# Patient Record
Sex: FEMALE | Race: WHITE | NOT HISPANIC OR LATINO | Employment: FULL TIME | URBAN - METROPOLITAN AREA
[De-identification: names, ages, dates, MRNs, and addresses within clinical notes are randomized per-mention and may not be internally consistent; named-entity substitution may affect disease eponyms.]

---

## 2017-05-18 ENCOUNTER — OFFICE VISIT (OUTPATIENT)
Dept: OBSTETRICS AND GYNECOLOGY | Facility: CLINIC | Age: 32
End: 2017-05-18
Payer: COMMERCIAL

## 2017-05-18 VITALS
HEIGHT: 66 IN | DIASTOLIC BLOOD PRESSURE: 64 MMHG | SYSTOLIC BLOOD PRESSURE: 116 MMHG | BODY MASS INDEX: 21.25 KG/M2 | WEIGHT: 132.25 LBS

## 2017-05-18 DIAGNOSIS — Z01.419 ENCOUNTER FOR GYNECOLOGICAL EXAMINATION WITHOUT ABNORMAL FINDING: Primary | ICD-10-CM

## 2017-05-18 DIAGNOSIS — Z97.5 IUD (INTRAUTERINE DEVICE) IN PLACE: ICD-10-CM

## 2017-05-18 PROCEDURE — 87624 HPV HI-RISK TYP POOLED RSLT: CPT

## 2017-05-18 PROCEDURE — 88175 CYTOPATH C/V AUTO FLUID REDO: CPT

## 2017-05-18 PROCEDURE — 99395 PREV VISIT EST AGE 18-39: CPT | Mod: S$GLB,,, | Performed by: ADVANCED PRACTICE MIDWIFE

## 2017-05-18 PROCEDURE — 99999 PR PBB SHADOW E&M-EST. PATIENT-LVL II: CPT | Mod: PBBFAC,,, | Performed by: ADVANCED PRACTICE MIDWIFE

## 2017-05-18 NOTE — MR AVS SNAPSHOT
"    [facility] [address]  [address]  [address]  [facility] [address]  Phone: [phone]  Fax: [phone]                  Ray Ugarte   2017 3:00 PM   Office Visit    Description:  Female : 1985   Provider:  Lisa Pittman CNM   Department:  [facility] [address]           Reason for Visit     Well Woman                To Do List           Goals (5 Years of Data)     None      [facility]     [facility] On Call Nurse Care Line -  Assistance  Unless otherwise directed by your provider, please contact [facility], our nurse care line that is available for  assistance.     Registered nurses in the [facility] provide: appointment scheduling, clinical advisement, health education, and other advisory services.  Call: [phone] (toll free)               Medications           Message regarding Medications     Verify the changes and/or additions to your medication regime listed below are the same as discussed with your clinician today.  If any of these changes or additions are incorrect, please notify your healthcare provider.        STOP taking these medications     amoxicillin-clavulanate 875-125mg (AUGMENTIN) 875-125 mg per tablet Take 1 tablet by mouth every 12 (twelve) hours.    guaifenesin-codeine 100-10 mg/5 ml (TUSSI-ORGANIDIN NR)  mg/5 mL syrup Take 5-10 mLs by mouth every evening.    naproxen (NAPROSYN) 500 MG tablet Take 1 tablet (500 mg total) by mouth 2 (two) times daily.           Verify that the below list of medications is an accurate representation of the medications you are currently taking.  If none reported, the list may be blank. If incorrect, please contact your healthcare provider. Carry this list with you in case of emergency.           Current Medications            Clinical Reference Information           Your Vitals Were     BP Height Weight BMI       116/64 5' 6" (1.676 m) 60 kg (132 lb 4.4 oz) 21.35 kg/m2     "   Blood Pressure          Most Recent Value    BP  116/64      Allergies as of 5/18/2017     Shellfish Containing Products      Immunizations Administered on Date of Encounter - 5/18/2017     None      Language Assistance Services     ATTENTION: Language assistance services are available, free of charge. Please call [phone].      ATENCIÓN: Si habla rob, tiene a fuentes disposición servicios gratuitos de asistencia lingüística. Llame al [phone].     CHÚ Ý: N?u b?n nói Ti?ng Vi?t, có các d?ch v? h? tr? ngôn ng? mi?n phí dành cho b?n. G?i s? [phone].         [facility] [address] complies with applicable Federal civil rights laws and does not discriminate on the basis of race, color, national origin, age, disability, or sex.

## 2017-05-18 NOTE — PROGRESS NOTES
"CC: Well woman exam    Ray Ugarte is a 31 y.o. female  presents for well woman exam.  No LMP recorded. Patient has had an implant.  No issues, problems, or complaints.      Pt's IUD threads not visualized 2015 - pelvic ultrasound here confirmed IUD in correct location.  She has not had any other problems with it since that time and happy with her contraceptive method.    Last pap: Unsure? - Normal    Past Medical History:   Diagnosis Date    Abnormal Pap smear of cervix ?    No procedures necessary. Repeat pap normal     Past Surgical History:   Procedure Laterality Date     SECTION  2015    LASIK       Social History     Social History    Marital status:      Spouse name: N/A    Number of children: N/A    Years of education: N/A     Occupational History    Not on file.     Social History Main Topics    Smoking status: Never Smoker    Smokeless tobacco: Never Used    Alcohol use No    Drug use: No    Sexual activity: Yes     Partners: Male     Birth control/ protection: IUD     Other Topics Concern    Not on file     Social History Narrative    Works as     Lives with  and daughter     Family History   Problem Relation Age of Onset    Breast cancer Neg Hx     Colon cancer Neg Hx     Ovarian cancer Neg Hx      OB History      Para Term  AB TAB SAB Ectopic Multiple Living    1 1 1      0 1          /64  Ht 5' 6" (1.676 m)  Wt 60 kg (132 lb 4.4 oz)  BMI 21.35 kg/m2      ROS:  GENERAL: Denies weight gain or weight loss. Feeling well overall.   SKIN: Denies rash or lesions.   HEAD: Denies head injury or headache.   NODES: Denies enlarged lymph nodes.   CHEST: Denies chest pain or shortness of breath.   CARDIOVASCULAR: Denies palpitations or left sided chest pain.   ABDOMEN: No abdominal pain, constipation, diarrhea, nausea, vomiting or rectal bleeding.   URINARY: No frequency, dysuria, hematuria, or burning on " urination.  REPRODUCTIVE: See HPI.   BREASTS: The patient performs breast self-examination and denies pain, lumps, or nipple discharge.   HEMATOLOGIC: No easy bruisability or excessive bleeding.   MUSCULOSKELETAL: Denies joint pain or swelling.   NEUROLOGIC: Denies syncope or weakness.   PSYCHIATRIC: Denies depression, anxiety or mood swings.    PHYSICAL EXAM:  APPEARANCE: Well nourished, well developed, in no acute distress.  AFFECT: Alert and oriented x 3  SKIN: Warm, dry, & intact. No acne or hirsutism.  NECK: Neck symmetric with normal range of motion  NODES: No cervical, axillary, epitrochlear, inguinal, or femoral lymph node enlargement  CHEST: Good respiratory effect.  ABDOMEN: Soft.  No tenderness or masses.  No hepatosplenomegaly.  No hernias.  BREASTS: Symmetrical, no skin changes or visible lesions.  No palpable masses, nipple discharge bilaterally.  PELVIC: Normal external genitalia without lesions.  Normal hair distribution.  Adequate perineal body, normal urethral meatus.  Vagina moist and well rugated without lesions or discharge.  Cervix pink, without lesions, discharge or tenderness - IUD threads not visible.  No significant cystocele or rectocele.  Bimanual exam shows uterus to be normal size, regular, mobile and nontender.  Adnexa without masses or tenderness.    EXTREMITIES: No edema.    ASSESSMENT/PLAN:  Encounter for gynecological examination without abnormal finding  -     Liquid-based pap smear, screening  -     HPV High Risk Genotypes, PCR    Mirena IUD in place (since 6/2015)    Patient was counseled today on A.C.S. Pap guidelines and recommendations for yearly pelvic exams, mammograms and monthly self breast exams; to see her PCP for other health maintenance.     Return in about 1 year (around 5/18/2018).

## 2017-05-24 LAB
HPV16 DNA SPEC QL NAA+PROBE: NEGATIVE
HPV16+18+H RISK 12 DNA CVX-IMP: NEGATIVE
HPV18 DNA SPEC QL NAA+PROBE: NEGATIVE

## 2017-06-26 ENCOUNTER — OFFICE VISIT (OUTPATIENT)
Dept: INTERNAL MEDICINE | Facility: CLINIC | Age: 32
End: 2017-06-26
Payer: COMMERCIAL

## 2017-06-26 VITALS
BODY MASS INDEX: 22.22 KG/M2 | HEIGHT: 66 IN | SYSTOLIC BLOOD PRESSURE: 122 MMHG | DIASTOLIC BLOOD PRESSURE: 74 MMHG | OXYGEN SATURATION: 99 % | HEART RATE: 95 BPM | WEIGHT: 138.25 LBS | TEMPERATURE: 99 F

## 2017-06-26 DIAGNOSIS — J32.9 SINUSITIS, UNSPECIFIED CHRONICITY, UNSPECIFIED LOCATION: Primary | ICD-10-CM

## 2017-06-26 PROCEDURE — 99213 OFFICE O/P EST LOW 20 MIN: CPT | Mod: S$GLB,,, | Performed by: INTERNAL MEDICINE

## 2017-06-26 PROCEDURE — 99999 PR PBB SHADOW E&M-EST. PATIENT-LVL III: CPT | Mod: PBBFAC,,, | Performed by: INTERNAL MEDICINE

## 2017-06-26 RX ORDER — AMOXICILLIN 875 MG/1
875 TABLET, FILM COATED ORAL EVERY 12 HOURS
Qty: 20 TABLET | Refills: 0 | Status: SHIPPED | OUTPATIENT
Start: 2017-06-26 | End: 2017-11-21

## 2017-06-26 NOTE — PROGRESS NOTES
Subjective:       Patient ID: Ray Ugarte is a 31 y.o. female.    Chief Complaint: URI (productive cough with yellow phlegm, nasal and chest congestion, sore throat x 3 wks )    URI    This is a new problem. The current episode started 1 to 4 weeks ago. The problem has been gradually worsening. There has been no fever. Associated symptoms include congestion, coughing, ear pain, headaches, rhinorrhea, sinus pain and a sore throat. Pertinent negatives include no abdominal pain, chest pain, diarrhea, dysuria, nausea, rash, vomiting or wheezing. She has tried antihistamine, increased fluids and NSAIDs for the symptoms. The treatment provided no relief.     Review of Systems   Constitutional: Negative for activity change, chills, fatigue and fever.   HENT: Positive for congestion, ear pain, rhinorrhea and sore throat. Negative for nosebleeds, postnasal drip and sinus pressure.    Eyes: Negative.  Negative for visual disturbance.   Respiratory: Positive for cough. Negative for chest tightness, shortness of breath and wheezing.    Cardiovascular: Negative for chest pain.   Gastrointestinal: Negative for abdominal pain, diarrhea, nausea and vomiting.   Genitourinary: Negative for difficulty urinating, dysuria, frequency and urgency.   Musculoskeletal: Negative for arthralgias and neck stiffness.   Skin: Negative for rash.   Neurological: Positive for headaches. Negative for dizziness and weakness.   Psychiatric/Behavioral: Negative for sleep disturbance. The patient is not nervous/anxious.        Objective:      Physical Exam   Constitutional: She is oriented to person, place, and time. She appears well-developed and well-nourished.  Non-toxic appearance. No distress.   HENT:   Head: Normocephalic and atraumatic.   Right Ear: Tympanic membrane, external ear and ear canal normal.   Left Ear: Tympanic membrane, external ear and ear canal normal.   Nose: Right sinus exhibits maxillary sinus tenderness and frontal sinus  tenderness. Left sinus exhibits maxillary sinus tenderness and frontal sinus tenderness.   Mouth/Throat:       Eyes: EOM are normal. Pupils are equal, round, and reactive to light. No scleral icterus.   Neck: Normal range of motion. Neck supple. No thyromegaly present.   Cardiovascular: Normal rate, regular rhythm and normal heart sounds.    Pulmonary/Chest: Effort normal and breath sounds normal.   Abdominal: Soft. Bowel sounds are normal. She exhibits no mass. There is no tenderness. There is no rebound.   Musculoskeletal: Normal range of motion.   Lymphadenopathy:     She has no cervical adenopathy.   Neurological: She is alert and oriented to person, place, and time. She has normal reflexes. She displays normal reflexes. No cranial nerve deficit. She exhibits normal muscle tone. Coordination normal.   Skin: Skin is warm and dry.   Psychiatric: She has a normal mood and affect. Her behavior is normal.       Assessment:       1. Sinusitis, unspecified chronicity, unspecified location        Plan:   Ray was seen today for uri.    Diagnoses and all orders for this visit:    Sinusitis, unspecified chronicity, unspecified location    Other orders  -     amoxicillin (AMOXIL) 875 MG tablet; Take 1 tablet (875 mg total) by mouth every 12 (twelve) hours.

## 2017-07-11 ENCOUNTER — OFFICE VISIT (OUTPATIENT)
Dept: INTERNAL MEDICINE | Facility: CLINIC | Age: 32
End: 2017-07-11
Payer: COMMERCIAL

## 2017-07-11 VITALS
WEIGHT: 138 LBS | BODY MASS INDEX: 22.18 KG/M2 | HEART RATE: 75 BPM | OXYGEN SATURATION: 98 % | DIASTOLIC BLOOD PRESSURE: 70 MMHG | HEIGHT: 66 IN | SYSTOLIC BLOOD PRESSURE: 104 MMHG

## 2017-07-11 DIAGNOSIS — J32.9 VIRAL SINUSITIS: Primary | ICD-10-CM

## 2017-07-11 DIAGNOSIS — H57.89 IRRITATION OF LEFT EYE: ICD-10-CM

## 2017-07-11 DIAGNOSIS — B97.89 VIRAL SINUSITIS: Primary | ICD-10-CM

## 2017-07-11 PROCEDURE — 99999 PR PBB SHADOW E&M-EST. PATIENT-LVL III: CPT | Mod: PBBFAC,,, | Performed by: INTERNAL MEDICINE

## 2017-07-11 PROCEDURE — 99213 OFFICE O/P EST LOW 20 MIN: CPT | Mod: 25,S$GLB,, | Performed by: INTERNAL MEDICINE

## 2017-07-11 PROCEDURE — 96372 THER/PROPH/DIAG INJ SC/IM: CPT | Mod: S$GLB,,, | Performed by: INTERNAL MEDICINE

## 2017-07-11 RX ORDER — TRIAMCINOLONE ACETONIDE 40 MG/ML
40 INJECTION, SUSPENSION INTRA-ARTICULAR; INTRAMUSCULAR
Status: COMPLETED | OUTPATIENT
Start: 2017-07-11 | End: 2017-07-11

## 2017-07-11 RX ADMIN — TRIAMCINOLONE ACETONIDE 40 MG: 40 INJECTION, SUSPENSION INTRA-ARTICULAR; INTRAMUSCULAR at 11:07

## 2017-07-11 NOTE — PROGRESS NOTES
Subjective:       Patient ID: Ray Ugarte is a 31 y.o. female.    Chief Complaint: Sore Throat (dry cough and sore throat X2days, eye infection)    HPI     Patient is a 31 year old female here today for sore throat and eye irritation. Sx began 3 days ago. No fever/chills. No ear pain. Has mild congestion. Cough which is productive green  Sputum is intermittent. PND. No upset stomach or diarrhea. She was treated about 2 weeks ago for sinus infection with amoxil, says sx resolved mostly except the cough but yesterday she felt worse. Today she says she overall does feel better.    Review of Systems   HENT: Positive for congestion, postnasal drip and sore throat. Negative for ear pain and rhinorrhea.    Eyes: Positive for discharge and visual disturbance.   Respiratory: Positive for cough. Negative for shortness of breath and wheezing.    Gastrointestinal: Negative for abdominal pain and diarrhea.       Objective:      Physical Exam   Constitutional: She is oriented to person, place, and time. She appears well-developed and well-nourished. No distress.   HENT:   Head: Normocephalic and atraumatic.   Right Ear: External ear normal.   Left Ear: External ear normal.   No effusion posterior to BL TM  Posterior oropharyngeal erythema + cobblestoning  No tonsillar exudate   Eyes: Conjunctivae and EOM are normal. Pupils are equal, round, and reactive to light.   Neck: Normal range of motion. Neck supple. No thyromegaly present.   Cardiovascular: Normal rate, regular rhythm, normal heart sounds and intact distal pulses.    No murmur heard.  Pulmonary/Chest: Effort normal and breath sounds normal. No respiratory distress. She has no wheezes. She has no rales.   Musculoskeletal: She exhibits no edema.   Lymphadenopathy:     She has no cervical adenopathy.   Neurological: She is alert and oriented to person, place, and time.   Skin: Skin is warm and dry. She is not diaphoretic.   Nursing note and vitals reviewed.       Assessment:       1. Viral sinusitis    2. Irritation of left eye        Plan:       Suspect a viral sinusitis  Sx < 7 days  Recommend symptom control with OTC antihistamine daily, OTC flonase daily for atleast the next 1-2 weeks, ibuprofen 400 mg every 8 hours prn sore throat  Kenalog 40 mg IM x 1 for pharyngeal inflammation  Optometry referral for L eye irritation   If sx last > 1 week, please let your PCP know

## 2017-07-11 NOTE — PROGRESS NOTES
2 patient identifiers used (name and ). Allergies reviewed.  Procedure tolerated well.  Instructed to remain for 15-20 minutes and report any adverse reaction

## 2017-07-17 ENCOUNTER — INITIAL CONSULT (OUTPATIENT)
Dept: OPTOMETRY | Facility: CLINIC | Age: 32
End: 2017-07-17
Payer: COMMERCIAL

## 2017-07-17 DIAGNOSIS — H52.13 BILATERAL MYOPIA: ICD-10-CM

## 2017-07-17 DIAGNOSIS — H10.13 ALLERGIC CONJUNCTIVITIS, BILATERAL: Primary | ICD-10-CM

## 2017-07-17 DIAGNOSIS — H04.123 BILATERAL DRY EYES: ICD-10-CM

## 2017-07-17 PROCEDURE — 99999 PR PBB SHADOW E&M-EST. PATIENT-LVL II: CPT | Mod: PBBFAC,,, | Performed by: OPTOMETRIST

## 2017-07-17 PROCEDURE — 92004 COMPRE OPH EXAM NEW PT 1/>: CPT | Mod: S$GLB,,, | Performed by: OPTOMETRIST

## 2017-07-17 PROCEDURE — 92015 DETERMINE REFRACTIVE STATE: CPT | Mod: S$GLB,,, | Performed by: OPTOMETRIST

## 2017-07-17 NOTE — PROGRESS NOTES
HPI     Eye Problem    Additional comments: Possible eye infection OS           Comments   Ms. Ray Ugarte was referred by Jarad Kaur MD for eye   irritation (possible eye infection OS).  Pt also would like refraction today, but declines DFE.     Patient complains of itchy, red, painful eye OS. Eye pain last occurred 1   week ago (eye infection led to sinus infection), but pain has resolved.   She reports watery discharge during the day and crusting upon waking.   Pt says she had an eye infection OS 12-13 years ago, and since then she   has had chronic problems OS. OD asymptomatic. Multiple eye infections in   the past few months so she was referred to Optometry. Pt was successfully   treated with Tobradex drops in the past (last prescribed 07/16/16).    (+)drops: Refresh prn  (-)flashes  (-)floaters  (+)diplopia  (-)eye pain currently, but sometimes feels pain in the past.  (+)discharge: watery during the day, crusting upon waking.   (-)light sensitivity.     Diabetic no  No results found for: HGBA1C    OCULAR HISTORY  Last Eye Exam 2 years  LASIK OU at 17 years old  (-)diagnosed or treated for any eye conditions or diseases     FAMILY HISTORY  (-)Glaucoma            Last edited by Colleen Avila, OD on 7/17/2017  5:04 PM. (History)            Assessment /Plan     For exam results, see Encounter Report.    Allergic conjunctivitis, bilateral  Bilateral dry eyes   Cause if itchiness and redness. Pt reassured, no signs of infection today OU.    Recommended OTC Zaditor/Alaway BID OU and cool compresses PRN. Avoid rubbing eyes. Continue Refresh BID-TID OU.    RTC if symptoms worsen (may need to consider topical steroids).     Bilateral myopia   Small increase in refractive error OU. New glasses prescription released, adaptation expected.    Eyeglass Final Rx     Eyeglass Final Rx       Sphere Cylinder Dist [facility]    Right -1.00 Sphere 20/20    Left -1.00 Sphere 20/20    Expiration Date:  7/18/2018                  Patient declines dilation today despite explanation of risks and benefits, including the potential of undetected ocular pathology. Offered return appointment at a later date to complete the dilated portion of the exam, but pt declined.  Reviewed signs and symptoms of a retinal detachment, pt understands to return to clinic immediately with any new floaters, flashes of light, or a veil over vision.        RTC 1 year for comprehensive eye exam with DFE, or sooner prn

## 2017-07-17 NOTE — LETTER
July 17, 2017      Jarad Kaur MD  [address]  [place] [address]           [facility]  [address]  [facility] [address]  Phone: [phone]          Patient: Ray Ugarte   MR Number: [id number]   YOB: 1985   Date of Visit: 7/17/2017       Dear Dr. Jarad Kaur:    Thank you for referring Ray Ugarte to me for evaluation. Attached you will find relevant portions of my assessment and plan of care.    If you have questions, please do not hesitate to call me. I look forward to following Ray Ugarte along with you.    Sincerely,    Colleen Avila, OD    Enclosure  CC:  No Recipients    If you would like to receive this communication electronically, please contact [email or web address] or [phone] to request more information on [employer] access.    For providers and/or their staff who would like to refer a patient to [facility], please contact us through our one-stop-shop provider referral line, [facility], at [phone].    If you feel you have received this communication in error or would no longer like to receive these types of communications, please e-mail [email or web address]

## 2017-07-17 NOTE — PATIENT INSTRUCTIONS
"You have ocular allergies that are causing your eyes to feel itchy. To help with your symptoms please use over-the-counter Zaditor or Alaway; 1 drop in each eye 2 times per day. You can also use cool compresses as needed.     ==============================================    DRY EYES     Dry eyes is a common condition. It can be caused by an insufficient volume of tears, or by tears that do not spread evenly over the cornea. An uneven tear film can also cause vision to blur intermittently.   Dry eyes are often associated with burning, stinging, and/or red eyes.As we age, the eyes usually produce fewer tears and result in decreased normal eye lubrication. Paradoxically, dry eye can make eyes water. When they water, that watery production actually makes the dry eye situation worse because the watery tears do not lubricate the eye well at all. Watery tears really serve to flush foreign material out of the eye, not to lubricate. Unfortunately, when the eyes get really dry they become irritated and stimulate the formation of watery tears.   Lubricants, in the form of drops or ointments, are the principal treatment for dry eyes. The following are recommendations for managing your dry eye condition:    1) Use over-the-counter artificial tears or lubricants (such as Systane Balance, Soothe XP, Refresh Optive, Blink, or Genteal), 1 drop in each eye 3 to 4 times a day. Please avoid drops that advertise redness relief since these can be unhealthy for your eyes and can cause permanent redness if used long-term.     It is best to take artificial tears on a schedule, like you would for a medication. Taking them routinely at breakfast, lunch, and dinner for example will provide better relief and better use of the tear drop than taking them whenever your eyes "feel" dry.    2) Optional use of over-the-counter lubricating gels (such as Genteal Gel, Systane Gel, or Refresh PM) applied to the inside of the lower lid of both eyes at " bedtime. This gel is very thick and may cause blurred vision, so we recommend you use it before bedtime. In the morning you can gently wipe your eyes with a damp washcloth to remove any residual gel.    3) Warm compresses applied to the closed eyelids twice per day, followed with lid massage.    4) Always drink an adequate amount of water daily (4-6 cups a day).    5) 1000 mg of good quality fish oil (labeled DHA and/or EPA). Flaxseed oil can also be beneficial. Do not take fish oil if you are currently taking a medication called warfarin or coumadin.    6) Use a humidifier in your bedroom.    7) If the dryness continues there may be additional options of punctal plugs, or prescription dry eye drops such as Restasis or Xiidra. Punctal plugs are medical devices inserted into the puncta or the drain of the eye to block some of your natural tears from draining off the eye. Restasis and Xiidra are prescription eye drops that, over time, may help your body make more tears naturally.    It is important to maintain this treatment plan until your symptoms have improved. Once improved, you can reduce the frequency of lubricants and warm compresses. If the symptoms recur, then apply as needed for comfort.    ==============================================    FLASHES AND FLOATERS    You've noticed something new in your field of vision, possibly one or more movable spots or squiggles that you can't remember seeing before. Here are some points to follow and some information about this condition:    Located in the hollow interior of the eye is a transparent gel called the VITREOUS. It is composed of microscopic fibers, large molecules, and fluid-like water.  Most people also have small pieces of material suspended within the vitreous, which under ideal lighting conditions will cast a shadow on the retina and become visible as moving spots, which are called vitreous floaters. Floaters become more common with age.    During life the  vitreous fibers condense together and the gel becomes more fluid. Fibrous clumps can form which may become large enough to be seen as floaters. As the fibers condense the liquid portion escapes, allowing the vitreous to shrink, separate from the retina, and collapse forward in the eye. While shrinking, the vitreous gel can pull against the retina causing episodes of light flashes or arcs of light.  The retina does not have pain sensors, and its only response is flashes of light. People often describe these flashes as lightning streaks or fireworks.     A concern is that traction on the retina from the shrinking vitreous may pull a tear in the retina, which could evolve into a retinal detachment.  Therefore persistent flashes do require urgent evaluation. Other symptoms that require urgent evaluation are a large shadow, curtain, or blank spot in your vision, or a sudden shower of dozens or hundreds of tiny floaters resembling pepper or soot.    To summarize, nearly everyone has floaters, but a sudden shower of dots, persistent light flashes in one eye, or a curtain or shadow in your vision require urgent consultation.  If these occur, please let us know immediately.       Colleen Avila, OD

## 2017-07-24 ENCOUNTER — TELEPHONE (OUTPATIENT)
Dept: OBSTETRICS AND GYNECOLOGY | Facility: CLINIC | Age: 32
End: 2017-07-24

## 2017-07-24 NOTE — TELEPHONE ENCOUNTER
----- Message from Cesar Singh sent at 7/24/2017  4:07 PM CDT -----  Contact: Pt  X_ 1st Request  _ 2nd Request  _ 3rd Request    Who: KATE VALLADARES [[id number]]    Why: Patient would like to speak with staff in regards to having annual blood work orders put in    What Number to Call Back: [phone]     When to Expect a call back: (Before the end of the day)  -- if call after 3:00 call back will be tomorrow.

## 2017-07-24 NOTE — TELEPHONE ENCOUNTER
Contacted pt and informed her that Dr. Khan is out of the office until tomorrow and that she has to place the orders. Informed pt that Dr. Khan was sent a message to place the orders. Pt questioned whether the alert will come to her [facility] account. Informed the pt that Dr. Khan will send a message to her staff to call and schedule the labs. Pt verbalized understanding.

## 2017-07-24 NOTE — TELEPHONE ENCOUNTER
Attempted to contact patient to confirm her want for thyroid and lipid testing. No answer. Left message for pt to call clinic back.

## 2017-07-24 NOTE — TELEPHONE ENCOUNTER
----- Message from Harley Mireles sent at 7/24/2017 10:18 AM CDT -----  X_  1st Request  _  2nd Request  _  3rd Request        Who: KATE VALLADARES [[id number]]    Why: Pt stating she needs orders for thyroid and lipid tests. Please call to schedule.    What Number to Call Back:[phone]    When to Expect a call back: (With in 24 hours)

## 2017-10-05 ENCOUNTER — OFFICE VISIT (OUTPATIENT)
Dept: URGENT CARE | Facility: CLINIC | Age: 32
End: 2017-10-05
Payer: COMMERCIAL

## 2017-10-05 VITALS
SYSTOLIC BLOOD PRESSURE: 107 MMHG | HEART RATE: 79 BPM | WEIGHT: 136 LBS | OXYGEN SATURATION: 99 % | TEMPERATURE: 98 F | HEIGHT: 66 IN | RESPIRATION RATE: 12 BRPM | BODY MASS INDEX: 21.86 KG/M2 | DIASTOLIC BLOOD PRESSURE: 73 MMHG

## 2017-10-05 DIAGNOSIS — J06.9 UPPER RESPIRATORY TRACT INFECTION, UNSPECIFIED TYPE: ICD-10-CM

## 2017-10-05 DIAGNOSIS — M26.69 TMJ INFLAMMATION: Primary | ICD-10-CM

## 2017-10-05 PROCEDURE — 99213 OFFICE O/P EST LOW 20 MIN: CPT | Mod: S$GLB,,, | Performed by: NURSE PRACTITIONER

## 2017-10-05 RX ORDER — METHYLPREDNISOLONE 4 MG/1
TABLET ORAL
Qty: 21 TABLET | Refills: 0 | Status: SHIPPED | OUTPATIENT
Start: 2017-10-05 | End: 2017-11-30

## 2017-10-05 NOTE — PROGRESS NOTES
"Subjective:       Patient ID: Ray Ugarte is a 32 y.o. female.    Vitals:  height is 5' 6" (1.676 m) and weight is 61.7 kg (136 lb). Her temperature is 97.8 °F (36.6 °C). Her blood pressure is 107/73 and her pulse is 79. Her respiration is 12 and oxygen saturation is 99%.     Chief Complaint: Sinus Problem    Pt has been experiencing some sinus problems but now she is more concerned with the Sore Throat and Jaw pain.       Sinus Problem   This is a new problem. The current episode started in the past 7 days. The problem has been gradually improving since onset. There has been no fever. Her pain is at a severity of 5/10 (Throat and Jaw pain). The pain is moderate. Associated symptoms include congestion, coughing, ear pain and a sore throat. Pertinent negatives include no chills, headaches, hoarse voice or shortness of breath. (Jaw Pain) Past treatments include oral decongestants. The treatment provided no relief.     Review of Systems   Constitution: Positive for malaise/fatigue. Negative for chills and fever.   HENT: Positive for congestion, ear pain and sore throat. Negative for hoarse voice.    Eyes: Negative for discharge and redness.   Cardiovascular: Negative for chest pain, dyspnea on exertion and leg swelling.   Respiratory: Positive for cough. Negative for shortness of breath, sputum production and wheezing.    Musculoskeletal: Negative for myalgias.   Gastrointestinal: Negative for abdominal pain and nausea.   Neurological: Negative for headaches.   All other systems reviewed and are negative.      Objective:      Physical Exam   Constitutional: She is oriented to person, place, and time. She appears well-developed and well-nourished. She is cooperative.  Non-toxic appearance. She does not appear ill. No distress.   HENT:   Head: Normocephalic and atraumatic.   Right Ear: Hearing, tympanic membrane, external ear and ear canal normal.   Left Ear: Hearing, tympanic membrane, external ear and ear canal " normal.   Nose: Nose normal. No mucosal edema, rhinorrhea or nasal deformity. No epistaxis. Right sinus exhibits no maxillary sinus tenderness and no frontal sinus tenderness. Left sinus exhibits no maxillary sinus tenderness and no frontal sinus tenderness.   Mouth/Throat: Uvula is midline, oropharynx is clear and moist and mucous membranes are normal. No trismus in the jaw. Normal dentition. No uvula swelling. No posterior oropharyngeal erythema (COBBLESTONING).   Eyes: Conjunctivae and lids are normal. No scleral icterus.   Sclera clear bilat   Neck: Trachea normal, full passive range of motion without pain and phonation normal. Neck supple.   Cardiovascular: Normal rate, regular rhythm, normal heart sounds, intact distal pulses and normal pulses.    Pulmonary/Chest: Effort normal and breath sounds normal. No respiratory distress.   Abdominal: Soft. Normal appearance and bowel sounds are normal. She exhibits no distension. There is no tenderness.   Musculoskeletal: Normal range of motion. She exhibits tenderness (C/O PAIN AND TTP ABOUT TMJ. PAIN WHEN OPENING MOUTH.  PATIENT STATES THAT SHE DOES GRIND HER TEETH). She exhibits no edema or deformity.   Lymphadenopathy:        Head (right side): Tonsillar adenopathy present.        Head (left side): Tonsillar adenopathy present.   Neurological: She is alert and oriented to person, place, and time. She exhibits normal muscle tone. Coordination normal.   Skin: Skin is warm, dry and intact. She is not diaphoretic. No pallor.   Psychiatric: She has a normal mood and affect. Her speech is normal and behavior is normal. Judgment and thought content normal. Cognition and memory are normal.   Nursing note and vitals reviewed.      Assessment:       1. TMJ inflammation    2. Upper respiratory tract infection, unspecified type        Plan:         TMJ inflammation  -     methylPREDNISolone (MEDROL DOSEPACK) 4 mg tablet; TAKE AS DIRECTED ON PACKAGE  Dispense: 21 tablet; Refill:  0    Upper respiratory tract infection, unspecified type    Please drink plenty of fluids.  Please get plenty of rest.  Please return here or go to the Emergency Department for any concerns or worsening of condition.  If you were prescribed antibiotics, please take them to completion.  If you were given wait & see antibiotics, please wait 5-7 days before taking them, and only take them if your symptoms have worsened or not improved.  If you do begin taking the antibiotics, please take them to completion.  If you were prescribed a narcotic medication, do not drive or operate heavy equipment or machinery while taking these medications.  If you were given a steroid shot in the clinic and have also been given a prescription for a steroid such as Prednisone or a Medrol Dose Pack, please begin taking them tomorrow.  If you do not have Hypertension or any history of palpitations, it is ok to take over the counter Sudafed or Mucinex D or Allegra-D or Claritin-D or Zyrtec-D.  If you do take one of the above, it is ok to combine that with plain over the counter Mucinex or Allegra or Claritin or Zyrtec.  If for example you are taking Zyrtec -D, you can combine that with Mucinex, but not Mucinex-D.  If you are taking Mucinex-D, you can combine that with plain Allegra or Claritin or Zyrtec.   If you do have Hypertension or palpitations, it is safe to take Coricidin HBP for relief of sinus symptoms.  If not allergic, please take over the counter Tylenol (Acetaminophen) and/or Motrin (Ibuprofen) as directed for control of pain and/or fever.  Please follow up with your primary care doctor or specialist as needed.    If you  smoke, please stop smoking.

## 2017-10-05 NOTE — PATIENT INSTRUCTIONS
Please drink plenty of fluids.  Please get plenty of rest.  Please return here or go to the Emergency Department for any concerns or worsening of condition.  If you were prescribed antibiotics, please take them to completion.  If you were given wait & see antibiotics, please wait 5-7 days before taking them, and only take them if your symptoms have worsened or not improved.  If you do begin taking the antibiotics, please take them to completion.  If you were prescribed a narcotic medication, do not drive or operate heavy equipment or machinery while taking these medications.  If you were given a steroid shot in the clinic and have also been given a prescription for a steroid such as Prednisone or a Medrol Dose Pack, please begin taking them tomorrow.  If you do not have Hypertension or any history of palpitations, it is ok to take over the counter Sudafed or Mucinex D or Allegra-D or Claritin-D or Zyrtec-D.  If you do take one of the above, it is ok to combine that with plain over the counter Mucinex or Allegra or Claritin or Zyrtec.  If for example you are taking Zyrtec -D, you can combine that with Mucinex, but not Mucinex-D.  If you are taking Mucinex-D, you can combine that with plain Allegra or Claritin or Zyrtec.   If you do have Hypertension or palpitations, it is safe to take Coricidin HBP for relief of sinus symptoms.  If not allergic, please take over the counter Tylenol (Acetaminophen) and/or Motrin (Ibuprofen) as directed for control of pain and/or fever.  Please follow up with your primary care doctor or specialist as needed.    If you  smoke, please stop smoking.  TMJ Syndrome  The temporomandibular joint (TMJ) is the joint that connects your lower jaw to your head. You can feel it in front of your ears when you open and close your mouth. TMJ disorders involve chronic or recurrent pain in the joint. When treated, symptoms of TMJ disorders usually go away within a few months.  Causes  There is no widely  agreed-on cause of TMJ disorders. They have been linked to injury, arthritis, chronic fatigue syndrome, and fibromyalgia. A definite connection has not been shown, though.  Symptoms  · Pain in the face, jaw, or neck  · Pain with jaw movement or chewing  · Locking or catching sensation of the jaw  · Clicking, popping, or grinding sounds with movement of the TMJ  · Headache  · Ear pain  Home care  Modest, nonsurgical treatments are a good first step toward relieving symptoms. Try the approaches described below.  · Rest the jaw by avoiding crunchy or hard-to-chew foods. Do not eat hard or sticky candies. Soft foods and liquids are easier on the jaw.  · Protect your jaw while yawning. If you need to yawn, put your fist under your chin to prevent your mouth from opening up too wide.  · To help relieve pain, try applying hot or cold packs to the painful area. Try both hot and cold to find out which works best for you. If you use hot packs (small towels soaked in hot water), be careful not to burn yourself.  · You may take acetaminophen or ibuprofen for pain, unless you were given a different pain medicine. (Note: If you have chronic liver or kidney disease or have ever had a stomach ulcer or gastrointestinal bleeding, talk with your healthcare provider before using these medicines. Also talk to your provider if you are taking medicine to prevent blood clots.) Aspirin should never be given to anyone younger than 18 years of age who is ill with a viral infection or fever. It may cause severe liver or brain damage.  Reducing stress  If stress seems to be contributing to your symptoms, try to identify the sources of stress in your life. These arent always obvious. Common stressors include:  · Everyday hassles (which can add up), such as traffic jams, missed appointments, or car trouble  · Major life changes, both good (such as a new baby or job promotion) and bad (loss of job or loss of a loved one)  · Overload: The feeling  that you have too many responsibilities and can't take care of everything at once  · Helplessness: Feeling like your problems are more than you can solve  When possible, do something about your sources of stress. See if you can avoid hassles, limit the amount of change in your life at one time, and take breaks when you feel overloaded.  Unfortunately, many stressful situations cannot be avoided. So learning how to manage stress better is very important. Getting regular exercise, eating nutritious, balanced meals, and getting adequate rest all help to make everyday stress more manageable. Certain techniques are also helpful: relaxation and breathing exercises, visualization, biofeedback, meditation, or simply taking some time out to clear your mind. For more information, talk with your healthcare provider.  Follow-up care  Follow up with your healthcare provider, or as advised. Further testing and additional treatment may be required. If changes to your lifestyle do not improve your symptoms, talk with your healthcare provider about other available therapies. These include bite guards for help with teeth grinding, stress management techniques, and more. If stress is an important factor and does not respond to the above simple measures, talk to your doctor about a referral for stress management.  · If X-rays were done, they will be reviewed by a specialist. You will be notified of the results, especially if they affect treatment.  Call 911  Call emergency services right away if any of these occur:  · Trouble breathing or swallowing, wheezing  · Confusion  · Extreme drowsiness or trouble awakening  · Fainting or loss of consciousness  · Rapid heart rate  When to seek medical advice  Call your healthcare provider right away if any of these occur:  · Your face becomes swollen or red.  · Your pain worsens.  · You have increasing neck, mouth, tooth, or throat pain.  · You develop a fever of 100.4F (38°C) or higher  Date  Last Reviewed: 7/30/2015  © 6397-2426 [employer]. [address] [place] [address], [place] [address]. All rights reserved. This information is not intended as a substitute for professional medical care. Always follow your healthcare professional's instructions.

## 2017-11-21 ENCOUNTER — HOSPITAL ENCOUNTER (OUTPATIENT)
Dept: RADIOLOGY | Facility: HOSPITAL | Age: 32
Discharge: HOME | End: 2017-11-21
Attending: INTERNAL MEDICINE
Payer: COMMERCIAL

## 2017-11-21 ENCOUNTER — OFFICE VISIT (OUTPATIENT)
Dept: INTERNAL MEDICINE | Facility: CLINIC | Age: 32
End: 2017-11-21
Payer: COMMERCIAL

## 2017-11-21 VITALS
WEIGHT: 141.13 LBS | BODY MASS INDEX: 22.68 KG/M2 | HEART RATE: 64 BPM | HEIGHT: 66 IN | SYSTOLIC BLOOD PRESSURE: 126 MMHG | DIASTOLIC BLOOD PRESSURE: 82 MMHG

## 2017-11-21 DIAGNOSIS — M25.551 HIP PAIN, BILATERAL: ICD-10-CM

## 2017-11-21 DIAGNOSIS — M25.552 HIP PAIN, BILATERAL: ICD-10-CM

## 2017-11-21 DIAGNOSIS — R21 SKIN RASH: ICD-10-CM

## 2017-11-21 DIAGNOSIS — Z00.00 ANNUAL PHYSICAL EXAM: Primary | ICD-10-CM

## 2017-11-21 PROCEDURE — 73521 X-RAY EXAM HIPS BI 2 VIEWS: CPT | Mod: TC

## 2017-11-21 PROCEDURE — 99395 PREV VISIT EST AGE 18-39: CPT | Mod: S$GLB,,, | Performed by: INTERNAL MEDICINE

## 2017-11-21 PROCEDURE — 99999 PR PBB SHADOW E&M-EST. PATIENT-LVL III: CPT | Mod: PBBFAC,,, | Performed by: INTERNAL MEDICINE

## 2017-11-21 PROCEDURE — 73521 X-RAY EXAM HIPS BI 2 VIEWS: CPT | Mod: 26,,, | Performed by: RADIOLOGY

## 2017-11-21 NOTE — PROGRESS NOTES
"Subjective:       Patient ID: Ray Ugarte is a 32 y.o. female.    Chief Complaint: Establish Care    HPI    First visit with me, seen in last year by Internal Medicine, Optometry, Gynecology. Upcoming appointment with Gynecology.    Noted some hip pain, practicing for dancing. Hip hurting since Sunday. Hip pain since baby 2.5 yrs ago. Mother had THR bilaterally in last few years. During pregnancy was first time of any hip issues. Pain is "in the joints". During pregnancy had some lateral hip pain and tenderness. Pain will come up in both sides. Ibuprofen works when flares up. Lasts longer if doing more physical activity. Hips "pop" when moving around or getting up.     Itchy bumps under armpits, going on for a few weeks. No specific triggers. Chronic dry skin patches on back of hands, since having baby and hand washing. Uses lotions with some mild help. No new exposures.     Reviewed PMH, PSH, SH, FH, allergies, and medications.     Review of Systems   Constitutional: Negative for activity change and unexpected weight change.   HENT: Negative for hearing loss, rhinorrhea and trouble swallowing.    Eyes: Positive for discharge. Negative for visual disturbance.   Respiratory: Negative for chest tightness and wheezing.    Cardiovascular: Negative for chest pain and palpitations.   Gastrointestinal: Negative for blood in stool, constipation, diarrhea and vomiting.   Endocrine: Negative for polydipsia and polyuria.   Genitourinary: Positive for menstrual problem. Negative for difficulty urinating, dysuria and hematuria.   Musculoskeletal: Positive for arthralgias. Negative for joint swelling and neck pain.   Neurological: Negative for weakness and headaches.   Psychiatric/Behavioral: Negative for confusion and dysphoric mood.       Objective:      Physical Exam   Constitutional: She is oriented to person, place, and time. No distress.    woman whose Body mass index is 22.77 kg/m².    HENT:   Head: " "Atraumatic.   Right Ear: Tympanic membrane normal.   Left Ear: Tympanic membrane normal.   Mouth/Throat: Oropharynx is clear and moist. No oropharyngeal exudate.   Eyes: Pupils are equal, round, and reactive to light. Right eye exhibits no discharge. Left eye exhibits no discharge.   Neck: Normal range of motion. No thyromegaly present.   Cardiovascular: Normal rate, regular rhythm and normal heart sounds.    Pulmonary/Chest: Effort normal and breath sounds normal. No stridor. She has no wheezes. She has no rales.   Abdominal: Soft. She exhibits no distension and no mass. There is no tenderness. There is no guarding.   Musculoskeletal: She exhibits no edema or tenderness.   BREANNA negative bilaterally. Straight leg raise causes pain in hamstrings bilaterally but no sciatic symptoms.   Lymphadenopathy:     She has no cervical adenopathy.   Neurological: She is alert and oriented to person, place, and time.   Skin: Skin is warm and dry. Rash (2-3 small red macules <1 mm in R & L axilla w/o tenderness or excoriation) noted.   Psychiatric: She has a normal mood and affect. Her behavior is normal.   Nursing note and vitals reviewed.      Vitals:    11/21/17 0844   BP: 126/82   BP Location: Right arm   Patient Position: Sitting   BP Method: Small (Manual)   Pulse: 64   Weight: 64 kg (141 lb 1.5 oz)   Height: 5' 6" (1.676 m)     Body mass index is 22.77 kg/m².    Assessment:       1. Annual physical exam    2. Hip pain, bilateral    3. Skin rash        Plan:   Ray was seen today for establish care.    Diagnoses and all orders for this visit:    Annual physical exam:  Age-appropriate health screening reviewed, indicated tests ordered.  -     Lipid panel; Future  -     Comprehensive metabolic panel; Future  -     CBC Without Differential; Future  -     TSH; Future    Hip pain, bilateral:  rule out osteoarthritis given FH of THR in mother; if xray normal likely bursitis/tendinitis, refer to PM&R. If abnormal refer to " Orthopedics.  -     X-Ray Hips Bilateral 2 View Incl AP Pelvis; Future    Skin rash:  possibly folliculitis, no systemic symptoms or spread, symptoms are resolving, continue to monitor.    Return in about 1 year (around 11/21/2018) for EPP annual exam. Labs today.  Balaji Naylor MD  Internal Medicine    Portions of this note were completed using [employer] dictation software. Please excuse typographical or syntax errors.

## 2017-11-24 DIAGNOSIS — E87.5 HYPERKALEMIA: Primary | ICD-10-CM

## 2017-11-30 ENCOUNTER — OFFICE VISIT (OUTPATIENT)
Dept: OBSTETRICS AND GYNECOLOGY | Facility: CLINIC | Age: 32
End: 2017-11-30
Payer: COMMERCIAL

## 2017-11-30 VITALS
BODY MASS INDEX: 22.6 KG/M2 | SYSTOLIC BLOOD PRESSURE: 102 MMHG | HEIGHT: 66 IN | WEIGHT: 140.63 LBS | DIASTOLIC BLOOD PRESSURE: 70 MMHG

## 2017-11-30 DIAGNOSIS — T83.32XD INTRAUTERINE CONTRACEPTIVE DEVICE THREADS LOST, SUBSEQUENT ENCOUNTER: Primary | ICD-10-CM

## 2017-11-30 PROCEDURE — 99499 UNLISTED E&M SERVICE: CPT | Mod: S$GLB,,, | Performed by: OBSTETRICS & GYNECOLOGY

## 2017-11-30 PROCEDURE — 58301 REMOVE INTRAUTERINE DEVICE: CPT | Mod: S$GLB,,, | Performed by: OBSTETRICS & GYNECOLOGY

## 2017-11-30 PROCEDURE — 99999 PR PBB SHADOW E&M-EST. PATIENT-LVL IV: CPT | Mod: PBBFAC,,, | Performed by: OBSTETRICS & GYNECOLOGY

## 2017-12-07 ENCOUNTER — PATIENT MESSAGE (OUTPATIENT)
Dept: OBSTETRICS AND GYNECOLOGY | Facility: CLINIC | Age: 32
End: 2017-12-07

## 2017-12-08 ENCOUNTER — TELEPHONE (OUTPATIENT)
Dept: OBSTETRICS AND GYNECOLOGY | Facility: CLINIC | Age: 32
End: 2017-12-08

## 2017-12-08 ENCOUNTER — HOSPITAL ENCOUNTER (OUTPATIENT)
Dept: PREADMISSION TESTING | Facility: OTHER | Age: 32
Discharge: HOME | End: 2017-12-08
Attending: OBSTETRICS & GYNECOLOGY
Payer: COMMERCIAL

## 2017-12-08 VITALS
DIASTOLIC BLOOD PRESSURE: 72 MMHG | WEIGHT: 140 LBS | SYSTOLIC BLOOD PRESSURE: 124 MMHG | HEART RATE: 74 BPM | OXYGEN SATURATION: 100 % | BODY MASS INDEX: 22.5 KG/M2 | TEMPERATURE: 98 F | HEIGHT: 66 IN

## 2017-12-08 RX ORDER — FAMOTIDINE 20 MG/1
20 TABLET, FILM COATED ORAL
Status: CANCELLED | OUTPATIENT
Start: 2017-12-08 | End: 2017-12-08

## 2017-12-08 RX ORDER — SODIUM CHLORIDE, SODIUM LACTATE, POTASSIUM CHLORIDE, CALCIUM CHLORIDE 600; 310; 30; 20 MG/100ML; MG/100ML; MG/100ML; MG/100ML
INJECTION, SOLUTION INTRAVENOUS CONTINUOUS
Status: CANCELLED | OUTPATIENT
Start: 2017-12-08

## 2017-12-08 RX ORDER — MIDAZOLAM HYDROCHLORIDE 5 MG/ML
5 INJECTION INTRAMUSCULAR; INTRAVENOUS
Status: ACTIVE | OUTPATIENT
Start: 2017-12-08 | End: 2017-12-08

## 2017-12-08 NOTE — TELEPHONE ENCOUNTER
----- Message from Alanna Tolliver sent at 12/8/2017 12:03 PM CST -----  Contact: self  _x  1st Request  _  2nd Request  _  3rd Request    Who:pt    Why: pt returning call.. Please advise    What Number to Call Back: [phone]    When to Expect a call back: (Before the end of the day)   -- if call after 3:00 call back will be tomorrow.

## 2017-12-08 NOTE — TELEPHONE ENCOUNTER
Called to informed pt that she can come in on Monday at 1pm she is to have nothing by mouth 8 hours prior she may drink clear liquids. She is to take 800mg of ibuprofen 30 minutes prior to coming in. Pt stated that she understood.

## 2017-12-08 NOTE — TELEPHONE ENCOUNTER
----- Message from Mae Roman sent at 12/8/2017  9:10 AM CST -----  Contact: pt  x_  1st Request  _  2nd Request  _  3rd Request      Who:pt    Why: pt calling in regards to details of her procedure on Monday pt also states that she has a  questions regarding a ultrasound    What Number to Call Back: [phone]    When to Expect a call back: (Before the end of the day)   -- if call after 3:00 call back will be tomorrow.

## 2017-12-08 NOTE — TELEPHONE ENCOUNTER
Left VM for pt to find out can she come in on Tuesday  To attempt her IUD removal in office w/ ultrasound guidance per   And if she is unsuccessful then she will reschedule for the OR.

## 2017-12-08 NOTE — DISCHARGE INSTRUCTIONS
PRE-ADMIT TESTING -  [phone]    [address]  [facility]          Your surgery has been scheduled at [facility]. We are pleased to have the opportunity to serve you. For Further Information please call [phone].    On the day of surgery please report to the Information Desk on the 1st floor.    · CONTACT YOUR PHYSICIAN'S OFFICE THE DAY PRIOR TO YOUR SURGERY TO OBTAIN YOUR ARRIVAL TIME.     · The evening before surgery do not eat anything after 9 p.m. ( this includes hard candy, chewing gum and mints).  You may only have GATORADE, POWERADE AND WATER  from 9 p.m. until you leave your home.   DO NOT DRINK ANY LIQUIDS ON THE WAY TO THE HOSPITAL.      SPECIAL MEDICATION INSTRUCTIONS: TAKE medications checked off by the Anesthesiologist on your Medication List.    Angiogram Patients: Take medications as instructed by your physician, including aspirin.     Surgery Patients:    If you take ASPIRIN - Your PHYSICIAN/SURGEON will need to inform you IF/OR when you need to stop taking aspirin prior to your surgery.     Do Not take any medications containing IBUPROFEN.  Do Not Wear any make-up or dark nail polish   (especially eye make-up) to surgery. If you come to surgery with makeup on you will be required to remove the makeup or nail polish.    Do not shave your surgical area at least 5 days prior to your surgery. The surgical prep will be performed at the hospital according to Infection Control regulations.    Leave all valuables at home.   Do Not wear any jewelry or watches, including any metal in body piercings.  Contact Lens must be removed before surgery. Either do not wear the contact lens or bring a case and solution for storage.  Please bring a container for eyeglasses or dentures as required.  Bring any paperwork your physician has provided, such as consent forms,  history and physicals, doctor's orders, etc.   Bring comfortable clothes that are loose fitting to wear upon  discharge. Take into consideration the type of surgery being performed.  Maintain your diet as advised per your physician the day prior to surgery.      Adequate rest the night before surgery is advised.   Park in the Parking lot behind the hospital or in the [facility] across the street from the parking lot. Parking is complimentary.  If you will be discharged the same day as your procedure, please arrange for a responsible adult to drive you home or to accompany you if traveling by taxi.   YOU WILL NOT BE PERMITTED TO DRIVE OR TO LEAVE THE HOSPITAL ALONE AFTER SURGERY.   It is strongly recommended that you arrange for someone to remain with you for the first 24 hrs following your surgery.       Thank you for your cooperation.  The Staff of [facility].        Bathing Instructions                                                                 Please shower the evening before and morning of your procedure with    ANTIBACTERIAL SOAP. ( DIAL, etc )  Concentrate on the surgical area   for at least 3 minutes and rinse completely. Dry off as usual.   Do not use any deodorant, powder, body lotions, perfume, after shave or    cologne.

## 2017-12-11 ENCOUNTER — PROCEDURE VISIT (OUTPATIENT)
Dept: OBSTETRICS AND GYNECOLOGY | Facility: CLINIC | Age: 32
End: 2017-12-11
Attending: OBSTETRICS & GYNECOLOGY
Payer: COMMERCIAL

## 2017-12-11 VITALS
DIASTOLIC BLOOD PRESSURE: 80 MMHG | WEIGHT: 144.63 LBS | SYSTOLIC BLOOD PRESSURE: 120 MMHG | BODY MASS INDEX: 23.24 KG/M2 | HEIGHT: 66 IN

## 2017-12-11 DIAGNOSIS — T83.32XD INTRAUTERINE CONTRACEPTIVE DEVICE THREADS LOST, SUBSEQUENT ENCOUNTER: Primary | ICD-10-CM

## 2017-12-11 PROCEDURE — 76998 US GUIDE INTRAOP: CPT | Mod: S$GLB,,, | Performed by: OBSTETRICS & GYNECOLOGY

## 2017-12-11 PROCEDURE — 58301 REMOVE INTRAUTERINE DEVICE: CPT | Mod: S$GLB,,, | Performed by: OBSTETRICS & GYNECOLOGY

## 2017-12-11 NOTE — PROCEDURES
Procedures   DATE: 12/11/2017    TIME: 1:53 PM    PROCEDURE:  Mirena removal, previously attempted removal in the office, returns for attempted removal under U/S guidance.    INDICATION: Ray Ugarte is a 32 y.o. female who presents for IUD removal secondary to lost IUD strings.      PRE-IUD REMOVAL COUNSELING:  The patient was advised of minimal risks of bleeding and pain and she agrees to proceed.    PROCEDURE:  TIME OUT PERFORMED.  IUD strings were not  visualized at the os. A cervical block of 1% lidocaine without epinephrine was performed by injecting the face of the cervix at 2, 4, 8, and 10 o'clock then anteriorly.   The ultrasound was used to visualize the IUD in the uterus. A single tooth tenaculum was placed on the anterior lip of the cervix.   An attempt was made to remove the IUD with a ellie clamp, then polyp forceps, ultimately, an IUD hook was used to remove with gentle traction.  The patient tolerated the procedure well.  monsel's solution was used to make the tenaculum sites hemostatic.    COMPLICATIONS: None    PATIENT DISPOSITION: The patient tolerated the procedure well.    ASSESSMENT:  Contraceptive Management / Removal IUD. V25.0.    POST IUD REMOVAL COUNSELING:  Expect period-like flow to occur after Mirena IUD removal and periods to return to pre-IUD pattern.  Manage post IUD removal cramping with NSAIDs, Tylenol or Rx per [employer].    POST IUD REMOVAL CONTRACEPTION:  If planning pregnancy, patient instructed to begin prenatal vitamins.     Counseling lasted approximately 15 minutes and all her questions were answered.    FOLLOW-UP: With me for annual gyn exam or prn.    Josie Khan DO 12/11/2017 1:53 PM

## 2018-02-26 NOTE — PROGRESS NOTES
"CC:IUD strings lost    HPI:patient presents for removal of IUD due to retained strings, will perform under ultrasound guidance    ROS:  GENERAL: Feeling well overall. Denies fever or chills.   SKIN: Denies rash or lesions.   HEAD: Denies head injury or headache.   NODES: Denies enlarged lymph nodes.   CHEST: Denies chest pain or shortness of breath.   CARDIOVASCULAR: Denies palpitations or left sided chest pain.   ABDOMEN: No abdominal pain, constipation, diarrhea, nausea, vomiting or rectal bleeding.   URINARY: No dysuria, hematuria, or burning on urination.  REPRODUCTIVE: See HPI.   BREASTS: Denies pain, lumps, or nipple discharge.   HEMATOLOGIC: No easy bruisability or excessive bleeding.   MUSCULOSKELETAL: Denies joint pain or swelling.   NEUROLOGIC: Denies syncope or weakness.   PSYCHIATRIC: Denies depression, anxiety or mood swings.    PE:  /70   Ht 5' 6" (1.676 m)   Wt 63.8 kg (140 lb 10.5 oz)   LMP  (LMP Unknown)   BMI 22.70 kg/m²      APPEARANCE: Well nourished, well developed, White female in no acute distress.  DATE: 12/11/17    TIME: 4:56 PM    PROCEDURE:  Mirena removal    INDICATION: Ray Ugarte is a 32 y.o. female who presents for IUD removal secondary to desire for pregnancy, previously unable to remove, presents to remove under ultrasound guidance.      PRE-IUD REMOVAL COUNSELING:  The patient was advised of minimal risks of bleeding and pain and she agrees to proceed.    PROCEDURE:  TIME OUT PERFORMED.  IUD strings were not  visualized at the os. IUD removed with gentle traction with IUD hook under ultrasound guidance after unable to remove with ellie clamp or polyp forceps.  The patient tolerated the procedure well.    COMPLICATIONS: None    PATIENT DISPOSITION: The patient tolerated the procedure well.    ASSESSMENT:  Contraceptive Management / Removal IUD. V25.0.    POST IUD REMOVAL COUNSELING:  Expect period-like flow to occur after Mirena IUD removal and periods to return to " pre-IUD pattern.  Manage post IUD removal cramping with NSAIDs, Tylenol or Rx per [facility].    POST IUD REMOVAL CONTRACEPTION:  If planning pregnancy, patient instructed to begin prenatal vitamins.     Counseling lasted approximately 15 minutes and all her questions were answered.    FOLLOW-UP: With me for annual gyn exam or prn.    Josie Khan DO 02/26/2018 4:56 PM            Diagnosis:  1. Intrauterine contraceptive device threads lost, subsequent encounter        Plan:     Orders Placed This Encounter    Case Request Operating Room: HYSTEROSCOPY-OPERATIVE, removal IUD- cancelled     Josie Khan DO

## 2018-03-12 ENCOUNTER — TELEPHONE (OUTPATIENT)
Dept: OBSTETRICS AND GYNECOLOGY | Facility: CLINIC | Age: 33
End: 2018-03-12

## 2018-03-12 DIAGNOSIS — N91.2 AMENORRHEA: Primary | ICD-10-CM

## 2018-03-12 NOTE — TELEPHONE ENCOUNTER
----- Message from Vanessa Medina MA sent at 3/9/2018  4:44 PM CST -----  Contact: KATE VALLADARES [[id number]]      ----- Message -----  From: Keara Cornejo  Sent: 3/9/2018   2:14 PM  To: Erin Galindo Staff    x_  1st Request  _  2nd Request  _  3rd Request        Who: KATE VALLADARES [[id number]]    Why: Requesting a call back in regards to getting an appointment to confirm pregnancy.    Please return the call at earliest convenience. Thanks!    What Number to Call Back: [phone]      When to Expect a call back: (Within 24 hours)

## 2018-03-13 ENCOUNTER — TELEPHONE (OUTPATIENT)
Dept: OBSTETRICS AND GYNECOLOGY | Facility: CLINIC | Age: 33
End: 2018-03-13

## 2018-03-13 NOTE — TELEPHONE ENCOUNTER
----- Message from Gaby Dunn sent at 3/13/2018 10:45 AM CDT -----  Contact: pt  x_ 1st Request  _ 2nd Request  _ 3rd Request    Who: pt    Why: is needing to schedule a initial ob appointment. Pt  LMP was 01/31/18    What Number to Call Back: [phone]    When to Expect a call back: (Before the end of the day)  -- if call after 3:00 call back will be tomorrow.

## 2018-03-15 ENCOUNTER — TELEPHONE (OUTPATIENT)
Dept: OBSTETRICS AND GYNECOLOGY | Facility: CLINIC | Age: 33
End: 2018-03-15

## 2018-03-15 ENCOUNTER — HOSPITAL ENCOUNTER (EMERGENCY)
Facility: OTHER | Age: 33
Discharge: HOME | End: 2018-03-15
Attending: EMERGENCY MEDICINE
Payer: COMMERCIAL

## 2018-03-15 VITALS
DIASTOLIC BLOOD PRESSURE: 66 MMHG | OXYGEN SATURATION: 100 % | WEIGHT: 140 LBS | SYSTOLIC BLOOD PRESSURE: 120 MMHG | RESPIRATION RATE: 16 BRPM | HEART RATE: 76 BPM | BODY MASS INDEX: 22.5 KG/M2 | HEIGHT: 66 IN | TEMPERATURE: 98 F

## 2018-03-15 DIAGNOSIS — O03.9 MISCARRIAGE: Primary | ICD-10-CM

## 2018-03-15 LAB
ABO GROUP BLD: NORMAL
ALBUMIN SERPL BCP-MCNC: 4 G/DL
ALP SERPL-CCNC: 44 U/L
ALT SERPL W/O P-5'-P-CCNC: 10 U/L
ANION GAP SERPL CALC-SCNC: 8 MMOL/L
AST SERPL-CCNC: 13 U/L
B-HCG UR QL: POSITIVE
BASOPHILS # BLD AUTO: 0.02 K/UL
BASOPHILS NFR BLD: 0.3 %
BILIRUB SERPL-MCNC: 0.3 MG/DL
BUN SERPL-MCNC: 6 MG/DL
CALCIUM SERPL-MCNC: 9.5 MG/DL
CHLORIDE SERPL-SCNC: 107 MMOL/L
CO2 SERPL-SCNC: 24 MMOL/L
CREAT SERPL-MCNC: 0.8 MG/DL
CTP QC/QA: YES
DIFFERENTIAL METHOD: NORMAL
EOSINOPHIL # BLD AUTO: 0.1 K/UL
EOSINOPHIL NFR BLD: 1.9 %
ERYTHROCYTE [DISTWIDTH] IN BLOOD BY AUTOMATED COUNT: 12.4 %
EST. GFR  (AFRICAN AMERICAN): >60 ML/MIN/1.73 M^2
EST. GFR  (NON AFRICAN AMERICAN): >60 ML/MIN/1.73 M^2
GLUCOSE SERPL-MCNC: 94 MG/DL
HCG INTACT+B SERPL-ACNC: 11 MIU/ML
HCT VFR BLD AUTO: 38.2 %
HGB BLD-MCNC: 12.9 G/DL
LYMPHOCYTES # BLD AUTO: 2.1 K/UL
LYMPHOCYTES NFR BLD: 37.2 %
MCH RBC QN AUTO: 29.9 PG
MCHC RBC AUTO-ENTMCNC: 33.8 G/DL
MCV RBC AUTO: 89 FL
MONOCYTES # BLD AUTO: 0.3 K/UL
MONOCYTES NFR BLD: 5 %
NEUTROPHILS # BLD AUTO: 3.2 K/UL
NEUTROPHILS NFR BLD: 55.3 %
PLATELET # BLD AUTO: 221 K/UL
PMV BLD AUTO: 9.9 FL
POTASSIUM SERPL-SCNC: 4.2 MMOL/L
PROT SERPL-MCNC: 7.6 G/DL
RBC # BLD AUTO: 4.31 M/UL
RH BLD: NORMAL
SODIUM SERPL-SCNC: 139 MMOL/L
WBC # BLD AUTO: 5.75 K/UL

## 2018-03-15 PROCEDURE — 80053 COMPREHEN METABOLIC PANEL: CPT

## 2018-03-15 PROCEDURE — 85025 COMPLETE CBC W/AUTO DIFF WBC: CPT

## 2018-03-15 PROCEDURE — 81025 URINE PREGNANCY TEST: CPT | Performed by: EMERGENCY MEDICINE

## 2018-03-15 PROCEDURE — 86901 BLOOD TYPING SEROLOGIC RH(D): CPT

## 2018-03-15 PROCEDURE — 99284 EMERGENCY DEPT VISIT MOD MDM: CPT | Mod: 25

## 2018-03-15 PROCEDURE — 84702 CHORIONIC GONADOTROPIN TEST: CPT

## 2018-03-15 NOTE — ED PROVIDER NOTES
Encounter Date: 3/15/2018       History     Chief Complaint   Patient presents with    Vaginal Bleeding     pt states she was 6 weeks pregnant started having vaginal bleeding this monrning     Patient is a 32-year-old female  who presents to the emergency department with vaginal bleeding in early pregnancy.  Patient states she is approximately 6 weeks pregnant.  She states she had her first positive pregnancy test last week.  She states she has not seen her OB/GYN yet for this pregnancy.  She states over the last couple of days she has been having vaginal spotting.  She states today she began to have heavy bleeding.  She states she is passing clots.  She reports cramping with pain in the pelvic region and her back.  She denies nausea or vomiting.  She denies fevers or chills.      The history is provided by the patient.     Review of patient's allergies indicates:   Allergen Reactions    Shellfish containing products Nausea And Vomiting      Only mussels     Past Medical History:   Diagnosis Date    Abnormal Pap smear of cervix ?    No procedures necessary. Repeat pap normal     Past Surgical History:   Procedure Laterality Date     SECTION  04/24/2015    x1    LASIK       Family History   Problem Relation Age of Onset    No Known Problems Mother     Cancer Father 75     Pancreatic    Breast cancer Neg Hx     Colon cancer Neg Hx     Ovarian cancer Neg Hx      Social History   Substance Use Topics    Smoking status: Never Smoker    Smokeless tobacco: Never Used    Alcohol use Yes      Comment: socially     Review of Systems   Constitutional: Negative for activity change, appetite change, chills, fatigue and fever.   HENT: Negative for congestion, ear discharge, ear pain, postnasal drip, rhinorrhea, sore throat and trouble swallowing.    Respiratory: Negative for cough and shortness of breath.    Cardiovascular: Negative for chest pain.   Gastrointestinal: Negative for abdominal pain,  blood in stool, constipation, diarrhea, nausea and vomiting.   Genitourinary: Positive for pelvic pain and vaginal bleeding. Negative for dysuria, flank pain and menstrual problem.   Musculoskeletal: Positive for back pain. Negative for neck pain and neck stiffness.   Skin: Negative for rash and wound.   Neurological: Negative for dizziness, speech difficulty, weakness, light-headedness, numbness and headaches.       Physical Exam     Initial Vitals [03/15/18 0855]   BP Pulse Resp Temp SpO2   121/74 89 14 97.9 °F (36.6 °C) 100 %      MAP       89.67         Physical Exam    Nursing note and vitals reviewed.  Constitutional: She appears well-developed and well-nourished. She is not diaphoretic.  Non-toxic appearance. No distress.   HENT:   Head: Normocephalic.   Right Ear: Hearing and external ear normal.   Left Ear: Hearing and external ear normal.   Nose: Nose normal.   Mouth/Throat: Uvula is midline, oropharynx is clear and moist and mucous membranes are normal. Mucous membranes are not pale. No trismus in the jaw. No uvula swelling. No oropharyngeal exudate.   Eyes: Conjunctivae are normal. Pupils are equal, round, and reactive to light.   Neck: Normal range of motion.   Cardiovascular: Normal rate and regular rhythm.   Pulmonary/Chest: Breath sounds normal. No respiratory distress. She has no wheezes. She has no rhonchi. She has no rales.   Abdominal: Soft. Bowel sounds are normal. She exhibits no distension and no mass. There is no tenderness. There is no rebound and no guarding.   Genitourinary:   Genitourinary Comments: Large blood clot in vaginal vault.  Cervix is closed.  No adnexal tenderness.  No cervical motion tenderness.   Lymphadenopathy:     She has no cervical adenopathy.   Neurological: She is alert and oriented to person, place, and time.   Skin: Skin is warm and dry. Capillary refill takes less than 2 seconds.   Psychiatric: She has a normal mood and affect.         ED Course   Procedures  Labs  Reviewed   COMPREHENSIVE METABOLIC PANEL - Abnormal; Notable for the following:        Result Value    Alkaline Phosphatase 44 (*)     All other components within normal limits   POCT URINE PREGNANCY - Abnormal; Notable for the following:     POC Preg Test, Ur Positive (*)     All other components within normal limits   CBC W/ AUTO DIFFERENTIAL   HCG, QUANTITATIVE, PREGNANCY   GROUP & RH         Imaging Results          US OB Less Than 14 Wks with Transvag(xpd (Final result)  Result time 03/15/18 11:11:23    Final result by Gadiel Rainey MD (03/15/18 11:11:23)                 Impression:        No intrauterine pregnancy, endometrial thickening, or adnexal mass identified. Recommend followup beta-hCG and/or pelvic ultrasound, if indicated.      Electronically signed by: Gadiel Rainey MD  Date:     03/15/18  Time:    11:11              Narrative:    Comparison: None.    Results: Trans-abdominal and transvaginal pelvic ultrasound performed. The uterus measures 8.5 cm in length and 4.4 x 4.2 cm in transverse dimensions. Normal endometrial thickness measuring 0.6 cm. No intrauterine pregnancy identified. The right ovary measures 3.0 x 2.6 x 3.2 cm. Left ovary measures 3.4 x 1.9 x 3.8 cm..  No significant pelvic free fluid noted.                                   Medical Decision Making:   Initial Assessment:   Urgent evaluation of 32-year-old female  who is approximately 6 weeks pregnant who presents to the emergency department with vaginal bleeding in early pregnancy.  Patient is afebrile, nontoxic appearing, and hemodynamically stable.  Benign abdominal exam.  On pelvic exam, there is a large blood clot in vaginal vault.  After blood clot is cleared from vaginal vault.  There is no excessive bleeding.  The cervix is closed.  No products of conception.  No adnexal tenderness or cervical motion tenderness.  Labs are obtained showing stable H&H.  Patient is A+, so RhoGAM is not warranted.  Beta hCG is 11.  Ultrasound  reveals no IUP.  I suspect patient is miscarrying and has passed all products.  Case is discussed with OB/GYN Dr. Cortez.  She advised close follow-up in Dr. Khan's office.  Patient is given bleeding precautions.  Patient is advised to return to the emergency department with any worsening symptoms or concerns.  Other:   I have discussed this case with another health care provider.       <> Summary of the Discussion: Dr. Maloney                      Clinical Impression:   The encounter diagnosis was Miscarriage.                           Magui French PA-C  03/15/18 1144

## 2018-03-15 NOTE — ED NOTES
Pt presents to the ED with c/o vaginal bleeding and lower abdominal cramping that started this morning. Pt states it is not enough to saturate a pad. Pt reports she is apx 6 weeks pregnant and had +UPT at home but reports this morning it said -. Pt appears non toxic and in no signs of acute distress.

## 2018-03-15 NOTE — TELEPHONE ENCOUNTER
Pt called in regards of a possible miscarriage. Pt stated that she woke up this morning with bright red blood with stomach and back pain. Pt was informed to head to the ED to be evaluated. Pt verbalized understanding.

## 2018-03-16 ENCOUNTER — TELEPHONE (OUTPATIENT)
Dept: OBSTETRICS AND GYNECOLOGY | Facility: CLINIC | Age: 33
End: 2018-03-16

## 2018-03-16 ENCOUNTER — PES CALL (OUTPATIENT)
Dept: ADMINISTRATIVE | Facility: CLINIC | Age: 33
End: 2018-03-16

## 2018-03-16 NOTE — TELEPHONE ENCOUNTER
----- Message from Cathy Gaston sent at 3/16/2018  9:14 AM CDT -----  Contact: KATE VALLADARES [[id number]]  _x  1st Request  _  2nd Request  _  3rd Request        Who: KATE VALLADARES [[id number]]    Why: patient states she would like to schedule a follow up as soon as possible after having a miscarriage. Next available appt was 4/24/18.     What Number to Call Back: [phone]    When to Expect a call back: (Before the end of the day)   -- if call after 3:00 call back will be tomorrow.

## 2018-03-16 NOTE — TELEPHONE ENCOUNTER
----- Message from Alanna Tolliver sent at 3/16/2018  3:17 PM CDT -----  Contact: self  _x  1st Request  _  2nd Request  _  3rd Request    Who: pt    Why: pt needs to speak with a nurse in regards to having a miscarriage.. Please advise    What Number to Call Back: [phone]    When to Expect a call back: (Before the end of the day)   -- if call after 3:00 call back will be tomorrow.

## 2018-03-22 ENCOUNTER — LAB VISIT (OUTPATIENT)
Dept: LAB | Facility: OTHER | Age: 33
End: 2018-03-22
Attending: OBSTETRICS & GYNECOLOGY
Payer: COMMERCIAL

## 2018-03-22 ENCOUNTER — OFFICE VISIT (OUTPATIENT)
Dept: OBSTETRICS AND GYNECOLOGY | Facility: CLINIC | Age: 33
End: 2018-03-22
Payer: COMMERCIAL

## 2018-03-22 VITALS
HEIGHT: 66 IN | SYSTOLIC BLOOD PRESSURE: 100 MMHG | BODY MASS INDEX: 23.74 KG/M2 | WEIGHT: 147.69 LBS | DIASTOLIC BLOOD PRESSURE: 70 MMHG

## 2018-03-22 DIAGNOSIS — O03.9 SAB (SPONTANEOUS ABORTION): Primary | ICD-10-CM

## 2018-03-22 DIAGNOSIS — O03.9 SAB (SPONTANEOUS ABORTION): ICD-10-CM

## 2018-03-22 PROBLEM — Z97.5 IUD (INTRAUTERINE DEVICE) IN PLACE: Status: RESOLVED | Noted: 2017-05-18 | Resolved: 2018-03-22

## 2018-03-22 LAB — HCG INTACT+B SERPL-ACNC: 1.4 MIU/ML

## 2018-03-22 PROCEDURE — 99213 OFFICE O/P EST LOW 20 MIN: CPT | Mod: S$GLB,,, | Performed by: OBSTETRICS & GYNECOLOGY

## 2018-03-22 PROCEDURE — 36415 COLL VENOUS BLD VENIPUNCTURE: CPT

## 2018-03-22 PROCEDURE — 84702 CHORIONIC GONADOTROPIN TEST: CPT

## 2018-03-22 NOTE — PROGRESS NOTES
Gynecology    SUBJECTIVE:     Chief Complaint: f/u SAB       History of Present Illness:  32 year old who presents for follow up of SAB.  She was approximately 6 weeks pregnant when she presented to the ED with heavy bleeding.  U/S showed nothing in the uterus and an hCG of 11.  Patient is still having some light cramping, but no more bleeding.     Review of Systems:  Review of Systems   Constitutional: Negative for fever.   Gastrointestinal: Negative for nausea and vomiting.   Genitourinary: Negative for menorrhagia, menstrual problem, pelvic pain, vaginal bleeding, vaginal discharge and vaginal pain.   Musculoskeletal: Positive for back pain.        OBJECTIVE:     Physical Exam:  Physical Exam   Constitutional: She is oriented to person, place, and time. She appears well-developed and well-nourished.   Pulmonary/Chest: Effort normal.   Abdominal: Soft.   Genitourinary: Vagina normal and uterus normal. No labial fusion. There is no rash, tenderness, lesion or injury on the right labia. There is no rash, tenderness, lesion or injury on the left labia. Uterus is not deviated, not enlarged, not fixed and not tender. Cervix exhibits no motion tenderness, no discharge and no friability. Right adnexum displays no mass, no tenderness and no fullness. Left adnexum displays no mass, no tenderness and no fullness. No erythema, tenderness or bleeding in the vagina. No foreign body in the vagina. No signs of injury around the vagina. No vaginal discharge found.   Neurological: She is alert and oriented to person, place, and time.   Psychiatric: She has a normal mood and affect. Her behavior is normal. Judgment and thought content normal.   Nursing note and vitals reviewed.      Chaperoned by: Elizabeth    ASSESSMENT:       ICD-10-CM ICD-9-CM    1. SAB (spontaneous ) O03.9 634.90 hCG, quantitative          Plan:      Ray was seen today for f/u sab.    Diagnoses and all orders for this visit:    SAB (spontaneous  )  -     hCG, quantitative; Future    - reassured as to the causes of t1 miscarriages; advised that 15% of first trimseter pregnancies result in miscarriages; advised to repeat beta hCG today; consider waiting one menstrual cycle before attempting pregnancy again;     Orders Placed This Encounter   Procedures    hCG, quantitative       Follow-up for annual or prn.    Dayna Fischer

## 2018-03-23 ENCOUNTER — TELEPHONE (OUTPATIENT)
Dept: OBSTETRICS AND GYNECOLOGY | Facility: CLINIC | Age: 33
End: 2018-03-23

## 2018-03-23 NOTE — TELEPHONE ENCOUNTER
----- Message from Magui French PA-C sent at 3/15/2018 11:46 AM CDT -----  Hi Dr. Khan.  Just wanted to touch base with you about your patient Ms. Ugarte.  On exam, she had large blood clot in vaginal vault.  No products of conception.  Cervix was closed.  H & H was stable and pt was RH positive.  U/S revealed no IUP and beta was only 11.  I suspect patient has miscarried.  Case is discussed with Dr. Cortez who advised her to follow up with you in the next couple of days.  Thanks.

## 2018-06-05 ENCOUNTER — OFFICE VISIT (OUTPATIENT)
Dept: INTERNAL MEDICINE | Facility: CLINIC | Age: 33
End: 2018-06-05
Payer: COMMERCIAL

## 2018-06-05 VITALS
OXYGEN SATURATION: 100 % | WEIGHT: 148.69 LBS | DIASTOLIC BLOOD PRESSURE: 64 MMHG | HEIGHT: 66 IN | BODY MASS INDEX: 23.89 KG/M2 | HEART RATE: 88 BPM | SYSTOLIC BLOOD PRESSURE: 114 MMHG

## 2018-06-05 DIAGNOSIS — N94.89 UTERINE CRAMPING: ICD-10-CM

## 2018-06-05 DIAGNOSIS — S29.011A MUSCLE STRAIN OF CHEST WALL, INITIAL ENCOUNTER: Primary | ICD-10-CM

## 2018-06-05 DIAGNOSIS — S39.012A STRAIN OF MUSCLE, FASCIA AND TENDON OF LOWER BACK, INITIAL ENCOUNTER: ICD-10-CM

## 2018-06-05 DIAGNOSIS — Z32.00 POSSIBLE PREGNANCY, NOT CONFIRMED: ICD-10-CM

## 2018-06-05 LAB
B-HCG UR QL: NEGATIVE
CTP QC/QA: YES

## 2018-06-05 PROCEDURE — 3008F BODY MASS INDEX DOCD: CPT | Mod: CPTII,S$GLB,, | Performed by: FAMILY MEDICINE

## 2018-06-05 PROCEDURE — 99999 PR PBB SHADOW E&M-EST. PATIENT-LVL III: CPT | Mod: PBBFAC,,, | Performed by: FAMILY MEDICINE

## 2018-06-05 PROCEDURE — 99214 OFFICE O/P EST MOD 30 MIN: CPT | Mod: 25,S$GLB,, | Performed by: FAMILY MEDICINE

## 2018-06-05 PROCEDURE — 81025 URINE PREGNANCY TEST: CPT | Mod: S$GLB,,, | Performed by: FAMILY MEDICINE

## 2018-06-05 RX ORDER — MELOXICAM 7.5 MG/1
7.5 TABLET ORAL DAILY
Qty: 30 TABLET | Refills: 0 | Status: SHIPPED | OUTPATIENT
Start: 2018-06-05 | End: 2018-08-07

## 2018-06-05 RX ORDER — TIZANIDINE 4 MG/1
4 TABLET ORAL 2 TIMES DAILY PRN
Qty: 30 TABLET | Refills: 0 | Status: SHIPPED | OUTPATIENT
Start: 2018-06-05 | End: 2018-06-15

## 2018-06-05 NOTE — PROGRESS NOTES
"Subjective:       Patient ID: Ray Ugarte is a 32 y.o. female.    Chief Complaint: Abdominal Pain    HPI 33 y/o female here with pain over R flank, axilla, lower back, vaginal.  Started two nights ago, she has recently done trapeze work this past Thursday while [place] and Friday her soreness started, pain has gotten worse, pain is R side of arm, axilla and chest and lower back, the pain is achy, was sharp and crampy around her uterus and into her vagina, the pain comes and goes, she tried 400mg of ibuprofen which helped some.  She is fairly active in general.  Denies f/n/v/d/constipation/cp/sob/urinary sx.   Gyn: cycles have been regular, she had miscarriage 2 months ago, followed by Dr. Khan    Review of Systems   Constitutional: Negative for activity change, appetite change, fatigue and fever.   Respiratory: Negative for cough and shortness of breath.    Cardiovascular: Negative for chest pain, palpitations and leg swelling.   Gastrointestinal: Negative for constipation, diarrhea, nausea and vomiting.   Genitourinary: Negative for difficulty urinating and dysuria.   Musculoskeletal: Positive for arthralgias, back pain and myalgias.   Skin: Negative for rash.   Neurological: Negative for dizziness and light-headedness.   Psychiatric/Behavioral: Negative for sleep disturbance.       Objective:      /64   Pulse 88   Ht 5' 6" (1.676 m)   Wt 67.4 kg (148 lb 11.2 oz)   LMP 05/18/2018 (Exact Date)   SpO2 100%   BMI 24.00 kg/m²   Physical Exam   Constitutional: She appears well-developed and well-nourished.   HENT:   Head: Normocephalic and atraumatic.   Mouth/Throat: No oropharyngeal exudate.   Neck: Normal range of motion. Neck supple. No thyromegaly present.   Cardiovascular: Normal rate, regular rhythm and normal heart sounds.    Pulmonary/Chest: Effort normal and breath sounds normal. No respiratory distress.   Abdominal: Soft. Bowel sounds are normal. She exhibits no distension. There is no " tenderness.   Musculoskeletal: She exhibits tenderness. She exhibits no edema.   Limited rom R arm  Tender over R axilla, R flank, R lumbar paraspinals   Lymphadenopathy:     She has no cervical adenopathy.   Neurological: She is alert.   Skin: Skin is warm and dry.   Psychiatric: She has a normal mood and affect.   Nursing note and vitals reviewed.      Assessment:       1. Muscle strain of chest wall, initial encounter    2. Possible pregnancy, not confirmed    3. Strain of muscle, fascia and tendon of lower back, initial encounter    4. Uterine cramping        Plan:   Ray was seen today for abdominal pain.    Diagnoses and all orders for this visit:    Muscle strain of chest wall, initial encounter  -     meloxicam (MOBIC) 7.5 MG tablet; Take 1 tablet (7.5 mg total) by mouth once daily.  -     tiZANidine (ZANAFLEX) 4 MG tablet; Take 1 tablet (4 mg total) by mouth 2 (two) times daily as needed.    Possible pregnancy, not confirmed  -     POCT Urine Pregnancy    Strain of muscle, fascia and tendon of lower back, initial encounter  -     meloxicam (MOBIC) 7.5 MG tablet; Take 1 tablet (7.5 mg total) by mouth once daily.  -     tiZANidine (ZANAFLEX) 4 MG tablet; Take 1 tablet (4 mg total) by mouth 2 (two) times daily as needed.    Uterine cramping     I discussed with the patient that the possible cramping and sharp pains vaginally could still be part of the muscle strain from the trapeze experience.  She will give the meloxicam and muscle relaxer a try for a few days and let me know on Thursday if she is not doing better.  At that point we can order a transvaginal ultrasound to further evaluate given her recent miscarriage.

## 2018-06-12 ENCOUNTER — OFFICE VISIT (OUTPATIENT)
Dept: INTERNAL MEDICINE | Facility: CLINIC | Age: 33
End: 2018-06-12
Payer: COMMERCIAL

## 2018-06-12 VITALS
TEMPERATURE: 98 F | DIASTOLIC BLOOD PRESSURE: 72 MMHG | BODY MASS INDEX: 23.77 KG/M2 | HEIGHT: 66 IN | SYSTOLIC BLOOD PRESSURE: 108 MMHG | HEART RATE: 71 BPM | OXYGEN SATURATION: 97 % | WEIGHT: 147.94 LBS

## 2018-06-12 DIAGNOSIS — R31.9 URINARY TRACT INFECTION WITH HEMATURIA, SITE UNSPECIFIED: Primary | ICD-10-CM

## 2018-06-12 DIAGNOSIS — N39.0 URINARY TRACT INFECTION WITH HEMATURIA, SITE UNSPECIFIED: Primary | ICD-10-CM

## 2018-06-12 DIAGNOSIS — R21 RASH: ICD-10-CM

## 2018-06-12 LAB
BACTERIA #/AREA URNS AUTO: ABNORMAL /HPF
BILIRUB SERPL-MCNC: ABNORMAL MG/DL
BILIRUB UR QL STRIP: NEGATIVE
BLOOD URINE, POC: ABNORMAL
CLARITY UR REFRACT.AUTO: ABNORMAL
COLOR UR AUTO: YELLOW
COLOR, POC UA: ABNORMAL
GLUCOSE UR QL STRIP: NEGATIVE
GLUCOSE UR QL STRIP: NORMAL
HGB UR QL STRIP: ABNORMAL
KETONES UR QL STRIP: ABNORMAL
KETONES UR QL STRIP: NEGATIVE
LEUKOCYTE ESTERASE UR QL STRIP: ABNORMAL
LEUKOCYTE ESTERASE URINE, POC: ABNORMAL
MICROSCOPIC COMMENT: ABNORMAL
NITRITE UR QL STRIP: NEGATIVE
NITRITE, POC UA: ABNORMAL
NON-SQ EPI CELLS #/AREA URNS AUTO: 2 /HPF
PH UR STRIP: 6 [PH] (ref 5–8)
PH, POC UA: 6
PROT UR QL STRIP: NEGATIVE
PROTEIN, POC: ABNORMAL
RBC #/AREA URNS AUTO: 6 /HPF (ref 0–4)
SP GR UR STRIP: 1 (ref 1–1.03)
SPECIFIC GRAVITY, POC UA: 1.01
SQUAMOUS #/AREA URNS AUTO: 4 /HPF
URN SPEC COLLECT METH UR: ABNORMAL
UROBILINOGEN UR STRIP-ACNC: NEGATIVE EU/DL
UROBILINOGEN, POC UA: NORMAL
WBC #/AREA URNS AUTO: >100 /HPF (ref 0–5)
WBC CLUMPS UR QL AUTO: ABNORMAL

## 2018-06-12 PROCEDURE — 99213 OFFICE O/P EST LOW 20 MIN: CPT | Mod: 25,S$GLB,, | Performed by: NURSE PRACTITIONER

## 2018-06-12 PROCEDURE — 81002 URINALYSIS NONAUTO W/O SCOPE: CPT | Mod: S$GLB,,, | Performed by: NURSE PRACTITIONER

## 2018-06-12 PROCEDURE — 87088 URINE BACTERIA CULTURE: CPT

## 2018-06-12 PROCEDURE — 3008F BODY MASS INDEX DOCD: CPT | Mod: CPTII,S$GLB,, | Performed by: NURSE PRACTITIONER

## 2018-06-12 PROCEDURE — 99999 PR PBB SHADOW E&M-EST. PATIENT-LVL IV: CPT | Mod: PBBFAC,,, | Performed by: NURSE PRACTITIONER

## 2018-06-12 PROCEDURE — 81001 URINALYSIS AUTO W/SCOPE: CPT

## 2018-06-12 PROCEDURE — 87186 SC STD MICRODIL/AGAR DIL: CPT

## 2018-06-12 PROCEDURE — 87077 CULTURE AEROBIC IDENTIFY: CPT

## 2018-06-12 PROCEDURE — 87086 URINE CULTURE/COLONY COUNT: CPT

## 2018-06-12 RX ORDER — CLOTRIMAZOLE AND BETAMETHASONE DIPROPIONATE 10; .64 MG/G; MG/G
CREAM TOPICAL 2 TIMES DAILY
Qty: 45 G | Refills: 0 | Status: SHIPPED | OUTPATIENT
Start: 2018-06-12 | End: 2018-06-22

## 2018-06-12 RX ORDER — SULFAMETHOXAZOLE AND TRIMETHOPRIM 800; 160 MG/1; MG/1
1 TABLET ORAL 2 TIMES DAILY
Qty: 14 TABLET | Refills: 0 | Status: SHIPPED | OUTPATIENT
Start: 2018-06-12 | End: 2018-06-19

## 2018-06-12 NOTE — PROGRESS NOTES
Subjective:       Patient ID: Ray Ugarte is a 32 y.o. female.    Chief Complaint: No chief complaint on file.    HPI  Review of Systems   Constitutional: Negative for fever.   Gastrointestinal: Positive for abdominal pain and nausea. Negative for constipation, diarrhea and vomiting.   Genitourinary: Negative for dysuria, frequency and hematuria.   Musculoskeletal: Positive for arthralgias and myalgias.   Neurological: Negative for headaches.       Objective:      Physical Exam    Assessment:       No diagnosis found.    Plan:       ***

## 2018-06-13 NOTE — PROGRESS NOTES
"Subjective:       Patient ID: Ray Ugarte is a 32 y.o. female.    Chief Complaint: Abdominal Pain    HPI:  33 yo female that presents to clinic today with complaint of abdominal pain/back pain and rash.    States that the abdominal pain and back pain started about a week ago.  Saw her PCP back on 06/5/18 who treated her for muscle strain with meloxicam and tizanidine.  Patient states that she is feeling a little better today but is still having left lower back and left lower abdominal pain.    Denies any fever, dysuria, gross hematuria, urgency or frequency.  States that her bowel movements are normal.    Patient also states that she has a rash to her right hand that has been there for a few days.  States that it is red and "itchy."  Denies being bit or coming into contact with anything she is allergic to.       Review of Systems   Constitutional: Negative for activity change, appetite change and fever.   Respiratory: Negative for apnea, cough, shortness of breath and wheezing.    Cardiovascular: Negative for chest pain, palpitations and leg swelling.   Gastrointestinal: Positive for abdominal pain and nausea. Negative for constipation, diarrhea and vomiting.   Genitourinary: Positive for flank pain. Negative for difficulty urinating, dysuria, frequency, hematuria, pelvic pain, urgency and vaginal discharge.   Musculoskeletal: Positive for arthralgias and myalgias.   Skin: Positive for color change and rash.   Neurological: Negative for dizziness, light-headedness, numbness and headaches.   Psychiatric/Behavioral: Negative for behavioral problems.       Objective:      Physical Exam   Constitutional: She is oriented to person, place, and time. She appears well-developed and well-nourished. No distress.   Cardiovascular: Normal rate, regular rhythm, normal heart sounds and intact distal pulses.    No murmur heard.  Pulmonary/Chest: Effort normal and breath sounds normal. No respiratory distress. She has no " wheezes. She has no rales.   Abdominal: Normal appearance and bowel sounds are normal. There is tenderness in the left lower quadrant. There is no rigidity, no rebound, no guarding, no CVA tenderness, no tenderness at McBurney's point and negative Kee's sign.       Musculoskeletal:        Lumbar back: She exhibits tenderness. She exhibits normal range of motion, no swelling, no deformity, no pain and no spasm.        Back:    Neurological: She is alert and oriented to person, place, and time. No cranial nerve deficit or sensory deficit.   Skin: Rash noted. Rash is maculopapular.        Pruritic maculopapular rash to right hand.  There is some crusting and scaling noted.   Psychiatric: Her behavior is normal.       Assessment:       1. Urinary tract infection with hematuria, site unspecified    2. Rash        Plan:     1.  UTI  -UA dipstick suggestive of UTI.  2+ WBC, positive nitrites and significant blood.  -Will send off for urinalysis and culture.  -Will start on course of bactrim po bid x 7 days.  -Encouraged to increase water intake.    2.  Rash  -Appears fungal.  -Will prescribe Lotrisone cream bid x 10 days for treatment.

## 2018-06-15 LAB — BACTERIA UR CULT: NORMAL

## 2018-08-01 ENCOUNTER — TELEPHONE (OUTPATIENT)
Dept: OBSTETRICS AND GYNECOLOGY | Facility: CLINIC | Age: 33
End: 2018-08-01

## 2018-08-01 NOTE — TELEPHONE ENCOUNTER
----- Message from Marcelle Romano sent at 8/1/2018  9:46 AM CDT -----  Contact: KATE VALLADARES [[id number]]            Name of Who is Calling: KATE VALLADARES [[id number]]    What is the request in detail: Pt is calling to get scheduled for her initial ob.  Pt last menstrual  June 20 and at home confirm pregnancy July 29.  Please contact pt to further discuss and advise.    Can the clinic reply by [facility]: No      What [facility] Back [facility]: [phone]          Left message for patient

## 2018-08-05 ENCOUNTER — NURSE TRIAGE (OUTPATIENT)
Dept: ADMINISTRATIVE | Facility: CLINIC | Age: 33
End: 2018-08-05

## 2018-08-05 NOTE — TELEPHONE ENCOUNTER
Patient called to report the following:     -LMP June 20th  -denies passing clots   -denies fever abd pain   -mild back cramping and vaginal bleeding     Education completed per [facility] On Call Care Advice including follow up with provider within 3 days and when to call back.Patient verbalized understating.              Reason for Disposition   MILD vaginal bleeding (i.e., clots or similar to menstrual period; not just spotting)    Protocols used: ST PREGNANCY - VAGINAL BLEEDING LESS THAN 20 WEEKS EGA-A-AH

## 2018-08-06 ENCOUNTER — TELEPHONE (OUTPATIENT)
Dept: OBSTETRICS AND GYNECOLOGY | Facility: CLINIC | Age: 33
End: 2018-08-06

## 2018-08-06 NOTE — TELEPHONE ENCOUNTER
----- Message from Gaby Dunn sent at 8/6/2018  8:02 AM CDT -----  Contact: pt            Name of Who is Calling: pt      What is the request in detail: pt is experiencing vaginal spotting while pregnant and was told by the nurse to call in to office       Can the clinic reply by [facility]: no      What Number to Call Back if not in [facility]: [phone]          Attempted to contact patient no answer.

## 2018-08-06 NOTE — TELEPHONE ENCOUNTER
----- Message from Grisel Negron sent at 8/6/2018 10:34 AM CDT -----  Contact: self  Pt is calling to discuss some light spotting and some pain she is experiencing she is currently 6 weeks pregnant. The pt can be reached at [phone].

## 2018-08-07 ENCOUNTER — LAB VISIT (OUTPATIENT)
Dept: LAB | Facility: OTHER | Age: 33
End: 2018-08-07
Attending: OBSTETRICS & GYNECOLOGY
Payer: COMMERCIAL

## 2018-08-07 ENCOUNTER — PROCEDURE VISIT (OUTPATIENT)
Dept: OBSTETRICS AND GYNECOLOGY | Facility: CLINIC | Age: 33
End: 2018-08-07
Attending: OBSTETRICS & GYNECOLOGY
Payer: COMMERCIAL

## 2018-08-07 DIAGNOSIS — Z34.90 PREGNANCY WITH FETUS OF UNKNOWN GESTATIONAL AGE: ICD-10-CM

## 2018-08-07 DIAGNOSIS — O36.80X0 PREGNANCY OF UNKNOWN ANATOMIC LOCATION: ICD-10-CM

## 2018-08-07 DIAGNOSIS — O36.80X0 ENCOUNTER TO DETERMINE FETAL VIABILITY OF PREGNANCY, SINGLE OR UNSPECIFIED FETUS: ICD-10-CM

## 2018-08-07 DIAGNOSIS — N91.2 AMENORRHEA: ICD-10-CM

## 2018-08-07 DIAGNOSIS — O20.9 VAGINAL BLEEDING IN PREGNANCY, FIRST TRIMESTER: ICD-10-CM

## 2018-08-07 LAB — HCG INTACT+B SERPL-ACNC: 220 MIU/ML

## 2018-08-07 PROCEDURE — 84702 CHORIONIC GONADOTROPIN TEST: CPT

## 2018-08-07 PROCEDURE — 76817 TRANSVAGINAL US OBSTETRIC: CPT | Mod: S$GLB,,, | Performed by: OBSTETRICS & GYNECOLOGY

## 2018-08-07 PROCEDURE — 36415 COLL VENOUS BLD VENIPUNCTURE: CPT

## 2018-08-07 NOTE — PROCEDURES
Procedures   Obstetrical ultrasound completed today.  See report in imaging section of [facility].

## 2018-08-09 ENCOUNTER — TELEPHONE (OUTPATIENT)
Dept: OBSTETRICS AND GYNECOLOGY | Facility: HOSPITAL | Age: 33
End: 2018-08-09

## 2018-08-09 ENCOUNTER — TELEPHONE (OUTPATIENT)
Dept: OBSTETRICS AND GYNECOLOGY | Facility: CLINIC | Age: 33
End: 2018-08-09

## 2018-08-09 ENCOUNTER — NURSE TRIAGE (OUTPATIENT)
Dept: ADMINISTRATIVE | Facility: CLINIC | Age: 33
End: 2018-08-09

## 2018-08-09 ENCOUNTER — LAB VISIT (OUTPATIENT)
Dept: LAB | Facility: OTHER | Age: 33
End: 2018-08-09
Attending: OBSTETRICS & GYNECOLOGY
Payer: COMMERCIAL

## 2018-08-09 DIAGNOSIS — Z34.90 PREGNANCY WITH FETUS OF UNKNOWN GESTATIONAL AGE: ICD-10-CM

## 2018-08-09 LAB — HCG INTACT+B SERPL-ACNC: 155 MIU/ML

## 2018-08-09 PROCEDURE — 36415 COLL VENOUS BLD VENIPUNCTURE: CPT

## 2018-08-09 PROCEDURE — 84702 CHORIONIC GONADOTROPIN TEST: CPT

## 2018-08-09 RX ORDER — ACETAMINOPHEN AND CODEINE PHOSPHATE 300; 30 MG/1; MG/1
1 TABLET ORAL EVERY 4 HOURS PRN
Qty: 20 TABLET | Refills: 0 | Status: SHIPPED | OUTPATIENT
Start: 2018-08-09 | End: 2018-08-19

## 2018-08-09 RX ORDER — MISOPROSTOL 200 UG/1
800 TABLET ORAL ONCE
Qty: 4 TABLET | Refills: 1 | Status: SHIPPED | OUTPATIENT
Start: 2018-08-09 | End: 2018-08-09

## 2018-08-09 NOTE — TELEPHONE ENCOUNTER
Called patient to discuss results of her Beta- Hcg. Patient states that she is having severe cramping and some vaginal bleeding.   Discussed options for management and would like to try cytotec and pain medications.  Precautions given, all medications sent to pharmacy

## 2018-08-09 NOTE — TELEPHONE ENCOUNTER
Patient called this am to discuss significant abdominal pain she is experiencing. Patient is roughly 4-5 weeks pregnant and was told at her last ultrasound that there was no fetal pole and she may have a blighted ovum. Patient opted for conservative management and is scheduled for [facility] this am. Patient called due to significant right sided abdominal pain. Patient denies significant vaginal bleeding stating she is only noticing dark brown spotting. Denies fever, chills, nausea, vomitng. Advised patient to come to ED for evaluation. Patient declined due to cost of last ED visit. Further advised patient if she does not wish to come to ED she should be seen in clinic today for evaluation.     Patient expressed understanding and stated she would be seen in clinic today.     Verenice Esquivel MD  OBGYN, PGY-1

## 2018-08-09 NOTE — TELEPHONE ENCOUNTER
Reason for Disposition   Constant abdominal pain lasting > 2 hours    Protocols used: ST ABDOMINAL PAIN - FEMALE-A-OH

## 2018-08-09 NOTE — TELEPHONE ENCOUNTER
----- Message from Verenice Esquivel MD sent at 8/9/2018  6:41 AM CDT -----  Regarding: needs appointment dulce Dey,     I spoke with this patient this morning around 6:30 am. She is in early pregnancy having significant abdominal pain. Her ultrasound 8/7/18 indicated no intrauterine pregnancy. She was due for repeat beta this am. Patient declined to come to ED due to cost. Can you assist with getting this patient an appointment today?    Thank you,     Verenice Esquivel         
Adequate: hears normal conversation without difficulty

## 2018-08-14 ENCOUNTER — TELEPHONE (OUTPATIENT)
Dept: OBSTETRICS AND GYNECOLOGY | Facility: CLINIC | Age: 33
End: 2018-08-14

## 2018-08-14 NOTE — TELEPHONE ENCOUNTER
Spoke with patient patient states she would like the lab order in her and Dr Khan spoke about so she can get it done tomorrow. Advised patient I will let Dr Khan know. Patient verbalized and understand

## 2018-08-14 NOTE — TELEPHONE ENCOUNTER
----- Message from Marcelle Romano sent at 8/14/2018 10:47 AM CDT -----  Contact: KATE VALLADARES [[id number]]            Name of Who is Calling: KATE VALLADARES [[id number]]      What is the request in detail: Pt is calling to request her hcg levels to be check along with a blood test to help determine the cause of her miscarriage.  Please contact pt to further discuss and advise.    Can the clinic reply by [facility]: No      What Number to Call Back if not in [facility]: [phone]      Left message for patient

## 2018-08-15 ENCOUNTER — LAB VISIT (OUTPATIENT)
Dept: LAB | Facility: OTHER | Age: 33
End: 2018-08-15
Attending: OBSTETRICS & GYNECOLOGY
Payer: COMMERCIAL

## 2018-08-15 DIAGNOSIS — Z34.90 PREGNANCY WITH FETUS OF UNKNOWN GESTATIONAL AGE: ICD-10-CM

## 2018-08-15 DIAGNOSIS — N96 HISTORY OF RECURRENT MISCARRIAGES: ICD-10-CM

## 2018-08-15 DIAGNOSIS — N96 HISTORY OF RECURRENT MISCARRIAGES: Primary | ICD-10-CM

## 2018-08-15 LAB — HCG INTACT+B SERPL-ACNC: 138 MIU/ML

## 2018-08-15 PROCEDURE — 36415 COLL VENOUS BLD VENIPUNCTURE: CPT

## 2018-08-15 PROCEDURE — 84702 CHORIONIC GONADOTROPIN TEST: CPT

## 2018-08-17 ENCOUNTER — TELEPHONE (OUTPATIENT)
Dept: OBSTETRICS AND GYNECOLOGY | Facility: CLINIC | Age: 33
End: 2018-08-17

## 2018-08-17 DIAGNOSIS — N96 HISTORY OF RECURRENT MISCARRIAGES: Primary | ICD-10-CM

## 2018-08-17 NOTE — TELEPHONE ENCOUNTER
----- Message from berkley MALDONADO Shaun sent at 8/17/2018  8:30 AM CDT -----  Contact: pt            Name of Who is Calling: [t      What is the request in detail: is returning a call      Can the clinic reply by [facility]: no      What [facility] [phone]          Spoke with patient regarding lab orders. Patient states she wants the lab work done today. Advised patient per Dr Khan patient need to get the lab work done next week. Patient had lab work done on Tuesday already. Patient states if she can't get any lab work today its pointless to keep her appointment on Tuesday. Advised patient to still keep her appointment on Tuesday so that way she can discuss her concerns with Dr Khan. Patient also states she not happy that I told her not to get the lab work done today and if she decide to get it done she will go to the lab. I'm not sure if patient going to go to the lab today or wait until Tuesday as advised. Patient also wanted the names of the labs she was getting done. I gave patient the names of the labs she should be getting done.

## 2018-08-20 ENCOUNTER — LAB VISIT (OUTPATIENT)
Dept: LAB | Facility: OTHER | Age: 33
End: 2018-08-20
Attending: OBSTETRICS & GYNECOLOGY
Payer: COMMERCIAL

## 2018-08-20 ENCOUNTER — TELEPHONE (OUTPATIENT)
Dept: OBSTETRICS AND GYNECOLOGY | Facility: CLINIC | Age: 33
End: 2018-08-20

## 2018-08-20 DIAGNOSIS — N96 HISTORY OF RECURRENT MISCARRIAGES: ICD-10-CM

## 2018-08-20 PROCEDURE — 86146 BETA-2 GLYCOPROTEIN ANTIBODY: CPT

## 2018-08-20 PROCEDURE — 86147 CARDIOLIPIN ANTIBODY EA IG: CPT | Mod: 59

## 2018-08-20 PROCEDURE — 85613 RUSSELL VIPER VENOM DILUTED: CPT

## 2018-08-20 NOTE — TELEPHONE ENCOUNTER
----- Message from Consuelo Pisano sent at 8/20/2018 12:35 PM CDT -----  Contact: pt   Name of Who is Calling: KATE VALLADARES [[id number]]    What is the request in detail:patient is requesting to have a second opinion in regards to having miscarriages.....Please contact to further discuss and advise      Can the clinic reply by [facility]: Yes    What Number to Call Back [facility]: [phone]

## 2018-08-21 ENCOUNTER — TELEPHONE (OUTPATIENT)
Dept: OBSTETRICS AND GYNECOLOGY | Facility: CLINIC | Age: 33
End: 2018-08-21

## 2018-08-21 LAB
CARDIOLIPIN IGG SER IA-ACNC: <9.4 GPL
CARDIOLIPIN IGM SER IA-ACNC: <9.4 MPL

## 2018-08-21 NOTE — TELEPHONE ENCOUNTER
----- Message from Marcelle Romano sent at 8/21/2018  8:58 AM CDT -----  Contact: KATE VALLADARES [[id number]]            Name of Who is Calling: KATE VALLADARES [[id number]]    What is the request in detail: Pt is returning the missed call from clinical staff.  There was no note or message to relate to pt.  Pt says that she want to know if she need to come in today or not prior to arriving for her scheduled visit today.  Please contact pt to further discuss and advise.      Can the clinic reply by [facility]: No      What Number to Call Back [facility] not [facility]: [phone]        Left message for patient. Patient do not need to come to into clinic today. Per Dr Khan patient do not need to come in today results are in as of now. Will notify patient once the results are back for patient to schedule to come into discuss.

## 2018-08-23 ENCOUNTER — OFFICE VISIT (OUTPATIENT)
Dept: OBSTETRICS AND GYNECOLOGY | Facility: CLINIC | Age: 33
End: 2018-08-23
Attending: OBSTETRICS & GYNECOLOGY
Payer: COMMERCIAL

## 2018-08-23 ENCOUNTER — TELEPHONE (OUTPATIENT)
Dept: OBSTETRICS AND GYNECOLOGY | Facility: CLINIC | Age: 33
End: 2018-08-23

## 2018-08-23 VITALS
SYSTOLIC BLOOD PRESSURE: 110 MMHG | DIASTOLIC BLOOD PRESSURE: 70 MMHG | WEIGHT: 150.81 LBS | HEIGHT: 66 IN | BODY MASS INDEX: 24.24 KG/M2

## 2018-08-23 DIAGNOSIS — O03.4 INCOMPLETE ABORTION: Primary | ICD-10-CM

## 2018-08-23 LAB
B2 GLYCOPROT1 IGA SER QL: <9 SAU
B2 GLYCOPROT1 IGG SER QL: <9 SGU
B2 GLYCOPROT1 IGM SER QL: <9 SMU

## 2018-08-23 PROCEDURE — 99999 PR PBB SHADOW E&M-EST. PATIENT-LVL III: CPT | Mod: PBBFAC,,, | Performed by: OBSTETRICS & GYNECOLOGY

## 2018-08-23 PROCEDURE — 99213 OFFICE O/P EST LOW 20 MIN: CPT | Mod: S$GLB,,, | Performed by: OBSTETRICS & GYNECOLOGY

## 2018-08-23 PROCEDURE — 3008F BODY MASS INDEX DOCD: CPT | Mod: CPTII,S$GLB,, | Performed by: OBSTETRICS & GYNECOLOGY

## 2018-08-23 RX ORDER — OXYCODONE AND ACETAMINOPHEN 5; 325 MG/1; MG/1
1 TABLET ORAL EVERY 4 HOURS PRN
Qty: 10 TABLET | Refills: 0 | Status: SHIPPED | OUTPATIENT
Start: 2018-08-23 | End: 2019-07-19

## 2018-08-23 RX ORDER — MISOPROSTOL 200 UG/1
TABLET ORAL
Refills: 1 | COMMUNITY
Start: 2018-08-09 | End: 2019-07-19

## 2018-08-23 NOTE — TELEPHONE ENCOUNTER
Called patient.  She had left a message on my cell phone, reports that she has been having increased bleeding and pain, did not take misoprostol as previously prescribed.  Passing some clots. Declines going to ED for eval as she dies not want to pay the $300 copay.  Will be seen in office today at 4pm, did inform patient that there is a chance that she will still need to be seen in the ED for this if problems arise that are not able to be managed in the office.  Voiced understanding.  Added to schedule.

## 2018-08-24 LAB — LA PPP-IMP: NEGATIVE

## 2018-08-28 ENCOUNTER — LAB VISIT (OUTPATIENT)
Dept: LAB | Facility: OTHER | Age: 33
End: 2018-08-28
Attending: OBSTETRICS & GYNECOLOGY
Payer: COMMERCIAL

## 2018-08-28 DIAGNOSIS — O03.4 INCOMPLETE ABORTION: ICD-10-CM

## 2018-08-28 LAB — HCG INTACT+B SERPL-ACNC: 93 MIU/ML

## 2018-08-28 PROCEDURE — 36415 COLL VENOUS BLD VENIPUNCTURE: CPT

## 2018-08-28 PROCEDURE — 84702 CHORIONIC GONADOTROPIN TEST: CPT

## 2018-08-30 ENCOUNTER — TELEPHONE (OUTPATIENT)
Dept: OBSTETRICS AND GYNECOLOGY | Facility: CLINIC | Age: 33
End: 2018-08-30

## 2018-08-30 ENCOUNTER — PATIENT MESSAGE (OUTPATIENT)
Dept: OBSTETRICS AND GYNECOLOGY | Facility: CLINIC | Age: 33
End: 2018-08-30

## 2018-08-30 DIAGNOSIS — O03.9 SAB (SPONTANEOUS ABORTION): Primary | ICD-10-CM

## 2018-08-30 NOTE — TELEPHONE ENCOUNTER
Patient reports that she took cytotec Thursday, reports that she is continuing to have vaginal bleeding through one pad per day, reports passing some clots, reports that they are small. Would like to proceed with ultrasound to assess if all POC have passed.  Will arrange this first thing in the AM.

## 2018-08-31 ENCOUNTER — TELEPHONE (OUTPATIENT)
Dept: OBSTETRICS AND GYNECOLOGY | Facility: CLINIC | Age: 33
End: 2018-08-31

## 2018-08-31 DIAGNOSIS — O03.9 SAB (SPONTANEOUS ABORTION): Primary | ICD-10-CM

## 2018-08-31 NOTE — TELEPHONE ENCOUNTER
----- Message from Cesar Singh sent at 8/31/2018 10:57 AM CDT -----  Contact: KATE VALLADARES [[id number]]            Name of Who is Calling: KATE VALLADARES [[id number]]      What is the request in detail: Needs to speak with staff about her upcoming ultrasound. States the insurance need it to be an inpatient office xray 11. Please advise    Can the clinic reply by [facility]: yes      What Number to [facility] Back [facility] not [facility]: [phone]             Spoke with patient regarding pelvic ultrasound. Patient states she would like the code to change. Advised patient we couldn't change the code. Advised patient I will have Dr Khan to call her. Patient verbalized and understand

## 2018-08-31 NOTE — TELEPHONE ENCOUNTER
Called patient to discuss upcoming ultrasound.  States that her insurance is stating that they will charge her $187 for ultrasound.  Discussed that this cannot be coded as a dating ultrasound because she is known to have an SAB.  Patient aagreed that she will go forward with ultrasound.  Will follow up with results.

## 2018-09-01 ENCOUNTER — HOSPITAL ENCOUNTER (OUTPATIENT)
Dept: RADIOLOGY | Facility: OTHER | Age: 33
Discharge: HOME | End: 2018-09-01
Attending: OBSTETRICS & GYNECOLOGY
Payer: COMMERCIAL

## 2018-09-01 DIAGNOSIS — O03.9 SAB (SPONTANEOUS ABORTION): ICD-10-CM

## 2018-09-01 PROCEDURE — 76856 US EXAM PELVIC COMPLETE: CPT | Mod: 26,,, | Performed by: RADIOLOGY

## 2018-09-01 PROCEDURE — 76830 TRANSVAGINAL US NON-OB: CPT | Mod: 26,,, | Performed by: RADIOLOGY

## 2018-09-01 PROCEDURE — 76830 TRANSVAGINAL US NON-OB: CPT | Mod: TC

## 2018-09-05 ENCOUNTER — TELEPHONE (OUTPATIENT)
Dept: OBSTETRICS AND GYNECOLOGY | Facility: HOSPITAL | Age: 33
End: 2018-09-05

## 2018-09-05 ENCOUNTER — TELEPHONE (OUTPATIENT)
Dept: OBSTETRICS AND GYNECOLOGY | Facility: CLINIC | Age: 33
End: 2018-09-05

## 2018-09-05 NOTE — TELEPHONE ENCOUNTER
----- Message from Daisy Madera sent at 9/5/2018  3:12 PM CDT -----  Contact: Ray  Name of Who is Calling: Ray       What is the request in detail: Patient was returning a missed call back from the staff she states she can be reached on the portal as well      Can the clinic reply by [facility]: yes      What Number to Call Back if not in [facility]:[phone]

## 2019-02-07 ENCOUNTER — OFFICE VISIT (OUTPATIENT)
Dept: INTERNAL MEDICINE | Facility: CLINIC | Age: 34
End: 2019-02-07
Payer: COMMERCIAL

## 2019-02-07 VITALS
HEIGHT: 66 IN | WEIGHT: 147 LBS | OXYGEN SATURATION: 99 % | HEART RATE: 79 BPM | BODY MASS INDEX: 23.63 KG/M2 | SYSTOLIC BLOOD PRESSURE: 128 MMHG | DIASTOLIC BLOOD PRESSURE: 62 MMHG

## 2019-02-07 DIAGNOSIS — Z31.69 INFERTILITY COUNSELING: ICD-10-CM

## 2019-02-07 DIAGNOSIS — R21 SKIN RASH: ICD-10-CM

## 2019-02-07 DIAGNOSIS — Z00.00 HEALTH CARE MAINTENANCE: Primary | ICD-10-CM

## 2019-02-07 PROCEDURE — 99999 PR PBB SHADOW E&M-EST. PATIENT-LVL IV: ICD-10-PCS | Mod: PBBFAC,,, | Performed by: INTERNAL MEDICINE

## 2019-02-07 PROCEDURE — 99395 PREV VISIT EST AGE 18-39: CPT | Mod: S$GLB,,, | Performed by: INTERNAL MEDICINE

## 2019-02-07 PROCEDURE — 99395 PR PREVENTIVE VISIT,EST,18-39: ICD-10-PCS | Mod: S$GLB,,, | Performed by: INTERNAL MEDICINE

## 2019-02-07 PROCEDURE — 99999 PR PBB SHADOW E&M-EST. PATIENT-LVL IV: CPT | Mod: PBBFAC,,, | Performed by: INTERNAL MEDICINE

## 2019-02-07 RX ORDER — TRIAMCINOLONE ACETONIDE 1 MG/G
CREAM TOPICAL 2 TIMES DAILY
Qty: 30 G | Refills: 1 | Status: SHIPPED | OUTPATIENT
Start: 2019-02-07 | End: 2019-07-19

## 2019-02-07 NOTE — PATIENT INSTRUCTIONS
Apply triamcinolone cream to affected areas of hands two times a day for at least one week. If symptoms get worse or do not start improving, please notify the office.    Please call medical records at [facility] to have them make an imaging disc of pelvic ultrasound from September 2018 for review by Dr Cedeño.

## 2019-02-07 NOTE — PROGRESS NOTES
Subjective:       Patient ID: Ray Ugarte is a 33 y.o. female.    Chief Complaint: Annual Exam; Rash (Right hand); and Other (Fertility Concerns)    Last visit with me 11/2017.  Over the last year seen by Gynecology, Internal Medicine.    Patient reports noting a rash on the hands that has not gone away.  Affecting interdigitally spaces and aspect of the palm of the right hand.  Also some irritation between fingers and on lateral aspects of the hands.  Notes papules but no vesicles.  No one at home has had similar symptoms.  Has tried over-the-counter topical medications and was recently prescribed clotrimazole; clotrimazole potentially helping some however has not resolved the issue.  She has not been using clotrimazole regularly as prescribed due to burning symptoms.  She does report the skin feels itchy.  She denies any sick contacts and denies having seasonal allergies, no prior diagnosis of eczema.  She thinks this started many years ago with a small blister on the right hand and has progressed sense and severity.  No problems with the soles of the feet.    Patient reports recent miscarriages.  She was successfully able to conceive in the past.  Has been seen by an outside fertility clinic, their recommendation was for in-vitro fertilization.  Patient is interested in having a 2nd opinion.  Basal body temperature does not show spikes consistent with progesterone surge.  Home ovulation kit testing does suggest successful ovulation.    Rash   This is a chronic problem. The current episode started more than 1 year ago. The problem has been waxing and waning since onset. The affected locations include theright fingers. The rash is characterized by blistering, redness, draining and itchiness. She was exposed to nothing. Pertinent negatives include no anorexia, congestion, cough, diarrhea, eye pain, facial edema, fatigue, fever, joint pain, nail changes, rhinorrhea, shortness of breath, sore throat or  "vomiting. Past treatments include anti-itch cream and moisturizer. The treatment provided mild relief. Her past medical history is significant for allergies and varicella. There is no history of asthma or eczema.     Review of Systems   Constitutional: Negative for fatigue and fever.   HENT: Negative for congestion, rhinorrhea and sore throat.    Eyes: Negative for pain.   Respiratory: Negative for cough and shortness of breath.    Gastrointestinal: Negative for anorexia, diarrhea and vomiting.   Musculoskeletal: Negative for joint pain.   Skin: Positive for rash. Negative for nail changes.       Objective:      Physical Exam   Constitutional: She is oriented to person, place, and time. No distress.    woman whose Body mass index is 23.73 kg/m².    HENT:   Head: Normocephalic and atraumatic.   Right Ear: Tympanic membrane normal. No middle ear effusion.   Left Ear: Tympanic membrane normal.  No middle ear effusion.   Mouth/Throat: Oropharynx is clear and moist. No oropharyngeal exudate.   Eyes: Conjunctivae are normal. Right eye exhibits no discharge. Left eye exhibits no discharge.   Cardiovascular: Normal rate, regular rhythm and normal heart sounds.   Pulmonary/Chest: Effort normal and breath sounds normal. No stridor. She has no wheezes. She has no rales.   Neurological: She is alert and oriented to person, place, and time.   Skin: Skin is warm and dry. Rash (erythema w/ small blisters on R palm, also in interdigitary spaces in L&R hands) noted.   Psychiatric: She has a normal mood and affect. Her behavior is normal.   Nursing note and vitals reviewed.      Vitals:    02/07/19 1607   BP: 128/62   BP Location: Right arm   Patient Position: Sitting   BP Method: Large (Manual)   Pulse: 79   SpO2: 99%   Weight: 66.7 kg (147 lb)   Height: 5' 6" (1.676 m)     Body mass index is 23.73 kg/m².    RESULTS: Reviewed labs from last 24 months    Assessment:       1. Health care maintenance    2. Infertility " counseling    3. Skin rash        Plan:   Ray was seen today for annual exam, rash and other.    Diagnoses and all orders for this visit:    Health care maintenance:  Age-appropriate health screening reviewed, indicated tests ordered.   -     TSH; Future  -     Lipid panel; Future  -     Comprehensive metabolic panel; Future  -     CBC Without Differential; Future    Infertility counseling:  Refer to outside fertility specialist.  -     Ambulatory Referral to Endocrine Surgery  -     TSH; Future  -     Lipid panel; Future  -     Comprehensive metabolic panel; Future  -     CBC Without Differential; Future    Skin rash:  Prior dx, persistent problem, not resolved with clotrimazole. Low likelihood scabies or other infectious cause, likely eczema, try Kenalog. If not better 1 wk please notify the office.  -     triamcinolone acetonide 0.1% (KENALOG) 0.1 % cream; Apply topically 2 (two) times daily.    Follow-up in about 1 year (around 2/7/2020) for EPP annual exam. fasting labs next few days.  Balaji Naylor MD  Internal Medicine    Portions of this note were completed using medical dictation software. Please excuse typographical or syntax errors that were missed on review.

## 2019-02-08 ENCOUNTER — TELEPHONE (OUTPATIENT)
Dept: INTERNAL MEDICINE | Facility: CLINIC | Age: 34
End: 2019-02-08

## 2019-02-08 DIAGNOSIS — R74.8 ABNORMAL ALKALINE PHOSPHATASE TEST: Primary | ICD-10-CM

## 2019-02-08 NOTE — TELEPHONE ENCOUNTER
----- Message from Balaji Naylor MD sent at 2/7/2019  8:56 PM CST -----  Regarding: Please call [facility]  I was told that there were Gynecology providers in the [facility] who are familiar with Natural Family Planning and using basal body temperature to monitor fertility cycles. I haven't seen this in writing though. Please call their clinic at [address] [place] [address] (phone [phone]) to see if this is the case and which providers provide this service.

## 2019-02-09 ENCOUNTER — LAB VISIT (OUTPATIENT)
Dept: LAB | Facility: HOSPITAL | Age: 34
End: 2019-02-09
Attending: INTERNAL MEDICINE
Payer: COMMERCIAL

## 2019-02-09 ENCOUNTER — PATIENT MESSAGE (OUTPATIENT)
Dept: INTERNAL MEDICINE | Facility: CLINIC | Age: 34
End: 2019-02-09

## 2019-02-09 DIAGNOSIS — Z31.69 INFERTILITY COUNSELING: ICD-10-CM

## 2019-02-09 DIAGNOSIS — Z00.00 HEALTH CARE MAINTENANCE: ICD-10-CM

## 2019-02-09 LAB
ALBUMIN SERPL BCP-MCNC: 4.1 G/DL
ALP SERPL-CCNC: 36 U/L
ALT SERPL W/O P-5'-P-CCNC: 9 U/L
ANION GAP SERPL CALC-SCNC: 5 MMOL/L
AST SERPL-CCNC: 14 U/L
BILIRUB SERPL-MCNC: 0.4 MG/DL
BUN SERPL-MCNC: 7 MG/DL
CALCIUM SERPL-MCNC: 9.7 MG/DL
CHLORIDE SERPL-SCNC: 106 MMOL/L
CHOLEST SERPL-MCNC: 183 MG/DL
CHOLEST/HDLC SERPL: 2.6 {RATIO}
CO2 SERPL-SCNC: 27 MMOL/L
CREAT SERPL-MCNC: 0.8 MG/DL
ERYTHROCYTE [DISTWIDTH] IN BLOOD BY AUTOMATED COUNT: 12.7 %
EST. GFR  (AFRICAN AMERICAN): >60 ML/MIN/1.73 M^2
EST. GFR  (NON AFRICAN AMERICAN): >60 ML/MIN/1.73 M^2
GLUCOSE SERPL-MCNC: 91 MG/DL
HCT VFR BLD AUTO: 37.6 %
HDLC SERPL-MCNC: 70 MG/DL
HDLC SERPL: 38.3 %
HGB BLD-MCNC: 12.2 G/DL
LDLC SERPL CALC-MCNC: 104.4 MG/DL
MCH RBC QN AUTO: 29.5 PG
MCHC RBC AUTO-ENTMCNC: 32.4 G/DL
MCV RBC AUTO: 91 FL
NONHDLC SERPL-MCNC: 113 MG/DL
PLATELET # BLD AUTO: 262 K/UL
PMV BLD AUTO: 9.8 FL
POTASSIUM SERPL-SCNC: 4.5 MMOL/L
PROT SERPL-MCNC: 7.2 G/DL
RBC # BLD AUTO: 4.14 M/UL
SODIUM SERPL-SCNC: 138 MMOL/L
TRIGL SERPL-MCNC: 43 MG/DL
TSH SERPL DL<=0.005 MIU/L-ACNC: 1.92 UIU/ML
WBC # BLD AUTO: 6.09 K/UL

## 2019-02-09 PROCEDURE — 85027 COMPLETE CBC AUTOMATED: CPT

## 2019-02-09 PROCEDURE — 80061 LIPID PANEL: CPT

## 2019-02-09 PROCEDURE — 36415 COLL VENOUS BLD VENIPUNCTURE: CPT

## 2019-02-09 PROCEDURE — 80053 COMPREHEN METABOLIC PANEL: CPT

## 2019-02-09 PROCEDURE — 84443 ASSAY THYROID STIM HORMONE: CPT

## 2019-02-10 NOTE — TELEPHONE ENCOUNTER
Please call the patient to schedule nonfasting blood work sometime in the next few days.  I have sent the patient a message.

## 2019-02-21 ENCOUNTER — TELEPHONE (OUTPATIENT)
Dept: INTERNAL MEDICINE | Facility: CLINIC | Age: 34
End: 2019-02-21

## 2019-02-21 NOTE — TELEPHONE ENCOUNTER
recommend we repeat the test and check for zinc and vitamin B12 levels.  My staff will call to help schedule the appointment.      [facility]

## 2019-02-25 ENCOUNTER — LAB VISIT (OUTPATIENT)
Dept: LAB | Facility: HOSPITAL | Age: 34
End: 2019-02-25
Attending: INTERNAL MEDICINE
Payer: COMMERCIAL

## 2019-02-25 DIAGNOSIS — Z34.90 PREGNANCY WITH FETUS OF UNKNOWN GESTATIONAL AGE: ICD-10-CM

## 2019-02-25 DIAGNOSIS — R74.8 ABNORMAL ALKALINE PHOSPHATASE TEST: ICD-10-CM

## 2019-02-25 LAB
ALBUMIN SERPL BCP-MCNC: 3.9 G/DL
ALP SERPL-CCNC: 34 U/L
ALT SERPL W/O P-5'-P-CCNC: 10 U/L
AST SERPL-CCNC: 14 U/L
BILIRUB DIRECT SERPL-MCNC: 0.1 MG/DL
BILIRUB SERPL-MCNC: 0.3 MG/DL
HCG INTACT+B SERPL-ACNC: <1.2 MIU/ML
PROT SERPL-MCNC: 7 G/DL
VIT B12 SERPL-MCNC: 449 PG/ML

## 2019-02-25 PROCEDURE — 84630 ASSAY OF ZINC: CPT

## 2019-02-25 PROCEDURE — 82607 VITAMIN B-12: CPT

## 2019-02-25 PROCEDURE — 36415 COLL VENOUS BLD VENIPUNCTURE: CPT | Mod: PO

## 2019-02-25 PROCEDURE — 80076 HEPATIC FUNCTION PANEL: CPT

## 2019-02-25 PROCEDURE — 84702 CHORIONIC GONADOTROPIN TEST: CPT

## 2019-02-27 LAB — ZINC SERPL-MCNC: 59 UG/DL (ref 60–130)

## 2019-03-28 ENCOUNTER — PATIENT MESSAGE (OUTPATIENT)
Dept: OBSTETRICS AND GYNECOLOGY | Facility: CLINIC | Age: 34
End: 2019-03-28

## 2019-03-28 DIAGNOSIS — N92.6 LATE MENSES: Primary | ICD-10-CM

## 2019-03-29 ENCOUNTER — LAB VISIT (OUTPATIENT)
Dept: LAB | Facility: OTHER | Age: 34
End: 2019-03-29
Attending: OBSTETRICS & GYNECOLOGY
Payer: COMMERCIAL

## 2019-03-29 DIAGNOSIS — N92.6 LATE MENSES: ICD-10-CM

## 2019-03-29 LAB — HCG INTACT+B SERPL-ACNC: <1.2 MIU/ML

## 2019-03-29 PROCEDURE — 84702 CHORIONIC GONADOTROPIN TEST: CPT

## 2019-03-29 PROCEDURE — 36415 COLL VENOUS BLD VENIPUNCTURE: CPT

## 2019-06-12 ENCOUNTER — PATIENT MESSAGE (OUTPATIENT)
Dept: OBSTETRICS AND GYNECOLOGY | Facility: CLINIC | Age: 34
End: 2019-06-12

## 2019-06-12 ENCOUNTER — TELEPHONE (OUTPATIENT)
Dept: OBSTETRICS AND GYNECOLOGY | Facility: CLINIC | Age: 34
End: 2019-06-12

## 2019-06-12 ENCOUNTER — LAB VISIT (OUTPATIENT)
Dept: LAB | Facility: HOSPITAL | Age: 34
End: 2019-06-12
Attending: OBSTETRICS & GYNECOLOGY
Payer: COMMERCIAL

## 2019-06-12 DIAGNOSIS — Z34.90 PREGNANCY WITH FETUS OF UNKNOWN GESTATIONAL AGE: ICD-10-CM

## 2019-06-12 PROCEDURE — 36415 COLL VENOUS BLD VENIPUNCTURE: CPT

## 2019-06-12 PROCEDURE — 84702 CHORIONIC GONADOTROPIN TEST: CPT

## 2019-06-13 LAB — HCG INTACT+B SERPL-ACNC: 220 MIU/ML

## 2019-06-14 ENCOUNTER — TELEPHONE (OUTPATIENT)
Dept: OBSTETRICS AND GYNECOLOGY | Facility: CLINIC | Age: 34
End: 2019-06-14

## 2019-06-14 ENCOUNTER — LAB VISIT (OUTPATIENT)
Dept: LAB | Facility: HOSPITAL | Age: 34
End: 2019-06-14
Attending: OBSTETRICS & GYNECOLOGY
Payer: COMMERCIAL

## 2019-06-14 DIAGNOSIS — Z32.01 PREGNANCY TEST POSITIVE: Primary | ICD-10-CM

## 2019-06-14 DIAGNOSIS — O03.4 INCOMPLETE ABORTION: ICD-10-CM

## 2019-06-14 LAB — HCG INTACT+B SERPL-ACNC: 535 MIU/ML

## 2019-06-14 PROCEDURE — 36415 COLL VENOUS BLD VENIPUNCTURE: CPT

## 2019-06-14 PROCEDURE — 84702 CHORIONIC GONADOTROPIN TEST: CPT

## 2019-06-18 ENCOUNTER — TELEPHONE (OUTPATIENT)
Dept: OBSTETRICS AND GYNECOLOGY | Facility: CLINIC | Age: 34
End: 2019-06-18

## 2019-06-18 ENCOUNTER — PATIENT MESSAGE (OUTPATIENT)
Dept: OBSTETRICS AND GYNECOLOGY | Facility: CLINIC | Age: 34
End: 2019-06-18

## 2019-06-18 DIAGNOSIS — Z34.90 PREGNANCY, UNSPECIFIED GESTATIONAL AGE: Primary | ICD-10-CM

## 2019-07-19 ENCOUNTER — OFFICE VISIT (OUTPATIENT)
Dept: OBSTETRICS AND GYNECOLOGY | Facility: CLINIC | Age: 34
End: 2019-07-19
Attending: OBSTETRICS & GYNECOLOGY
Payer: COMMERCIAL

## 2019-07-19 ENCOUNTER — TELEPHONE (OUTPATIENT)
Dept: OBSTETRICS AND GYNECOLOGY | Facility: CLINIC | Age: 34
End: 2019-07-19

## 2019-07-19 VITALS
SYSTOLIC BLOOD PRESSURE: 116 MMHG | BODY MASS INDEX: 23.59 KG/M2 | HEIGHT: 66 IN | WEIGHT: 146.81 LBS | DIASTOLIC BLOOD PRESSURE: 64 MMHG

## 2019-07-19 DIAGNOSIS — N91.2 AMENORRHEA: Primary | ICD-10-CM

## 2019-07-19 DIAGNOSIS — Z32.01 POSITIVE PREGNANCY TEST: ICD-10-CM

## 2019-07-19 DIAGNOSIS — Z98.891 H/O CESAREAN SECTION: ICD-10-CM

## 2019-07-19 DIAGNOSIS — N91.2 AMENORRHEA: ICD-10-CM

## 2019-07-19 DIAGNOSIS — Z34.90 PREGNANCY, UNSPECIFIED GESTATIONAL AGE: ICD-10-CM

## 2019-07-19 DIAGNOSIS — Z34.90 PREGNANCY WITH FETUS OF UNKNOWN GESTATIONAL AGE: ICD-10-CM

## 2019-07-19 LAB
B-HCG UR QL: POSITIVE
C TRACH DNA SPEC QL NAA+PROBE: NOT DETECTED
CTP QC/QA: YES
N GONORRHOEA DNA SPEC QL NAA+PROBE: NOT DETECTED

## 2019-07-19 PROCEDURE — 81025 URINE PREGNANCY TEST: CPT | Mod: S$GLB,,, | Performed by: NURSE PRACTITIONER

## 2019-07-19 PROCEDURE — 87491 CHLMYD TRACH DNA AMP PROBE: CPT

## 2019-07-19 PROCEDURE — 81025 POCT URINE PREGNANCY: ICD-10-PCS | Mod: S$GLB,,, | Performed by: NURSE PRACTITIONER

## 2019-07-19 PROCEDURE — 76801 PR US, OB <14WKS, TRANSABD, SINGLE GESTATION: ICD-10-PCS | Mod: S$GLB,,, | Performed by: OBSTETRICS & GYNECOLOGY

## 2019-07-19 PROCEDURE — 99499 UNLISTED E&M SERVICE: CPT | Mod: S$GLB,,, | Performed by: OBSTETRICS & GYNECOLOGY

## 2019-07-19 PROCEDURE — 3008F PR BODY MASS INDEX (BMI) DOCUMENTED: ICD-10-PCS | Mod: CPTII,S$GLB,, | Performed by: NURSE PRACTITIONER

## 2019-07-19 PROCEDURE — 99214 OFFICE O/P EST MOD 30 MIN: CPT | Mod: 25,S$GLB,, | Performed by: NURSE PRACTITIONER

## 2019-07-19 PROCEDURE — 99499 NO LOS: ICD-10-PCS | Mod: S$GLB,,, | Performed by: OBSTETRICS & GYNECOLOGY

## 2019-07-19 PROCEDURE — 99999 PR PBB SHADOW E&M-EST. PATIENT-LVL III: CPT | Mod: PBBFAC,,, | Performed by: NURSE PRACTITIONER

## 2019-07-19 PROCEDURE — 99999 PR PBB SHADOW E&M-EST. PATIENT-LVL III: ICD-10-PCS | Mod: PBBFAC,,, | Performed by: NURSE PRACTITIONER

## 2019-07-19 PROCEDURE — 3008F BODY MASS INDEX DOCD: CPT | Mod: CPTII,S$GLB,, | Performed by: NURSE PRACTITIONER

## 2019-07-19 PROCEDURE — 76801 OB US < 14 WKS SINGLE FETUS: CPT | Mod: S$GLB,,, | Performed by: OBSTETRICS & GYNECOLOGY

## 2019-07-19 PROCEDURE — 99214 PR OFFICE/OUTPT VISIT, EST, LEVL IV, 30-39 MIN: ICD-10-PCS | Mod: 25,S$GLB,, | Performed by: NURSE PRACTITIONER

## 2019-07-19 PROCEDURE — 87086 URINE CULTURE/COLONY COUNT: CPT

## 2019-07-19 NOTE — LETTER
July 19, 2019      Josie Khan, DO  [address] [address]           [facility] [id number]  [address] [address]  Phone: [phone]  Fax: [phone]          Patient: Ray Ugarte   MR Number: [id number]   YOB: 1985   Date of Visit: 7/19/2019       Dear Dr. Josie Khan:    Thank you for referring Ray Ugarte to me for evaluation. Attached you will find relevant portions of my assessment and plan of care.    If you have questions, please do not hesitate to call me. I look forward to following Ray Ugarte along with you.    Sincerely,    Kim Damon, NP    Enclosure  CC:  No Recipients    If you would like to receive this communication electronically, please contact [email or web address] or [phone] to request more information on [employer] Link access.    For providers and/or their staff who would like to refer a patient to [facility], please contact us through our one-stop-shop provider referral line, [facility], at [phone].    If you feel you have received this communication in error or would no longer like to receive these types of communications, please e-mail [email or web address]

## 2019-07-19 NOTE — PROGRESS NOTES
"  CC: Positive Pregnancy Test    HISTORY OF PRESENT ILLNESS:    Ray Ugarte is a 33 y.o. female, ,  Presents today for a routine exam complaining of amenorrhea and positive home urine pregnancy test.  Patient's last menstrual period was 2019 (approximate).  Doing well. C/o nausea, no vomiting. Doing acupuncture which helps the nausea. No vaginal bleeding. Ultrasound following this appointment. Prior SAB x 1 and LTCS for FTP. Daughter is 4 years old now. No problems or questions today.    Reports nausea. Reports breast tenderness. Denies pelvic pain.    LMP: 19  EGA: 9w6d  EDC: 2/15/20    ROS:  GENERAL: No weight changes. No swelling. No fatigue. No fever.  CARDIOVASCULAR: No chest pain. No shortness of breath. No leg cramps.   NEUROLOGICAL: No headaches. No vision changes.  BREASTS: No pain. No lumps. No discharge.  ABDOMEN: No pain. No diarrhea. No constipation.  REPRODUCTIVE: No abnormal bleeding.   VULVA: No pain. No lesions. No itching.  VAGINA: No relaxation. No itching. No odor. No discharge. No lesions.  URINARY: No incontinence. No nocturia. No frequency. No dysuria.    MEDICATIONS AND ALLERGIES:  Reviewed    COMPREHENSIVE GYN HISTORY:  PAP History: Denies abnormal Paps.  Infection History: Denies STDs. Denies PID.  Benign History: Denies uterine fibroids. Denies ovarian cysts. Denies endometriosis. Denies other conditions.  Cancer History: Denies cervical cancer. Denies uterine cancer or hyperplasia. Denies ovarian cancer. Denies vulvar cancer or pre-cancer. Denies vaginal cancer or pre-cancer. Denies breast cancer. Denies colon cancer.  Sexual Activity History: Reports currently being sexually active  Menstrual History: None.  Contraception: None    /64   Ht 5' 6" (1.676 m)   Wt 66.6 kg (146 lb 13.2 oz)   LMP 2019 (Approximate)   BMI 23.70 kg/m²     PE:  AFFECT: Calm, alert and oriented X 3. Interactive during exam FLAT AFFECT  GENERAL: Appears well-nourished, " well-developed, in no acute distress.  HEAD: Normocephalic, atruamatic  TEETH: Good dentition.  THYROID: No thyromegally   BREASTS: No masses, skin changes, nipple discharge or adenopathy bilaterally.  SKIN: Normal for race, warm, & dry. No lesions or rashes.  LUNGS: Easy and unlabored, clear to auscultation bilaterally.  HEART: Regular rate and rhythm   ABDOMEN: Soft and nontender without masses or organomegally.  VULVA: No lesions, masses or tenderness.  VAGINA: Moist and well rugated without lesions or discharge.  CERVIX: Moist and pink without lesions, discharge or tenderness.      UTERUS SIZE: 8 week size, nontender and without masses.  ADNEXA: No masses or tenderness.  ESTIMATE OF PELVIC CAPACITY: Adequate  EXTREMITIES: No cyanosis, clubbing or edema. No calf tenderness.  LYMPH NODES: No axillary or inguinal adenopathy.    PROCEDURES:  UPT Positive  Genprobe  Pap current, negative in 2017    ASSESSMENT/PLAN:  Amenorrhea  Positive urine pregnancy test (DIANE: 2/15/20, EGA: 9w6d based on LMP)    -  Routine prenatal care    Nausea and vomiting in pregnancy    -  Education regarding lifestyle and dietary modifications    -  Advised use of B6/Unisom. Pt will notify us if no relief/worsening symptoms, will consider Zofran if needed.      1st TRIMESTER COUNSELING: Discussed all, booklet provided:  Common complaints of pregnancy  HIV and other routine prenatal tests including  genetic screening  Risk factors identified by prenatal history  Oriented to practice - discussed anticipated course of prenatal care & indications for Ultrasound  Childbirth classes/Hospital facilities   Nutrition and weight gain counseling  Toxoplasmosis precautions (Cats/Raw Meat)  Sexual activity and exercise  Environmental/Work hazards  Travel  Tobacco (Ask, Advise, Assess, Assist, and Arrange), as well as alcohol and drug use  Use of any medications (Including supplements, Vitamins, Herbs, or OTC Drugs)  Domestic violence  Seat belt  use    TERATOLOGY COUNSELING:   Discussed indications and options for aneuploidy screening - pamphlets given    -  Pt desires NT scan    FOLLOW-UP in 4 weeks with Dr. Khan  Labs and u/s today  Pap current  NT ordered    Kim Damon NP    OB/GYN

## 2019-07-20 ENCOUNTER — LAB VISIT (OUTPATIENT)
Dept: LAB | Facility: HOSPITAL | Age: 34
End: 2019-07-20
Payer: COMMERCIAL

## 2019-07-20 DIAGNOSIS — Z32.01 POSITIVE PREGNANCY TEST: ICD-10-CM

## 2019-07-20 DIAGNOSIS — N91.2 AMENORRHEA: ICD-10-CM

## 2019-07-20 LAB
ABO + RH BLD: NORMAL
ANION GAP SERPL CALC-SCNC: 10 MMOL/L (ref 8–16)
BASOPHILS # BLD AUTO: 0.01 K/UL (ref 0–0.2)
BASOPHILS NFR BLD: 0.1 % (ref 0–1.9)
BLD GP AB SCN CELLS X3 SERPL QL: NORMAL
BUN SERPL-MCNC: 6 MG/DL (ref 6–20)
CALCIUM SERPL-MCNC: 9.5 MG/DL (ref 8.7–10.5)
CHLORIDE SERPL-SCNC: 106 MMOL/L (ref 95–110)
CO2 SERPL-SCNC: 21 MMOL/L (ref 23–29)
CREAT SERPL-MCNC: 0.7 MG/DL (ref 0.5–1.4)
DIFFERENTIAL METHOD: ABNORMAL
EOSINOPHIL # BLD AUTO: 0.1 K/UL (ref 0–0.5)
EOSINOPHIL NFR BLD: 0.6 % (ref 0–8)
ERYTHROCYTE [DISTWIDTH] IN BLOOD BY AUTOMATED COUNT: 12.6 % (ref 11.5–14.5)
EST. GFR  (AFRICAN AMERICAN): >60 ML/MIN/1.73 M^2
EST. GFR  (NON AFRICAN AMERICAN): >60 ML/MIN/1.73 M^2
GLUCOSE SERPL-MCNC: 110 MG/DL (ref 70–110)
HCT VFR BLD AUTO: 35.5 % (ref 37–48.5)
HGB BLD-MCNC: 11.8 G/DL (ref 12–16)
LYMPHOCYTES # BLD AUTO: 2.3 K/UL (ref 1–4.8)
LYMPHOCYTES NFR BLD: 30 % (ref 18–48)
MCH RBC QN AUTO: 30.4 PG (ref 27–31)
MCHC RBC AUTO-ENTMCNC: 33.2 G/DL (ref 32–36)
MCV RBC AUTO: 92 FL (ref 82–98)
MONOCYTES # BLD AUTO: 0.4 K/UL (ref 0.3–1)
MONOCYTES NFR BLD: 4.7 % (ref 4–15)
NEUTROPHILS # BLD AUTO: 5 K/UL (ref 1.8–7.7)
NEUTROPHILS NFR BLD: 64.6 % (ref 38–73)
PLATELET # BLD AUTO: 217 K/UL (ref 150–350)
PMV BLD AUTO: 10 FL (ref 9.2–12.9)
POTASSIUM SERPL-SCNC: 4.3 MMOL/L (ref 3.5–5.1)
RBC # BLD AUTO: 3.88 M/UL (ref 4–5.4)
SODIUM SERPL-SCNC: 137 MMOL/L (ref 136–145)
WBC # BLD AUTO: 7.81 K/UL (ref 3.9–12.7)

## 2019-07-20 PROCEDURE — 81220 CFTR GENE COM VARIANTS: CPT

## 2019-07-20 PROCEDURE — 86703 HIV-1/HIV-2 1 RESULT ANTBDY: CPT

## 2019-07-20 PROCEDURE — 86901 BLOOD TYPING SEROLOGIC RH(D): CPT

## 2019-07-20 PROCEDURE — 85025 COMPLETE CBC W/AUTO DIFF WBC: CPT

## 2019-07-20 PROCEDURE — 36415 COLL VENOUS BLD VENIPUNCTURE: CPT

## 2019-07-20 PROCEDURE — 87340 HEPATITIS B SURFACE AG IA: CPT

## 2019-07-20 PROCEDURE — 86592 SYPHILIS TEST NON-TREP QUAL: CPT

## 2019-07-20 PROCEDURE — 80048 BASIC METABOLIC PNL TOTAL CA: CPT

## 2019-07-20 PROCEDURE — 86762 RUBELLA ANTIBODY: CPT

## 2019-07-21 LAB — BACTERIA UR CULT: NORMAL

## 2019-07-22 LAB
HBV SURFACE AG SERPL QL IA: NEGATIVE
HIV 1+2 AB+HIV1 P24 AG SERPL QL IA: NEGATIVE
RPR SER QL: NORMAL
RUBV IGG SER-ACNC: 17.2 IU/ML
RUBV IGG SER-IMP: REACTIVE

## 2019-07-25 LAB — CFTR MUT ANL BLD/T: NORMAL

## 2019-08-06 ENCOUNTER — LAB VISIT (OUTPATIENT)
Dept: LAB | Facility: OTHER | Age: 34
End: 2019-08-06
Attending: OBSTETRICS & GYNECOLOGY
Payer: COMMERCIAL

## 2019-08-06 ENCOUNTER — PROCEDURE VISIT (OUTPATIENT)
Dept: MATERNAL FETAL MEDICINE | Facility: CLINIC | Age: 34
End: 2019-08-06
Payer: COMMERCIAL

## 2019-08-06 VITALS — WEIGHT: 150.81 LBS | BODY MASS INDEX: 24.34 KG/M2

## 2019-08-06 DIAGNOSIS — Z36.82 ENCOUNTER FOR NUCHAL TRANSLUCENCY TESTING: ICD-10-CM

## 2019-08-06 DIAGNOSIS — Z32.01 POSITIVE PREGNANCY TEST: ICD-10-CM

## 2019-08-06 DIAGNOSIS — Z36.89 ENCOUNTER FOR FETAL ANATOMIC SURVEY: ICD-10-CM

## 2019-08-06 PROCEDURE — 76813 PR US, OB NUCHAL, TRANSABDOM/TRANSVAG, FIRST GESTATION: ICD-10-PCS | Mod: S$GLB,,, | Performed by: OBSTETRICS & GYNECOLOGY

## 2019-08-06 PROCEDURE — 81508 FTL CGEN ABNOR TWO PROTEINS: CPT

## 2019-08-06 PROCEDURE — 76813 OB US NUCHAL MEAS 1 GEST: CPT | Mod: S$GLB,,, | Performed by: OBSTETRICS & GYNECOLOGY

## 2019-08-06 PROCEDURE — 36415 COLL VENOUS BLD VENIPUNCTURE: CPT

## 2019-08-06 PROCEDURE — 99499 NO LOS: ICD-10-PCS | Mod: S$GLB,,, | Performed by: OBSTETRICS & GYNECOLOGY

## 2019-08-06 PROCEDURE — 99499 UNLISTED E&M SERVICE: CPT | Mod: S$GLB,,, | Performed by: OBSTETRICS & GYNECOLOGY

## 2019-08-07 LAB
# FETUSES US: NORMAL
AGE AT DELIVERY: 34
B-HCG MOM SERPL: NORMAL
B-HCG SERPL-ACNC: 75 IU/ML
FET CRL US.MEAS: 62.8 MM
FET NASAL BONE LENGTH US.MEAS: NORMAL MM
FET NUCHAL FOLD MOM THICKNESS US.MEAS: NORMAL
FET NUCHAL FOLD THICKNESS US.MEAS: 1.3 MM
FET TS 21 RISK FROM MAT AGE: NORMAL
GA (DAYS): 5 D
GA (WEEKS): 12 WK
IDDM PATIENT QL: NORMAL
INTEGRATED SCN PATIENT-IMP NAR: NORMAL
INTEGRATED SCN PATIENT-IMP: NEGATIVE
PAPP-A MOM SERPL: NORMAL
PAPP-A SERPL-MCNC: NORMAL NG/ML
SMOKING STATUS FTND: NO
TS 18 RISK FETUS: NORMAL
TS 21 RISK FETUS: NORMAL
US DATE: NORMAL

## 2019-08-13 ENCOUNTER — INITIAL PRENATAL (OUTPATIENT)
Dept: OBSTETRICS AND GYNECOLOGY | Facility: CLINIC | Age: 34
End: 2019-08-13
Attending: OBSTETRICS & GYNECOLOGY
Payer: COMMERCIAL

## 2019-08-13 ENCOUNTER — PATIENT MESSAGE (OUTPATIENT)
Dept: ADMINISTRATIVE | Facility: OTHER | Age: 34
End: 2019-08-13

## 2019-08-13 VITALS — BODY MASS INDEX: 24 KG/M2 | WEIGHT: 148.69 LBS | SYSTOLIC BLOOD PRESSURE: 110 MMHG | DIASTOLIC BLOOD PRESSURE: 60 MMHG

## 2019-08-13 DIAGNOSIS — Z98.891 H/O CESAREAN SECTION: Primary | ICD-10-CM

## 2019-08-13 DIAGNOSIS — Z34.90 PREGNANCY WITH ONE FETUS, ANTEPARTUM: ICD-10-CM

## 2019-08-13 PROCEDURE — 0502F SUBSEQUENT PRENATAL CARE: CPT | Mod: CPTII,S$GLB,, | Performed by: OBSTETRICS & GYNECOLOGY

## 2019-08-13 PROCEDURE — 0502F PR SUBSEQUENT PRENATAL CARE: ICD-10-PCS | Mod: CPTII,S$GLB,, | Performed by: OBSTETRICS & GYNECOLOGY

## 2019-08-13 RX ORDER — CLOTRIMAZOLE AND BETAMETHASONE DIPROPIONATE 10; .64 MG/G; MG/G
CREAM TOPICAL 2 TIMES DAILY
COMMUNITY
End: 2019-10-02 | Stop reason: SDUPTHER

## 2019-08-13 NOTE — PROGRESS NOTES
Doing well, discussed possible ABC.  Is definitely interested in TOLAC.  Reports that nausea is much improved after acupuncture, discussed that this is safe to continue throughout.  Discussed NT and sequential I, sequential II after 8/22.    Precautions reinforced, MFM scan ordered.  F/U in 4 weeks, Connect[employer] mom enrollment done today.

## 2019-08-22 ENCOUNTER — TELEPHONE (OUTPATIENT)
Dept: OBSTETRICS AND GYNECOLOGY | Facility: CLINIC | Age: 34
End: 2019-08-22

## 2019-08-22 ENCOUNTER — LAB VISIT (OUTPATIENT)
Dept: LAB | Facility: OTHER | Age: 34
End: 2019-08-22
Attending: OBSTETRICS & GYNECOLOGY
Payer: COMMERCIAL

## 2019-08-22 DIAGNOSIS — Z34.90 PREGNANCY WITH ONE FETUS, ANTEPARTUM: Primary | ICD-10-CM

## 2019-08-22 DIAGNOSIS — O03.4 INCOMPLETE ABORTION: ICD-10-CM

## 2019-08-22 LAB — HCG INTACT+B SERPL-ACNC: NORMAL MIU/ML

## 2019-08-22 PROCEDURE — 84702 CHORIONIC GONADOTROPIN TEST: CPT

## 2019-08-22 PROCEDURE — 36415 COLL VENOUS BLD VENIPUNCTURE: CPT

## 2019-09-16 ENCOUNTER — PATIENT OUTREACH (OUTPATIENT)
Dept: ADMINISTRATIVE | Facility: OTHER | Age: 34
End: 2019-09-16

## 2019-09-17 ENCOUNTER — ROUTINE PRENATAL (OUTPATIENT)
Dept: OBSTETRICS AND GYNECOLOGY | Facility: CLINIC | Age: 34
End: 2019-09-17
Payer: COMMERCIAL

## 2019-09-17 VITALS — SYSTOLIC BLOOD PRESSURE: 121 MMHG | DIASTOLIC BLOOD PRESSURE: 59 MMHG | WEIGHT: 160.5 LBS | BODY MASS INDEX: 25.9 KG/M2

## 2019-09-17 DIAGNOSIS — Z34.90 PREGNANCY WITH ONE FETUS, ANTEPARTUM: ICD-10-CM

## 2019-09-17 DIAGNOSIS — Z98.891 H/O CESAREAN SECTION: Primary | ICD-10-CM

## 2019-09-17 PROCEDURE — 0502F PR SUBSEQUENT PRENATAL CARE: ICD-10-PCS | Mod: CPTII,S$GLB,, | Performed by: OBSTETRICS & GYNECOLOGY

## 2019-09-17 PROCEDURE — 0502F SUBSEQUENT PRENATAL CARE: CPT | Mod: CPTII,S$GLB,, | Performed by: OBSTETRICS & GYNECOLOGY

## 2019-09-17 NOTE — PROGRESS NOTES
Some FM, no LOF, Ctx, VB. Seq screen was negative. Anatomy scheduled for 9/27 wants to find out gender, wants boy. Wanting TOLAC if possible. Precautions given. F/u Dr. Khan.

## 2019-09-27 ENCOUNTER — PROCEDURE VISIT (OUTPATIENT)
Dept: MATERNAL FETAL MEDICINE | Facility: CLINIC | Age: 34
End: 2019-09-27
Attending: OBSTETRICS & GYNECOLOGY
Payer: COMMERCIAL

## 2019-09-27 DIAGNOSIS — Z36.89 ENCOUNTER FOR FETAL ANATOMIC SURVEY: ICD-10-CM

## 2019-09-27 PROCEDURE — 99499 NO LOS: ICD-10-PCS | Mod: S$GLB,,, | Performed by: PEDIATRICS

## 2019-09-27 PROCEDURE — 76805 PR US, OB 14+WKS, TRANSABD, SINGLE GESTATION: ICD-10-PCS | Mod: S$GLB,,, | Performed by: PEDIATRICS

## 2019-09-27 PROCEDURE — 76805 OB US >/= 14 WKS SNGL FETUS: CPT | Mod: S$GLB,,, | Performed by: PEDIATRICS

## 2019-09-27 PROCEDURE — 99499 UNLISTED E&M SERVICE: CPT | Mod: S$GLB,,, | Performed by: PEDIATRICS

## 2019-10-02 ENCOUNTER — PATIENT MESSAGE (OUTPATIENT)
Dept: OBSTETRICS AND GYNECOLOGY | Facility: CLINIC | Age: 34
End: 2019-10-02

## 2019-10-07 RX ORDER — CLOTRIMAZOLE AND BETAMETHASONE DIPROPIONATE 10; .64 MG/G; MG/G
CREAM TOPICAL 2 TIMES DAILY
Qty: 1 TUBE | Refills: 0 | Status: SHIPPED | OUTPATIENT
Start: 2019-10-07 | End: 2019-10-14 | Stop reason: SDUPTHER

## 2019-10-14 ENCOUNTER — ROUTINE PRENATAL (OUTPATIENT)
Dept: OBSTETRICS AND GYNECOLOGY | Facility: CLINIC | Age: 34
End: 2019-10-14
Attending: OBSTETRICS & GYNECOLOGY
Payer: COMMERCIAL

## 2019-10-14 VITALS
SYSTOLIC BLOOD PRESSURE: 110 MMHG | BODY MASS INDEX: 26.87 KG/M2 | WEIGHT: 166.44 LBS | DIASTOLIC BLOOD PRESSURE: 78 MMHG

## 2019-10-14 DIAGNOSIS — Z34.92 PREGNANCY WITH ONE FETUS IN SECOND TRIMESTER: ICD-10-CM

## 2019-10-14 DIAGNOSIS — R21 RASH: Primary | ICD-10-CM

## 2019-10-14 PROCEDURE — 0502F PR SUBSEQUENT PRENATAL CARE: ICD-10-PCS | Mod: CPTII,S$GLB,, | Performed by: OBSTETRICS & GYNECOLOGY

## 2019-10-14 PROCEDURE — 99999 PR PBB SHADOW E&M-EST. PATIENT-LVL II: ICD-10-PCS | Mod: PBBFAC,,, | Performed by: OBSTETRICS & GYNECOLOGY

## 2019-10-14 PROCEDURE — 99999 PR PBB SHADOW E&M-EST. PATIENT-LVL II: CPT | Mod: PBBFAC,,, | Performed by: OBSTETRICS & GYNECOLOGY

## 2019-10-14 PROCEDURE — 0502F SUBSEQUENT PRENATAL CARE: CPT | Mod: CPTII,S$GLB,, | Performed by: OBSTETRICS & GYNECOLOGY

## 2019-10-14 RX ORDER — CLOTRIMAZOLE AND BETAMETHASONE DIPROPIONATE 10; .64 MG/G; MG/G
CREAM TOPICAL 2 TIMES DAILY
Qty: 1 TUBE | Refills: 0 | Status: SHIPPED | OUTPATIENT
Start: 2019-10-14 | End: 2020-04-22 | Stop reason: SDUPTHER

## 2019-10-14 NOTE — PROGRESS NOTES
Patient seen and examined.  No complaints, denies VB/LOF/Ctxns, reports good fetal movement.   Precautions for Bleeding/ ROM/ Decreased fetal movement/ Pre-E reviewed.  Discussed glucose screen coming up.    F/U scheduled 4 weeks

## 2019-11-13 ENCOUNTER — LAB VISIT (OUTPATIENT)
Dept: LAB | Facility: OTHER | Age: 34
End: 2019-11-13
Attending: OBSTETRICS & GYNECOLOGY
Payer: COMMERCIAL

## 2019-11-13 ENCOUNTER — ROUTINE PRENATAL (OUTPATIENT)
Dept: OBSTETRICS AND GYNECOLOGY | Facility: CLINIC | Age: 34
End: 2019-11-13
Attending: OBSTETRICS & GYNECOLOGY
Payer: COMMERCIAL

## 2019-11-13 VITALS
WEIGHT: 174.63 LBS | BODY MASS INDEX: 28.18 KG/M2 | SYSTOLIC BLOOD PRESSURE: 110 MMHG | DIASTOLIC BLOOD PRESSURE: 64 MMHG

## 2019-11-13 DIAGNOSIS — Z34.90 PREGNANCY WITH ONE FETUS, ANTEPARTUM: Primary | ICD-10-CM

## 2019-11-13 DIAGNOSIS — Z98.891 H/O CESAREAN SECTION: ICD-10-CM

## 2019-11-13 DIAGNOSIS — Z34.92 PREGNANCY WITH ONE FETUS IN SECOND TRIMESTER: ICD-10-CM

## 2019-11-13 LAB
BASOPHILS # BLD AUTO: 0.01 K/UL (ref 0–0.2)
BASOPHILS NFR BLD: 0.1 % (ref 0–1.9)
DIFFERENTIAL METHOD: ABNORMAL
EOSINOPHIL # BLD AUTO: 0.1 K/UL (ref 0–0.5)
EOSINOPHIL NFR BLD: 0.8 % (ref 0–8)
ERYTHROCYTE [DISTWIDTH] IN BLOOD BY AUTOMATED COUNT: 12.8 % (ref 11.5–14.5)
GLUCOSE SERPL-MCNC: 109 MG/DL (ref 70–140)
HCT VFR BLD AUTO: 34.7 % (ref 37–48.5)
HGB BLD-MCNC: 11.3 G/DL (ref 12–16)
IMM GRANULOCYTES # BLD AUTO: 0.06 K/UL (ref 0–0.04)
IMM GRANULOCYTES NFR BLD AUTO: 0.7 % (ref 0–0.5)
LYMPHOCYTES # BLD AUTO: 1.8 K/UL (ref 1–4.8)
LYMPHOCYTES NFR BLD: 21.3 % (ref 18–48)
MCH RBC QN AUTO: 30.3 PG (ref 27–31)
MCHC RBC AUTO-ENTMCNC: 32.6 G/DL (ref 32–36)
MCV RBC AUTO: 93 FL (ref 82–98)
MONOCYTES # BLD AUTO: 0.4 K/UL (ref 0.3–1)
MONOCYTES NFR BLD: 5.3 % (ref 4–15)
NEUTROPHILS # BLD AUTO: 6 K/UL (ref 1.8–7.7)
NEUTROPHILS NFR BLD: 71.8 % (ref 38–73)
NRBC BLD-RTO: 0 /100 WBC
PLATELET # BLD AUTO: 212 K/UL (ref 150–350)
PMV BLD AUTO: 10 FL (ref 9.2–12.9)
RBC # BLD AUTO: 3.73 M/UL (ref 4–5.4)
WBC # BLD AUTO: 8.37 K/UL (ref 3.9–12.7)

## 2019-11-13 PROCEDURE — 85025 COMPLETE CBC W/AUTO DIFF WBC: CPT

## 2019-11-13 PROCEDURE — 36415 COLL VENOUS BLD VENIPUNCTURE: CPT

## 2019-11-13 PROCEDURE — 0502F SUBSEQUENT PRENATAL CARE: CPT | Mod: CPTII,S$GLB,, | Performed by: OBSTETRICS & GYNECOLOGY

## 2019-11-13 PROCEDURE — 99999 PR PBB SHADOW E&M-EST. PATIENT-LVL II: CPT | Mod: PBBFAC,,, | Performed by: OBSTETRICS & GYNECOLOGY

## 2019-11-13 PROCEDURE — 0502F PR SUBSEQUENT PRENATAL CARE: ICD-10-PCS | Mod: CPTII,S$GLB,, | Performed by: OBSTETRICS & GYNECOLOGY

## 2019-11-13 PROCEDURE — 99999 PR PBB SHADOW E&M-EST. PATIENT-LVL II: ICD-10-PCS | Mod: PBBFAC,,, | Performed by: OBSTETRICS & GYNECOLOGY

## 2019-11-13 PROCEDURE — 82950 GLUCOSE TEST: CPT

## 2019-12-07 ENCOUNTER — PATIENT MESSAGE (OUTPATIENT)
Dept: OBSTETRICS AND GYNECOLOGY | Facility: CLINIC | Age: 34
End: 2019-12-07

## 2019-12-11 ENCOUNTER — ROUTINE PRENATAL (OUTPATIENT)
Dept: OBSTETRICS AND GYNECOLOGY | Facility: CLINIC | Age: 34
End: 2019-12-11
Attending: OBSTETRICS & GYNECOLOGY
Payer: COMMERCIAL

## 2019-12-11 VITALS
BODY MASS INDEX: 29.46 KG/M2 | DIASTOLIC BLOOD PRESSURE: 72 MMHG | WEIGHT: 182.56 LBS | SYSTOLIC BLOOD PRESSURE: 106 MMHG

## 2019-12-11 PROCEDURE — 0502F SUBSEQUENT PRENATAL CARE: CPT | Mod: CPTII,S$GLB,, | Performed by: OBSTETRICS & GYNECOLOGY

## 2019-12-11 PROCEDURE — 0502F PR SUBSEQUENT PRENATAL CARE: ICD-10-PCS | Mod: CPTII,S$GLB,, | Performed by: OBSTETRICS & GYNECOLOGY

## 2019-12-11 PROCEDURE — 99999 PR PBB SHADOW E&M-EST. PATIENT-LVL III: ICD-10-PCS | Mod: PBBFAC,,, | Performed by: OBSTETRICS & GYNECOLOGY

## 2019-12-11 PROCEDURE — 99999 PR PBB SHADOW E&M-EST. PATIENT-LVL III: CPT | Mod: PBBFAC,,, | Performed by: OBSTETRICS & GYNECOLOGY

## 2019-12-11 NOTE — PROGRESS NOTES
Patient seen and examined.  No complaints, denies VB/LOF/Ctxns, reports good fetal movement. Precautions for Bleeding/ ROM/ Decreased fetal movement/ Pre-E reviewed.  F/U scheduled 4 weeks  Discussed TOLAC.\  Bringing  next visit.

## 2020-01-07 ENCOUNTER — ROUTINE PRENATAL (OUTPATIENT)
Dept: OBSTETRICS AND GYNECOLOGY | Facility: CLINIC | Age: 35
End: 2020-01-07
Attending: OBSTETRICS & GYNECOLOGY
Payer: COMMERCIAL

## 2020-01-07 DIAGNOSIS — Z34.90 PREGNANCY WITH ONE FETUS, ANTEPARTUM: ICD-10-CM

## 2020-01-07 PROCEDURE — 0502F PR SUBSEQUENT PRENATAL CARE: ICD-10-PCS | Mod: CPTII,S$GLB,, | Performed by: OBSTETRICS & GYNECOLOGY

## 2020-01-07 PROCEDURE — 0502F SUBSEQUENT PRENATAL CARE: CPT | Mod: CPTII,S$GLB,, | Performed by: OBSTETRICS & GYNECOLOGY

## 2020-01-08 ENCOUNTER — CLINICAL SUPPORT (OUTPATIENT)
Dept: OBSTETRICS AND GYNECOLOGY | Facility: CLINIC | Age: 35
End: 2020-01-08
Attending: OBSTETRICS & GYNECOLOGY
Payer: COMMERCIAL

## 2020-01-08 ENCOUNTER — OFFICE VISIT (OUTPATIENT)
Dept: ANESTHESIOLOGY | Facility: OTHER | Age: 35
End: 2020-01-08
Attending: OBSTETRICS & GYNECOLOGY
Payer: COMMERCIAL

## 2020-01-08 DIAGNOSIS — Z23 NEED FOR DIPHTHERIA-TETANUS-PERTUSSIS (TDAP) VACCINE: Primary | ICD-10-CM

## 2020-01-08 PROCEDURE — 90471 IMMUNIZATION ADMIN: CPT | Mod: S$GLB,,, | Performed by: OBSTETRICS & GYNECOLOGY

## 2020-01-08 PROCEDURE — 99999 PR PBB SHADOW E&M-EST. PATIENT-LVL I: CPT | Mod: PBBFAC,,,

## 2020-01-08 PROCEDURE — 90715 TDAP VACCINE 7 YRS/> IM: CPT | Mod: S$GLB,,, | Performed by: OBSTETRICS & GYNECOLOGY

## 2020-01-08 PROCEDURE — 90715 TDAP VACCINE GREATER THAN OR EQUAL TO 7YO IM: ICD-10-PCS | Mod: S$GLB,,, | Performed by: OBSTETRICS & GYNECOLOGY

## 2020-01-08 PROCEDURE — 90471 TDAP VACCINE GREATER THAN OR EQUAL TO 7YO IM: ICD-10-PCS | Mod: S$GLB,,, | Performed by: OBSTETRICS & GYNECOLOGY

## 2020-01-08 PROCEDURE — 99999 PR PBB SHADOW E&M-EST. PATIENT-LVL I: ICD-10-PCS | Mod: PBBFAC,,,

## 2020-01-08 NOTE — Clinical Note
January 10, 2020      Josie Khan DO  [address] [facility]  [address]  [facility] [address]           [facility]  [facility] [address]  [address]  [facility] [address]  Phone: [phone]          Patient: Ray Ugarte   MR Number: [id number]   YOB: 1985   Date of Visit: 2020       Dear Dr. Josie Khan:    Thank you for referring Ray Ugarte to me for evaluation. Attached you will find relevant portions of my assessment and plan of care.    If you have questions, please do not hesitate to call me. I look forward to following Ray Ugarte along with you.    Sincerely,    Josie Khan DO    Enclosure  CC:  No Recipients    If you would like to receive this communication electronically, please contact [email or web address] or [phone] to request more information on [employer] Link access.    For providers and/or their staff who would like to refer a patient to [facility], please contact us through our one-stop-shop provider referral line, [facility], at [phone].    If you feel you have received this communication in error or would no longer like to receive these types of communications, please e-mail [email or web address]

## 2020-01-08 NOTE — CONSULTS
Consults   Outpatient OB Anesthesia Consult      Date and Time: 2020 8:51 AM     Request from: Dr. Khan    CC requesting physician or midwife: Dr. Khan    Reason for Consult: Anesthetic recommendations for delivery    Initial Consult?: Yes    Chief Complaint: desires TOLAC with questions about anesthesia    HPI: Patient is a 34 y.o. year old woman, G 2 P 1 presenting with hx of  delivery for failure to progress and desires to have a trial of labor after  with this delivery. She had an epidural with her previous delivery that functioned well for labor and the c/s. She reported having significant pruritis post delivery that lasted a day and kept her from sleeping. She also reported unpleasant shivering during delivery. She had no significant past medical hx or complications with this pregnancy.     Past medical history:    Past Medical History:   Diagnosis Date    Abnormal Pap smear of cervix ?    No procedures necessary. Repeat pap normal       Past surgical history:    Past Surgical History:   Procedure Laterality Date     SECTION  04/24/2015    x1    LASIK         Family history:    Family History   Problem Relation Age of Onset    No Known Problems Mother     Cancer Father 75        Pancreatic    Breast cancer Neg Hx     Colon cancer Neg Hx     Ovarian cancer Neg Hx        Social History:    Social History     Socioeconomic History    Marital status:      Spouse name: Not on file    Number of children: Not on file    Years of education: Not on file    Highest education level: Not on file   Occupational History    Not on file   Social Needs    Financial resource strain: Not on file    Food insecurity:     Worry: Not on file     Inability: Not on file    Transportation needs:     Medical: Not on file     Non-medical: Not on file   Tobacco Use    Smoking status: Never Smoker    Smokeless tobacco: Never Used   Substance and Sexual Activity    Alcohol use: No     Drug use: No    Sexual activity: Yes     Partners: Male     Birth control/protection: None   Lifestyle    Physical activity:     Days per week: Not on file     Minutes per session: Not on file    Stress: Not on file   Relationships    Social connections:     Talks on phone: Not on file     Gets together: Not on file     Attends [employer] service: Not on file     Active member of club or organization: Not on file     Attends meetings of clubs or organizations: Not on file     Relationship status: Not on file   Other Topics Concern    Not on file   Social History Narrative    Works as     Lives with  and daughter       Medication:    Current Outpatient Medications on File Prior to Visit   Medication Sig Dispense Refill    clotrimazole-betamethasone 1-0.05% (LOTRISONE) cream Apply topically 2 (two) times daily. 1 Tube 0    FLUARIX QUAD 3051-1076, PF, 60 mcg (15 mcg x 4)/0.5 mL Syrg ADM 0.5ML IM UTD  0    HYPROMELLOSE (SYSTANE GEL OPHT) Apply 1 drop to eye 2 (two) times daily. Left eye      multivitamin capsule Take 1 capsule by mouth once daily.       No current facility-administered medications on file prior to visit.        Allergies:    Shellfish containing products    Family or personal history of anesthetic complications: No    Diagnostic Studies: I have reviewed the following relevant findings as noted below:  CBC:  Lab Results   Component Value Date    WBC 8.37 11/13/2019    HGB 11.3 (L) 11/13/2019    HCT 34.7 (L) 11/13/2019    MCV 93 11/13/2019     11/13/2019      CMP:  Sodium   Date Value Ref Range Status   07/20/2019 137 136 - 145 mmol/L Final     Potassium   Date Value Ref Range Status   07/20/2019 4.3 3.5 - 5.1 mmol/L Final     Chloride   Date Value Ref Range Status   07/20/2019 106 95 - 110 mmol/L Final     CO2   Date Value Ref Range Status   07/20/2019 21 (L) 23 - 29 mmol/L Final     Glucose   Date Value Ref Range Status   07/20/2019 110 70 - 110 mg/dL Final      BUN, Bld   Date Value Ref Range Status   07/20/2019 6 6 - 20 mg/dL Final     Creatinine   Date Value Ref Range Status   07/20/2019 0.7 0.5 - 1.4 mg/dL Final     Calcium   Date Value Ref Range Status   07/20/2019 9.5 8.7 - 10.5 mg/dL Final     Total Protein   Date Value Ref Range Status   02/25/2019 7.0 6.0 - 8.4 g/dL Final     Albumin   Date Value Ref Range Status   02/25/2019 3.9 3.5 - 5.2 g/dL Final     Total Bilirubin   Date Value Ref Range Status   02/25/2019 0.3 0.1 - 1.0 mg/dL Final     Comment:     For infants and newborns, interpretation of results should be based  on gestational age, weight and in agreement with clinical  observations.  Premature Infant recommended reference ranges:  Up to 24 hours.............<8.0 mg/dL  Up to 48 hours............<12.0 mg/dL  3-5 days..................<15.0 mg/dL  6-29 days.................<15.0 mg/dL       Alkaline Phosphatase   Date Value Ref Range Status   02/25/2019 34 (L) 55 - 135 U/L Final     AST   Date Value Ref Range Status   02/25/2019 14 10 - 40 U/L Final     ALT   Date Value Ref Range Status   02/25/2019 10 10 - 44 U/L Final     Anion Gap   Date Value Ref Range Status   07/20/2019 10 8 - 16 mmol/L Final     eGFR if    Date Value Ref Range Status   07/20/2019 >60 >60 mL/min/1.73 m^2 Final     eGFR if non    Date Value Ref Range Status   07/20/2019 >60 >60 mL/min/1.73 m^2 Final     Comment:     Calculation used to obtain the estimated glomerular filtration  rate (eGFR) is the CKD-EPI equation.        BMP:   Lab Results   Component Value Date     07/20/2019    K 4.3 07/20/2019     07/20/2019    CO2 21 (L) 07/20/2019    BUN 6 07/20/2019    CREATININE 0.7 07/20/2019    CALCIUM 9.5 07/20/2019    ANIONGAP 10 07/20/2019    ESTGFRAFRICA >60 07/20/2019    EGFRNONAA >60 07/20/2019     Coagulation studies:   Lab Results   Component Value Date    INR 1.0 04/24/2015    APTT 33.8 (H) 04/24/2015         Review of Systems:    Constitutional: Negative for fever and chills; well appearing female  Eyes: no visual changes  Ear, nose, mouth and throat: no loose or broken teeth  Cardiovascular: no chest pain  Respiratory: no shortness of breath  Gastrointestinal: no nausea or vomiting  Genitourinary: no dysuria  Musculoskeletal: no joint pain  Skin: warm and dry, no rashes  Neurologic: no seizures  Psychiatric: no anxiety or depression  Endocrinology: no heat or cold intolerance  Hematologic: does not bruise or bleed easily      Physical Exam:  Constitutional: alert, no distress  Eyes: normal lids, pupils symmetric  Ear, nose, mouth and throat: Mallampati I, normal thyromental distance, normal lips, teeth, gums and tongue   Cardiovascular: normal rate, no murmurs  Respiratory: normal effort, no wheezes  Musculoskeletal: normal gait, normal range of motion  Skin: no rashes, no induration  Neurologic: normal reflexes and sensation  Psychiatric: oriented to person, place, time; normal affect    ASA Score: 2    Diagnosis: pregnant with hx of  delivery     Assessment and Plan:  Mrs Ugarte is a healthy  who desires TOLAC with this delivery. She had questions and concerns of her previous side effects with the epidural. I reassured her that these were norm but that we can do our best to decrease the pruritis with this delivery. Last time she had 1.5mg of duramorph via epidural. If she requires  we can either eliminate the duramorph or decrease it significantly. She had questions about nitrous and we discussed that in depth. She is unsure what she will want if any. I let her know that we will see her again when she presents for delivery to answer any further questions she has.     Complexity: Moderate    Entertained and answered all questions to the patient's satisfaction.      Toyin Rogers MD

## 2020-01-08 NOTE — PROGRESS NOTES
Here for TDAP injection. Patient without complaint of pain at this time, Injection given. Tolerated well. No pain noted after injection.  Advised to wait in lobby 15 minutes and report any adverse reactions.     Site: QI    Baby Friendly Handout Given to Patient

## 2020-01-14 ENCOUNTER — PATIENT MESSAGE (OUTPATIENT)
Dept: OBSTETRICS AND GYNECOLOGY | Facility: CLINIC | Age: 35
End: 2020-01-14

## 2020-01-14 ENCOUNTER — ROUTINE PRENATAL (OUTPATIENT)
Dept: OBSTETRICS AND GYNECOLOGY | Facility: CLINIC | Age: 35
End: 2020-01-14
Attending: OBSTETRICS & GYNECOLOGY
Payer: COMMERCIAL

## 2020-01-14 VITALS — WEIGHT: 185.88 LBS | DIASTOLIC BLOOD PRESSURE: 60 MMHG | BODY MASS INDEX: 30 KG/M2 | SYSTOLIC BLOOD PRESSURE: 120 MMHG

## 2020-01-14 DIAGNOSIS — O26.893 PREGNANCY RELATED HIP PAIN IN THIRD TRIMESTER, ANTEPARTUM: Primary | ICD-10-CM

## 2020-01-14 DIAGNOSIS — Z34.93 PREGNANCY WITH ONE FETUS, THIRD TRIMESTER: ICD-10-CM

## 2020-01-14 DIAGNOSIS — M25.559 PREGNANCY RELATED HIP PAIN IN THIRD TRIMESTER, ANTEPARTUM: Primary | ICD-10-CM

## 2020-01-14 PROCEDURE — 0502F SUBSEQUENT PRENATAL CARE: CPT | Mod: CPTII,S$GLB,, | Performed by: OBSTETRICS & GYNECOLOGY

## 2020-01-14 PROCEDURE — 0502F PR SUBSEQUENT PRENATAL CARE: ICD-10-PCS | Mod: CPTII,S$GLB,, | Performed by: OBSTETRICS & GYNECOLOGY

## 2020-01-14 PROCEDURE — 87081 CULTURE SCREEN ONLY: CPT

## 2020-01-14 NOTE — PROGRESS NOTES
Declines cervical check today, GBS performed.  Here with hank, discussed PP expectations and precautions for labor.  Has hip pain and would like to discuss PT, referral placed.  F/U in 1 week.  Precautions reinforced.  Plans TOLAC

## 2020-01-16 LAB — BACTERIA SPEC AEROBE CULT: NORMAL

## 2020-01-22 ENCOUNTER — LAB VISIT (OUTPATIENT)
Dept: LAB | Facility: OTHER | Age: 35
End: 2020-01-22
Attending: OBSTETRICS & GYNECOLOGY
Payer: COMMERCIAL

## 2020-01-22 ENCOUNTER — ROUTINE PRENATAL (OUTPATIENT)
Dept: OBSTETRICS AND GYNECOLOGY | Facility: CLINIC | Age: 35
End: 2020-01-22
Attending: OBSTETRICS & GYNECOLOGY
Payer: COMMERCIAL

## 2020-01-22 VITALS
DIASTOLIC BLOOD PRESSURE: 60 MMHG | WEIGHT: 187.38 LBS | SYSTOLIC BLOOD PRESSURE: 118 MMHG | BODY MASS INDEX: 30.25 KG/M2

## 2020-01-22 DIAGNOSIS — Z3A.36 36 WEEKS GESTATION OF PREGNANCY: Primary | ICD-10-CM

## 2020-01-22 DIAGNOSIS — Z34.93 PREGNANCY WITH ONE FETUS, THIRD TRIMESTER: ICD-10-CM

## 2020-01-22 DIAGNOSIS — J06.9 UPPER RESPIRATORY TRACT INFECTION, UNSPECIFIED TYPE: ICD-10-CM

## 2020-01-22 LAB
BASOPHILS # BLD AUTO: 0.02 K/UL (ref 0–0.2)
BASOPHILS NFR BLD: 0.3 % (ref 0–1.9)
DIFFERENTIAL METHOD: ABNORMAL
EOSINOPHIL # BLD AUTO: 0.1 K/UL (ref 0–0.5)
EOSINOPHIL NFR BLD: 0.9 % (ref 0–8)
ERYTHROCYTE [DISTWIDTH] IN BLOOD BY AUTOMATED COUNT: 13.1 % (ref 11.5–14.5)
HCT VFR BLD AUTO: 39.1 % (ref 37–48.5)
HGB BLD-MCNC: 12.3 G/DL (ref 12–16)
HIV 1+2 AB+HIV1 P24 AG SERPL QL IA: NEGATIVE
IMM GRANULOCYTES # BLD AUTO: 0.07 K/UL (ref 0–0.04)
IMM GRANULOCYTES NFR BLD AUTO: 0.9 % (ref 0–0.5)
LYMPHOCYTES # BLD AUTO: 1.6 K/UL (ref 1–4.8)
LYMPHOCYTES NFR BLD: 19.7 % (ref 18–48)
MCH RBC QN AUTO: 29.2 PG (ref 27–31)
MCHC RBC AUTO-ENTMCNC: 31.5 G/DL (ref 32–36)
MCV RBC AUTO: 93 FL (ref 82–98)
MONOCYTES # BLD AUTO: 0.6 K/UL (ref 0.3–1)
MONOCYTES NFR BLD: 7.8 % (ref 4–15)
NEUTROPHILS # BLD AUTO: 5.6 K/UL (ref 1.8–7.7)
NEUTROPHILS NFR BLD: 70.4 % (ref 38–73)
NRBC BLD-RTO: 0 /100 WBC
PLATELET # BLD AUTO: 192 K/UL (ref 150–350)
PMV BLD AUTO: 10.4 FL (ref 9.2–12.9)
RBC # BLD AUTO: 4.21 M/UL (ref 4–5.4)
WBC # BLD AUTO: 7.96 K/UL (ref 3.9–12.7)

## 2020-01-22 PROCEDURE — 86703 HIV-1/HIV-2 1 RESULT ANTBDY: CPT

## 2020-01-22 PROCEDURE — 99999 PR PBB SHADOW E&M-EST. PATIENT-LVL II: CPT | Mod: PBBFAC,,, | Performed by: NURSE PRACTITIONER

## 2020-01-22 PROCEDURE — 86592 SYPHILIS TEST NON-TREP QUAL: CPT

## 2020-01-22 PROCEDURE — 0502F PR SUBSEQUENT PRENATAL CARE: ICD-10-PCS | Mod: CPTII,S$GLB,, | Performed by: NURSE PRACTITIONER

## 2020-01-22 PROCEDURE — 99999 PR PBB SHADOW E&M-EST. PATIENT-LVL II: ICD-10-PCS | Mod: PBBFAC,,, | Performed by: NURSE PRACTITIONER

## 2020-01-22 PROCEDURE — 36415 COLL VENOUS BLD VENIPUNCTURE: CPT

## 2020-01-22 PROCEDURE — 0502F SUBSEQUENT PRENATAL CARE: CPT | Mod: CPTII,S$GLB,, | Performed by: NURSE PRACTITIONER

## 2020-01-22 PROCEDURE — 85025 COMPLETE CBC W/AUTO DIFF WBC: CPT

## 2020-01-22 NOTE — PROGRESS NOTES
Presents to [facility] at 36w4d gestation for ELENA. Denies VB, CTX, VB, Denies preE symptoms. Nasal congestion/sore throat x 3 days, denies fever/chills, body aches. Excellent FM.     FHT: 151  Fundal: 36 cm  Cervix: 1.5/30/-4  Lung sounds clear bilaterally. VS WNL.    T3 labs scheduled for today. FKC reinforced. Labor/preE symptoms reviewed. OTC cold remedies discussed. FU at [facility] prn. FU with Dr. Khan in 1 week.

## 2020-01-23 LAB — RPR SER QL: NORMAL

## 2020-01-25 ENCOUNTER — PATIENT MESSAGE (OUTPATIENT)
Dept: OBSTETRICS AND GYNECOLOGY | Facility: CLINIC | Age: 35
End: 2020-01-25

## 2020-01-30 ENCOUNTER — ROUTINE PRENATAL (OUTPATIENT)
Dept: OBSTETRICS AND GYNECOLOGY | Facility: CLINIC | Age: 35
End: 2020-01-30
Attending: OBSTETRICS & GYNECOLOGY
Payer: COMMERCIAL

## 2020-01-30 VITALS
SYSTOLIC BLOOD PRESSURE: 114 MMHG | WEIGHT: 184.94 LBS | BODY MASS INDEX: 29.85 KG/M2 | DIASTOLIC BLOOD PRESSURE: 66 MMHG

## 2020-01-30 DIAGNOSIS — Z34.90 PREGNANCY WITH ONE FETUS, ANTEPARTUM: Primary | ICD-10-CM

## 2020-01-30 PROCEDURE — 99999 PR PBB SHADOW E&M-EST. PATIENT-LVL II: ICD-10-PCS | Mod: PBBFAC,,, | Performed by: OBSTETRICS & GYNECOLOGY

## 2020-01-30 PROCEDURE — 99999 PR PBB SHADOW E&M-EST. PATIENT-LVL II: CPT | Mod: PBBFAC,,, | Performed by: OBSTETRICS & GYNECOLOGY

## 2020-01-30 PROCEDURE — 0502F PR SUBSEQUENT PRENATAL CARE: ICD-10-PCS | Mod: CPTII,S$GLB,, | Performed by: OBSTETRICS & GYNECOLOGY

## 2020-01-30 PROCEDURE — 0502F SUBSEQUENT PRENATAL CARE: CPT | Mod: CPTII,S$GLB,, | Performed by: OBSTETRICS & GYNECOLOGY

## 2020-01-31 ENCOUNTER — CLINICAL SUPPORT (OUTPATIENT)
Dept: REHABILITATION | Facility: HOSPITAL | Age: 35
End: 2020-01-31
Attending: OBSTETRICS & GYNECOLOGY
Payer: COMMERCIAL

## 2020-01-31 DIAGNOSIS — R29.898 WEAKNESS OF BOTH HIPS: ICD-10-CM

## 2020-01-31 DIAGNOSIS — O26.893 PREGNANCY RELATED PELVIC PAIN IN THIRD TRIMESTER, ANTEPARTUM: ICD-10-CM

## 2020-01-31 DIAGNOSIS — M25.359 INSTABILITY OF PELVIS OR THIGH JOINT: Primary | ICD-10-CM

## 2020-01-31 DIAGNOSIS — R10.2 PREGNANCY RELATED PELVIC PAIN IN THIRD TRIMESTER, ANTEPARTUM: ICD-10-CM

## 2020-01-31 PROCEDURE — 97161 PT EVAL LOW COMPLEX 20 MIN: CPT | Mod: PO

## 2020-01-31 PROCEDURE — 97110 THERAPEUTIC EXERCISES: CPT | Mod: PO

## 2020-01-31 NOTE — PLAN OF CARE
[facility]  Physical Therapy Initial Evaluation and Discharge Summary    Name: Ray Ugarte  Clinic Number: [id number]    Therapy Diagnosis:   Encounter Diagnoses   Name Primary?   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    Pregnancy related pelvic pain in third trimester, antepartum     Weakness of both hips      Physician: Josie Khan DO    Physician Orders: PT Eval and Treat   Medical Diagnosis from Referral: O26.893,M25.559 (ICD-10-CM) - Pregnancy related hip pain in third trimester, antepartum  Evaluation Date: 2020  Authorization Period Expiration: 2021   Plan of Care Expiration: 2020  Visit # / Visits authorized:     Time In: 0800  Time Out: 0915  Total Billable Time: 60 minutes    Precautions: Standard and pregnant    Subjective   Date of onset: A few weeks ago  History of current condition - Ray reports: Pt report hip pain L worse than R that goes into her lower back. Pt denies numbness/tingling. Pt reports she wakes up at night really stiff. Pt sleeps on both sides throughout the night. Pt states that she has groin pain and clicking in the hip joint. Pt states she can feel her hip shift when she takes a wrong step.     Medical History:   Past Medical History:   Diagnosis Date    Abnormal Pap smear of cervix ?    No procedures necessary. Repeat pap normal       Surgical History:   Ray Ugarte  has a past surgical history that includes LASIK and  section (2015).    Medications:   Ray has a current medication list which includes the following prescription(s): clotrimazole-betamethasone 1-0.05%, fluarix quad [id number] (pf), hypromellose, and multivitamin.    Allergies:   Review of patient's allergies indicates:   Allergen Reactions    Shellfish containing products Nausea And Vomiting      Only mussels        Imaging, none:     Prior Therapy: Hip pain with first pregnancy 5 years ago  Social History: lives with  family in a 2SH with 1 flight of stairs to enter  Occupation:   Prior Level of Function: Exercised on elliptical consistently, indpendent with ADLs, driving, navigate stairs independently  Current Level of Function: unable to exercise or dance     Pain:  Current 1/10, worst 7/10, best 0/10   Location: bilateral hips L worse than R    Description: Aching  Aggravating Factors: prolonged sitting   Easing Factors: stretching, heating pad    Pts goals: Decrease pain    Objective     Observation: Pregnant female in third trimester. Ambulated into clinic without an AD and no significant gait deviations    Posture: forward rounded shoulders    Lumbar ROM: (measured in degrees)    Degrees Quality   Flexion WNL      Extension WNL    Right Side Bending WNL    Left Side Bending WNL    Right rotation WNL    Left Rotation WNL        Active/Passive Hip ROM: (measured in degrees)    RLE LLE   Flexion WNL WNL   Abduction WNL WNL   Extension NT NT   ER WNL WNL   IR WNL WNL       Lower Extremity Strength (graded 0-5 out of 5)   RLE LLE   Hip flexion: 4-/5 3+/5*   Hip ER 4+/5 4/5   Hip IR 5/5 5/5*   Knee extension: 5/5 5/5   Ankle dorsiflexion: 5/5 5/5   Great toe extension: 5/5 5/5   Posterior fibers of Gluteus medius 4/5 4-/5*   Knee flexion tested in sitting 5/5 4+/5   Glute max NT NT   Ankle plantarflexion 5/5 5/5   Hip extension: NT NT       · Flexibility:   · Popliteal Angle: R = 55 degrees ; L =  65 degrees       TREATMENT   Treatment Time In: 0900  Treatment Time Out: 0910  Total Treatment time separate from Evaluation: 10 minutes    Ray received therapeutic exercises to develop strength, ROM, flexibility, posture and core stabilization for 10 minutes including:  · Bridges  · SL clamshells  · hamstring stretch  · piriformis stretch  · MET push pull  · SLR    Home Exercises and Patient Education Provided    Education provided:   - HEP, activity modification/avoidance, body mechanics, SIJ bel    Written Home  Exercises Provided: yes.  Exercises were reviewed and Ray was able to demonstrate them prior to the end of the session.  Ray demonstrated good  understanding of the education provided.     See EMR under Patient Instructions for exercises provided 1/31/2020.    Assessment   Ray is a 34 y.o. female referred to outpatient Physical Therapy with a medical diagnosis of Pregnancy related hip pain third trimester. Pt presents with pelvic instability, decreased hip strength, decreased hamstring muscle length, rotated pelvis/SIJ dysfunction, and groin/hip pain. Pt was given an extensive HEP and educated on posture, body mechanics, shoe wear, and on wearing an SIJ belt to help with pelvis instability. Pt is not a good candidate for PT at this time due to pt being at the end of her third trimester and not wanting PT to induce early labor. Pt is to be discharged from skilled PT services at this time.    Plan of care discussed with patient: Yes  Pt's spiritual, cultural and educational needs considered and patient is agreeable to the plan of care and goals as stated below:     Medical Necessity is demonstrated by the following  History  Co-morbidities and personal factors that may impact the plan of care Co-morbidities:   none    Personal Factors:   none     low   Examination  Body Structures and Functions, activity limitations and participation restrictions that may impact the plan of care Body Regions:   back  lower extremities    Body Systems:    gross symmetry  strength    Participation Restrictions:   unable to exercise and dance    Activity limitations:   Learning and applying knowledge  no deficits    General Tasks and Commands  no deficits    Communication  no deficits    Mobility  no deficits    Self care  no deficits    Domestic Life  no deficits    Interactions/Relationships  no deficits    Life Areas  no deficits    Community and Social Life  no deficits          low   Clinical Presentation stable and uncomplicated  low   Decision Making/ Complexity Score: low         Plan   Plan of care Certification: 1/31/2020 to 01/31/2020.    Plan to discharge pt from PT at this time with HEP and pt educated on posture and body mechanics for pre and post partum.    Digna Barber SPT    I certify that I was present in the room directing the student in service delivery and guiding them using my skilled judgment.  As the co-signing therapist I have reviewed the students documentation and am responsible for the treatment, assessment, and plan.      Ifrah Zurita, PT

## 2020-02-06 ENCOUNTER — ROUTINE PRENATAL (OUTPATIENT)
Dept: OBSTETRICS AND GYNECOLOGY | Facility: CLINIC | Age: 35
End: 2020-02-06
Attending: OBSTETRICS & GYNECOLOGY
Payer: COMMERCIAL

## 2020-02-06 ENCOUNTER — PATIENT MESSAGE (OUTPATIENT)
Dept: OBSTETRICS AND GYNECOLOGY | Facility: CLINIC | Age: 35
End: 2020-02-06

## 2020-02-06 VITALS — DIASTOLIC BLOOD PRESSURE: 62 MMHG | WEIGHT: 185.88 LBS | SYSTOLIC BLOOD PRESSURE: 120 MMHG | BODY MASS INDEX: 30 KG/M2

## 2020-02-06 DIAGNOSIS — Z34.90 PREGNANCY WITH ONE FETUS, ANTEPARTUM: Primary | ICD-10-CM

## 2020-02-06 PROCEDURE — 99999 PR PBB SHADOW E&M-EST. PATIENT-LVL II: CPT | Mod: PBBFAC,,, | Performed by: OBSTETRICS & GYNECOLOGY

## 2020-02-06 PROCEDURE — 0502F PR SUBSEQUENT PRENATAL CARE: ICD-10-PCS | Mod: CPTII,S$GLB,, | Performed by: OBSTETRICS & GYNECOLOGY

## 2020-02-06 PROCEDURE — 0502F SUBSEQUENT PRENATAL CARE: CPT | Mod: CPTII,S$GLB,, | Performed by: OBSTETRICS & GYNECOLOGY

## 2020-02-06 PROCEDURE — 99999 PR PBB SHADOW E&M-EST. PATIENT-LVL II: ICD-10-PCS | Mod: PBBFAC,,, | Performed by: OBSTETRICS & GYNECOLOGY

## 2020-02-06 NOTE — PROGRESS NOTES
Patient seen and examined.  No complaints, denies VB/LOF/Ctxns, reports good fetal movement. Precautions for Bleeding/ ROM/ Decreased fetal movement/ Pre-E reviewed.  Will send birth preferences  F/U scheduled 1 weeks

## 2020-02-13 ENCOUNTER — ANESTHESIA EVENT (OUTPATIENT)
Dept: OBSTETRICS AND GYNECOLOGY | Facility: OTHER | Age: 35
End: 2020-02-13
Payer: COMMERCIAL

## 2020-02-13 ENCOUNTER — HOSPITAL ENCOUNTER (INPATIENT)
Facility: OTHER | Age: 35
LOS: 3 days | Discharge: HOME | End: 2020-02-16
Attending: OBSTETRICS & GYNECOLOGY | Admitting: OBSTETRICS & GYNECOLOGY
Payer: COMMERCIAL

## 2020-02-13 ENCOUNTER — ANESTHESIA (OUTPATIENT)
Dept: OBSTETRICS AND GYNECOLOGY | Facility: OTHER | Age: 35
End: 2020-02-13
Payer: COMMERCIAL

## 2020-02-13 DIAGNOSIS — Z3A.39 39 WEEKS GESTATION OF PREGNANCY: ICD-10-CM

## 2020-02-13 DIAGNOSIS — Z98.891 H/O CESAREAN SECTION: ICD-10-CM

## 2020-02-13 DIAGNOSIS — O47.9 IRREGULAR CONTRACTIONS: ICD-10-CM

## 2020-02-13 DIAGNOSIS — O34.219 VBAC, DELIVERED: Primary | ICD-10-CM

## 2020-02-13 LAB
ABO + RH BLD: NORMAL
ALBUMIN SERPL BCP-MCNC: 1.9 G/DL (ref 3.5–5.2)
ALP SERPL-CCNC: 92 U/L (ref 55–135)
ALT SERPL W/O P-5'-P-CCNC: 5 U/L (ref 10–44)
ANION GAP SERPL CALC-SCNC: 12 MMOL/L (ref 8–16)
AST SERPL-CCNC: 8 U/L (ref 10–40)
BASOPHILS # BLD AUTO: 0.02 K/UL (ref 0–0.2)
BASOPHILS NFR BLD: 0.1 % (ref 0–1.9)
BILIRUB SERPL-MCNC: 0.2 MG/DL (ref 0.1–1)
BLD GP AB SCN CELLS X3 SERPL QL: NORMAL
BUN SERPL-MCNC: 4 MG/DL (ref 6–20)
CALCIUM SERPL-MCNC: 7.5 MG/DL (ref 8.7–10.5)
CHLORIDE SERPL-SCNC: 106 MMOL/L (ref 95–110)
CO2 SERPL-SCNC: 17 MMOL/L (ref 23–29)
CREAT SERPL-MCNC: 0.5 MG/DL (ref 0.5–1.4)
CREAT UR-MCNC: 42.3 MG/DL (ref 15–325)
DIFFERENTIAL METHOD: ABNORMAL
EOSINOPHIL # BLD AUTO: 0 K/UL (ref 0–0.5)
EOSINOPHIL NFR BLD: 0.1 % (ref 0–8)
ERYTHROCYTE [DISTWIDTH] IN BLOOD BY AUTOMATED COUNT: 12.7 % (ref 11.5–14.5)
EST. GFR  (AFRICAN AMERICAN): >60 ML/MIN/1.73 M^2
EST. GFR  (NON AFRICAN AMERICAN): >60 ML/MIN/1.73 M^2
GLUCOSE SERPL-MCNC: 77 MG/DL (ref 70–110)
HCT VFR BLD AUTO: 37.9 % (ref 37–48.5)
HGB BLD-MCNC: 12.7 G/DL (ref 12–16)
IMM GRANULOCYTES # BLD AUTO: 0.08 K/UL (ref 0–0.04)
IMM GRANULOCYTES NFR BLD AUTO: 0.5 % (ref 0–0.5)
LYMPHOCYTES # BLD AUTO: 1.3 K/UL (ref 1–4.8)
LYMPHOCYTES NFR BLD: 8.5 % (ref 18–48)
MCH RBC QN AUTO: 29.4 PG (ref 27–31)
MCHC RBC AUTO-ENTMCNC: 33.5 G/DL (ref 32–36)
MCV RBC AUTO: 88 FL (ref 82–98)
MONOCYTES # BLD AUTO: 0.6 K/UL (ref 0.3–1)
MONOCYTES NFR BLD: 4 % (ref 4–15)
NEUTROPHILS # BLD AUTO: 13.6 K/UL (ref 1.8–7.7)
NEUTROPHILS NFR BLD: 86.8 % (ref 38–73)
NRBC BLD-RTO: 0 /100 WBC
PLATELET # BLD AUTO: 225 K/UL (ref 150–350)
PMV BLD AUTO: 10 FL (ref 9.2–12.9)
POTASSIUM SERPL-SCNC: 4.3 MMOL/L (ref 3.5–5.1)
PROT SERPL-MCNC: 4.6 G/DL (ref 6–8.4)
PROT UR-MCNC: 12 MG/DL (ref 0–15)
PROT/CREAT UR: 0.28 MG/G{CREAT} (ref 0–0.2)
RBC # BLD AUTO: 4.32 M/UL (ref 4–5.4)
SODIUM SERPL-SCNC: 135 MMOL/L (ref 136–145)
WBC # BLD AUTO: 15.63 K/UL (ref 3.9–12.7)

## 2020-02-13 PROCEDURE — 59409 OBSTETRICAL CARE: CPT | Mod: QY,,, | Performed by: ANESTHESIOLOGY

## 2020-02-13 PROCEDURE — 11000001 HC ACUTE MED/SURG PRIVATE ROOM

## 2020-02-13 PROCEDURE — 63600175 PHARM REV CODE 636 W HCPCS: Performed by: STUDENT IN AN ORGANIZED HEALTH CARE EDUCATION/TRAINING PROGRAM

## 2020-02-13 PROCEDURE — 59025 FETAL NON-STRESS TEST: CPT

## 2020-02-13 PROCEDURE — 27200710 HC EPIDURAL INFUSION PUMP SET: Performed by: ANESTHESIOLOGY

## 2020-02-13 PROCEDURE — 99285 EMERGENCY DEPT VISIT HI MDM: CPT | Mod: 25

## 2020-02-13 PROCEDURE — 25000003 PHARM REV CODE 250: Performed by: STUDENT IN AN ORGANIZED HEALTH CARE EDUCATION/TRAINING PROGRAM

## 2020-02-13 PROCEDURE — 99283 EMERGENCY DEPT VISIT LOW MDM: CPT | Mod: 25,,, | Performed by: OBSTETRICS & GYNECOLOGY

## 2020-02-13 PROCEDURE — 72100002 HC LABOR CARE, 1ST 8 HOURS

## 2020-02-13 PROCEDURE — 36415 COLL VENOUS BLD VENIPUNCTURE: CPT

## 2020-02-13 PROCEDURE — 85025 COMPLETE CBC W/AUTO DIFF WBC: CPT

## 2020-02-13 PROCEDURE — 62326 NJX INTERLAMINAR LMBR/SAC: CPT | Performed by: STUDENT IN AN ORGANIZED HEALTH CARE EDUCATION/TRAINING PROGRAM

## 2020-02-13 PROCEDURE — 80053 COMPREHEN METABOLIC PANEL: CPT

## 2020-02-13 PROCEDURE — 51702 INSERT TEMP BLADDER CATH: CPT

## 2020-02-13 PROCEDURE — 99283 PR EMERGENCY DEPT VISIT,LEVEL III: ICD-10-PCS | Mod: 25,,, | Performed by: OBSTETRICS & GYNECOLOGY

## 2020-02-13 PROCEDURE — 84156 ASSAY OF PROTEIN URINE: CPT

## 2020-02-13 PROCEDURE — 86900 BLOOD TYPING SEROLOGIC ABO: CPT

## 2020-02-13 PROCEDURE — C1751 CATH, INF, PER/CENT/MIDLINE: HCPCS | Performed by: ANESTHESIOLOGY

## 2020-02-13 PROCEDURE — 59409 PRA ETRICAL CARE,VAG DELIV ONLY: ICD-10-PCS | Mod: QY,,, | Performed by: ANESTHESIOLOGY

## 2020-02-13 PROCEDURE — 59025 FETAL NON-STRESS TEST: CPT | Mod: 26,,, | Performed by: OBSTETRICS & GYNECOLOGY

## 2020-02-13 PROCEDURE — 59025 PR FETAL 2N-STRESS TEST: ICD-10-PCS | Mod: 26,,, | Performed by: OBSTETRICS & GYNECOLOGY

## 2020-02-13 RX ORDER — OXYTOCIN/RINGER'S LACTATE 30/500 ML
334 PLASTIC BAG, INJECTION (ML) INTRAVENOUS ONCE
Status: DISCONTINUED | OUTPATIENT
Start: 2020-02-13 | End: 2020-02-14

## 2020-02-13 RX ORDER — SODIUM CHLORIDE 9 MG/ML
INJECTION, SOLUTION INTRAVENOUS CONTINUOUS
Status: DISCONTINUED | OUTPATIENT
Start: 2020-02-13 | End: 2020-02-13

## 2020-02-13 RX ORDER — FAMOTIDINE 10 MG/ML
20 INJECTION INTRAVENOUS ONCE
Status: DISCONTINUED | OUTPATIENT
Start: 2020-02-13 | End: 2020-02-14

## 2020-02-13 RX ORDER — FENTANYL/BUPIVACAINE/NS/PF 2MCG/ML-.1
PLASTIC BAG, INJECTION (ML) INJECTION
Status: DISPENSED
Start: 2020-02-13 | End: 2020-02-14

## 2020-02-13 RX ORDER — CALCIUM CARBONATE 200(500)MG
500 TABLET,CHEWABLE ORAL 3 TIMES DAILY PRN
Status: DISCONTINUED | OUTPATIENT
Start: 2020-02-13 | End: 2020-02-14

## 2020-02-13 RX ORDER — OXYTOCIN/RINGER'S LACTATE 30/500 ML
95 PLASTIC BAG, INJECTION (ML) INTRAVENOUS ONCE
Status: DISCONTINUED | OUTPATIENT
Start: 2020-02-13 | End: 2020-02-14

## 2020-02-13 RX ORDER — ACETAMINOPHEN 500 MG
1000 TABLET ORAL ONCE
Status: COMPLETED | OUTPATIENT
Start: 2020-02-13 | End: 2020-02-13

## 2020-02-13 RX ORDER — FENTANYL/BUPIVACAINE/NS/PF 2MCG/ML-.1
PLASTIC BAG, INJECTION (ML) INJECTION CONTINUOUS
Status: DISCONTINUED | OUTPATIENT
Start: 2020-02-13 | End: 2020-02-14

## 2020-02-13 RX ORDER — BUPIVACAINE HYDROCHLORIDE 2.5 MG/ML
INJECTION, SOLUTION EPIDURAL; INFILTRATION; INTRACAUDAL
Status: DISPENSED
Start: 2020-02-13 | End: 2020-02-14

## 2020-02-13 RX ORDER — ACETAMINOPHEN 325 MG/1
650 TABLET ORAL ONCE
Status: DISCONTINUED | OUTPATIENT
Start: 2020-02-13 | End: 2020-02-13

## 2020-02-13 RX ORDER — FENTANYL/BUPIVACAINE/NS/PF 2MCG/ML-.1
PLASTIC BAG, INJECTION (ML) INJECTION CONTINUOUS PRN
Status: DISCONTINUED | OUTPATIENT
Start: 2020-02-13 | End: 2020-02-14

## 2020-02-13 RX ORDER — ONDANSETRON 8 MG/1
8 TABLET, ORALLY DISINTEGRATING ORAL EVERY 8 HOURS PRN
Status: DISCONTINUED | OUTPATIENT
Start: 2020-02-13 | End: 2020-02-14

## 2020-02-13 RX ORDER — SODIUM CHLORIDE 9 MG/ML
INJECTION, SOLUTION INTRAVENOUS
Status: DISCONTINUED | OUTPATIENT
Start: 2020-02-13 | End: 2020-02-14

## 2020-02-13 RX ORDER — OXYTOCIN/RINGER'S LACTATE 30/500 ML
2 PLASTIC BAG, INJECTION (ML) INTRAVENOUS CONTINUOUS
Status: DISCONTINUED | OUTPATIENT
Start: 2020-02-13 | End: 2020-02-14

## 2020-02-13 RX ORDER — LIDOCAINE HYDROCHLORIDE AND EPINEPHRINE 15; 5 MG/ML; UG/ML
INJECTION, SOLUTION EPIDURAL
Status: DISCONTINUED | OUTPATIENT
Start: 2020-02-13 | End: 2020-02-14

## 2020-02-13 RX ORDER — SODIUM CITRATE AND CITRIC ACID MONOHYDRATE 334; 500 MG/5ML; MG/5ML
30 SOLUTION ORAL ONCE
Status: DISCONTINUED | OUTPATIENT
Start: 2020-02-13 | End: 2020-02-14

## 2020-02-13 RX ORDER — SIMETHICONE 80 MG
1 TABLET,CHEWABLE ORAL 4 TIMES DAILY PRN
Status: DISCONTINUED | OUTPATIENT
Start: 2020-02-13 | End: 2020-02-14

## 2020-02-13 RX ADMIN — LIDOCAINE HYDROCHLORIDE,EPINEPHRINE BITARTRATE 3 ML: 15; .005 INJECTION, SOLUTION EPIDURAL; INFILTRATION; INTRACAUDAL; PERINEURAL at 01:02

## 2020-02-13 RX ADMIN — Medication 2 MILLI-UNITS/MIN: at 03:02

## 2020-02-13 RX ADMIN — CALCIUM CARBONATE (ANTACID) CHEW TAB 500 MG 500 MG: 500 CHEW TAB at 11:02

## 2020-02-13 RX ADMIN — Medication 10 ML: at 04:02

## 2020-02-13 RX ADMIN — Medication 5 ML/HR: at 01:02

## 2020-02-13 RX ADMIN — ACETAMINOPHEN 1000 MG: 500 TABLET ORAL at 09:02

## 2020-02-13 NOTE — H&P
HISTORY AND PHYSICAL                                                OBSTETRICS          Subjective:      [facility]   Ray Ugarte is a 34 y.o. [id number] at 39w5d presents complaining of contractions. She reports they have been increasing in intensity and frequency since this morning at 0200. Also reports intermittent spots of fluid at times. Patient was evaluated in the [facility] and found to be 2 cm; sterile speculum exam was performed which showed negative pooling, ferning, nitrazine. She elected to have a 2 hour rule out due to significant amount of pain and was 3.5 cm after recheck. Due to cervical change, patient will be admitted to the L&D unit for normal labor.      This IUP by history of CS x1 (arrest of dilation at 5 cm, on setting of chorio - complicated by PPH 2L), plans to TOLAC.  Patient reports contractions, denies vaginal bleeding, denies LOF.   Fetal Movement: normal.     Review of Systems   Constitutional: Negative for chills, diaphoresis and fever.   HENT: Negative for congestion, sinus pain and sore throat.    Eyes: Negative for double vision and photophobia.   Respiratory: Negative for cough, hemoptysis and shortness of breath.    Cardiovascular: Negative for chest pain, palpitations, claudication and leg swelling.   Gastrointestinal: Negative for abdominal pain, diarrhea, nausea and vomiting.   Genitourinary: Negative for dysuria, frequency and hematuria.   Musculoskeletal: Negative for joint pain, myalgias and neck pain.   Neurological: Negative for tingling, speech change, focal weakness and headaches.   Psychiatric/Behavioral: Negative for depression, hallucinations, substance abuse and suicidal ideas.     PMHx:   Past Medical History:   Diagnosis Date    Abnormal Pap smear of cervix ?    No procedures necessary. Repeat pap normal       PSHx:   Past Surgical History:   Procedure Laterality Date     SECTION  04/24/2015    x1    LASIK         All:   Review of patient's allergies  indicates:   Allergen Reactions    Shellfish containing products Nausea And Vomiting      Only mussels       Meds:   (Not in a hospital admission)    SH:   Social History     Socioeconomic History    Marital status:      Spouse name: Not on file    Number of children: Not on file    Years of education: Not on file    Highest education level: Not on file   Occupational History    Not on file   Social Needs    Financial resource strain: Not on file    Food insecurity:     Worry: Not on file     Inability: Not on file    Transportation needs:     Medical: Not on file     Non-medical: Not on file   Tobacco Use    Smoking status: Never Smoker    Smokeless tobacco: Never Used   Substance and Sexual Activity    Alcohol use: No    Drug use: No    Sexual activity: Yes     Partners: Male     Birth control/protection: None   Lifestyle    Physical activity:     Days per week: Not on file     Minutes per session: Not on file    Stress: Not on file   Relationships    Social connections:     Talks on phone: Not on file     Gets together: Not on file     Attends [employer] service: Not on file     Active member of club or organization: Not on file     Attends meetings of clubs or organizations: Not on file     Relationship status: Not on file   Other Topics Concern    Not on file   Social History Narrative    Works as     Lives with  and daughter       FH:   Family History   Problem Relation Age of Onset    No Known Problems Mother     Cancer Father 75        Pancreatic    Breast cancer Neg Hx     Colon cancer Neg Hx     Ovarian cancer Neg Hx        OBHx:   OB History    Para Term  AB Living   3 1 1 0 1 1   SAB TAB Ectopic Multiple Live Births   1 0 0 0 1      # Outcome Date GA Lbr Bubba/2nd Weight Sex Delivery Anes PTL Lv   3 Current            2 2018     SAB      1 Term 04/24/15 41w1d  4.054 kg (8 lb 15 oz) F CS-LTranv EPI N JCARLOS      Apgar1: 6  Apgar5: 9  "      Objective:       /81 (BP Location: Left arm, Patient Position: Lying)   Pulse 86   Temp 98.5 °F (36.9 °C) (Tympanic)   Resp 20   Ht 5' 6" (1.676 m)   LMP 05/11/2019 (Approximate)   SpO2 98%   Breastfeeding? No   BMI 30.00 kg/m²     Vitals:    02/13/20 0821   BP: 126/81   BP Location: Left arm   Patient Position: Lying   Pulse: 86   Resp: 20   Temp: 98.5 °F (36.9 °C)   TempSrc: Tympanic   SpO2: 98%   Height: 5' 6" (1.676 m)       General:   alert, appears stated age and cooperative   Lungs:   clear to auscultation bilaterally   Heart:   regular rate and rhythm, S1, S2 normal, no murmur, click, rub or gallop   Abdomen:  soft, non-tender; bowel sounds normal; no masses,  no organomegaly   Extremities negative edema, negative erythema   FHT: 140, +accels, no decels, moderate btbv, Cat 1 (reassuring)                 TOCO: Irregular   Presentations: cephalic by ultrasound   Cervix:     Dilation: 3.5 cm     Effacement: 90%    Station:  -1    Consistency: soft    Position: middle     Lab Review  Blood Type A POS  GBBS: negative  Rubella: Immune  RPR: NR  HIV: negative  HepB: negative     Assessment:       39w5d weeks gestation who presents for normal labor/TOLAC    There are no hospital problems to display for this patient.         Plan:   1. Normal labor/TOLAC   Risks, benefits, alternatives and possible complications have been discussed in detail with the patient.   - Consents signed and to chart  - Admit to Labor and Delivery unit  - US: vertex position verified  - GBS: negative, no ppx necessary  - Currently, patient hurting badly and making some cervical change > will not start labor augmentation however if/when necessary consider pitocin (history of CS x1) and AROM   - Epidural per Anesthesia  - Draw CBC, T&S  - Notify Staff  - Recheck in 2-4 hrs or PRN    2. History of post partum hemorrhage  - Per last operative note, patient had CS given arrest of dilation at 5 cm on setting of chorio  - " Resulting post partum hemorrhage of approx 2 L  - Most recent H/h: 12/39 as of 01/2019  - Monitor closely     Post-Partum Hemorrhage risk - medium          Dona Staton MD  OB/GYN  PGY-2

## 2020-02-13 NOTE — ED PROVIDER NOTES
Encounter Date: 2020       History   No chief complaint on file.    HPI   Ray Ugarte is a 34 y.o. [id number] at 39w5d presents complaining of contractions. She reports they have been increasing in intensity and frequency since this morning at 0200. Also reports intermittent spots of fluid at times.     This IUP by history of CS x1 (arrest of dilation at 5 cm, on setting of chorio - complicated by PPH 2L), plans to TOLAC.  Patient reports contractions, denies vaginal bleeding, denies LOF.   Fetal Movement: normal.     Review of patient's allergies indicates:   Allergen Reactions    Shellfish containing products Nausea And Vomiting      Only mussels     Past Medical History:   Diagnosis Date    Abnormal Pap smear of cervix ?    No procedures necessary. Repeat pap normal     Past Surgical History:   Procedure Laterality Date     SECTION  04/24/2015    x1    LASIK       Family History   Problem Relation Age of Onset    No Known Problems Mother     Cancer Father 75        Pancreatic    Breast cancer Neg Hx     Colon cancer Neg Hx     Ovarian cancer Neg Hx      Social History     Tobacco Use    Smoking status: Never Smoker    Smokeless tobacco: Never Used   Substance Use Topics    Alcohol use: No    Drug use: No     Review of Systems   Constitutional: Negative for chills, diaphoresis and fever.   HENT: Negative for congestion, postnasal drip, rhinorrhea, sneezing and sore throat.    Eyes: Negative for photophobia.   Respiratory: Negative for cough, chest tightness and shortness of breath.    Cardiovascular: Negative for chest pain, palpitations and leg swelling.   Gastrointestinal: Negative for abdominal pain, constipation, diarrhea, nausea and vomiting.   Genitourinary: Positive for pelvic pain and vaginal discharge. Negative for dysuria, frequency, hematuria and vaginal bleeding.   Musculoskeletal: Negative for arthralgias, back pain and joint swelling.   Skin: Negative for rash.    Neurological: Negative for syncope, light-headedness and headaches.   Psychiatric/Behavioral: Negative for self-injury, sleep disturbance and suicidal ideas. The patient is not nervous/anxious.        Physical Exam     Initial Vitals [02/13/20 0821]   BP Pulse Resp Temp SpO2   126/81 86 20 98.5 °F (36.9 °C) 98 %      MAP       --         Physical Exam    Vitals reviewed.  Constitutional: Vital signs are normal. She appears well-developed and well-nourished. She is not diaphoretic. She appears distressed.   Cardiovascular: Normal rate, regular rhythm and normal heart sounds.   Pulmonary/Chest: No respiratory distress.   Abdominal: Soft. There is no tenderness. There is no rebound and no guarding.   Gravid abdomen, size = dates   Genitourinary: Uterus normal.   Musculoskeletal: She exhibits no edema or tenderness.   Neurological: She is alert and oriented to person, place, and time. She has normal reflexes. No cranial nerve deficit.   Skin: Skin is warm and dry. Capillary refill takes less than 2 seconds.   Psychiatric: She has a normal mood and affect. Her behavior is normal. Judgment and thought content normal.     OB LABOR EXAM:   Pre-Term Labor: No.   Membranes ruptured: No.   Method: Sterile vaginal exam per MD and Sterile speculum exam per MD.   Vaginal Bleeding: none present.     Dilatation: 2.   Station: -2.   Effacement: 80%.   Amniotic Fluid Color: no fluid.   Amniotic Fluid Amount: none noted.   Comments: Negative ferning, nitrazine, pooling  SVE: 2/80/-2 > recheck in 2 hour: 3.5/90/-2       ED Course   Fetal non-stress test  Date/Time: 2/13/2020 10:41 AM  Performed by: Mercy Welch MD  Authorized by: Josie Khan DO     Nonstress Test:     Variability:  6-25 BPM    Decelerations:  None    Accelerations:  15 bpm    Baseline:  140    Contractions:  Regular    Contraction Frequency:  Every 3-4 minutes  Biophysical Profile:     Nonstress Test Interpretation: reactive      Overall Impression:   Reassuring      Labs Reviewed - No data to display       Imaging Results    None          Medical Decision Making:   History:   Old Records Summarized: records from clinic visits and records from previous admission(s).  ED Management:  Patient presenting with contractions, increasing in intensity and frequency since 0200  VSS, hunched over in bed in pain  History of CS x1, desires TOLAC  NST: 145, reactive/reassuring. CTX: q2-4  SVE: 2/80/-3 > recheck after 2 hours 3.5/90/-1  Will admit patient to L&D for normal labor. Consents signed, questions answered. US:Vertex position verified. Primary OBGYN notified.               Attending Attestation:   Physician Attestation Statement for Resident:  As the supervising MD   Physician Attestation Statement: I have personally seen and examined this patient.   I agree with the above history. -:   As the supervising MD I agree with the above PE.    As the supervising MD I agree with the above treatment, course, plan, and disposition.   -: Patient evaluated and found to be stable, agree with resident's assessment and plan.  I was personally present during the critical portions of the procedure(s) performed by the resident and was immediately available in the ED to provide services and assistance as needed during the entire procedure.  I have reviewed the following: old records at this facility.                    ED Course as of  1105   Thu 2020   0830 Tracing reassuring, previous c/s, desires TOLAC    [AR]   1041 SVE (recheck) 3.590/-2    [AR]      ED Course User Index  [AR] Mercy Welch MD                Clinical Impression:       ICD-10-CM ICD-9-CM   1. Irregular contractions O62.2 661.20   2. 39 weeks gestation of pregnancy Z3A.39 V22.2   3. H/O  section Z98.891 V45.89         Disposition:   Disposition: Admitted  Condition: Stable                     Dona Staton MD  Resident  20 1119       Mercy Welch MD  20 0718

## 2020-02-13 NOTE — ED NOTES
Pt presents to [facility] c/o regular contractions that began this morning at 0230 and loss of clear fluid noticed at 0300. Dr. Staton notified of pt's arrival.

## 2020-02-13 NOTE — PROGRESS NOTES
LABOR PROGRESS NOTE    S:  MD to bedside for cervical check. Complaints: None.      O: Temp:  [98 °F (36.7 °C)-98.5 °F (36.9 °C)] 98 °F (36.7 °C)  Pulse:  [] 81  Resp:  [16-20] 18  SpO2:  [96 %-99 %] 96 %  BP: (117-141)/(54-81) 117/54    FHT: 135 BPM/moderate beat to beat variability/+accels/-decels   CTX: q 5-8 minutes; irritibility     Dilation (cm): 3.5  Effacement (%): 90  Station: -1  Dose(units) Oxytocin: *2 fang-units/min     ASSESSMENT:   34 y.o.  at 39w5d; relatively unchanged from previous check  FHT reassuring  Cat 1  Discussed extensively the use of pitocin and role of AROM in labor      TIMELINE  1045: 3.5/90/-1  1230: 3.5/80/-2  1445: 3.5/90/-1; AROM and starting Pit.     PLAN:  1. Labor management  -Continue Close Maternal/Fetal Monitoring.   -Pitocin augmentation per protocol,   -Recheck 2-4 hours or PRN    SHANNON Cruz MD  OBGYN PGY1

## 2020-02-13 NOTE — PROGRESS NOTES
"LABOR NOTE    Resident to bedside for routine cervical check.    S:  Complaints: Yes - pain with contractions. Requesting epidural.    O: /76   Pulse 77   Temp 98.2 °F (36.8 °C) (Temporal)   Resp 16   Ht 5' 6" (1.676 m)   LMP 2019 (Approximate)   SpO2 98%   Breastfeeding? No   BMI 30.00 kg/m²       FHT: Cat 1 (reassuring), baseline 125, mod BTBV, + accels, - decels  CTX: q 4-5 minutes  SVE: 3./-2      ASSESSMENT:   34 y.o.  at 39w5d, normal labor    FHT reassuring    Active Hospital Problems    Diagnosis  POA    Irregular contractions [O62.2]  Yes      Resolved Hospital Problems   No resolved problems to display.       TIMELINE:  1045: 3./-1  1230: 3./-2    PLAN:  Continue Close Maternal/Fetal Monitoring  Pitocin Augmentation per protocol - patient declines at this time  AROM next check if patient agreeable  Recheck 2 hours or PRN    Wanda Hayes M.D.  OB/GYN PGY-1  "

## 2020-02-13 NOTE — ANESTHESIA PREPROCEDURE EVALUATION
[facility]  Anesthesia Pre-Operative Evaluation         Patient Name: Ray Ugarte  YOB: 1985  MRN: [id number]    2020      Ray Ugarte is a 34 y.o. female  @ 39w5d who presents for TOLAC. She notes cramping since yesterday with cervical change 2 -> 3.5 cm in [facility]. Denies vaginal bleeding or LOF. Prior CS for arrest of fetal descent in setting of chrio, with 2L of blood loss during procedure.    OB History    Para Term  AB Living   3 1 1   1 1   SAB TAB Ectopic Multiple Live Births   1     0 1      # Outcome Date GA Lbr Bubba/2nd Weight Sex Delivery Anes PTL Lv   3 Current            2 2018     SAB      1 Term 04/24/15 41w1d  4.054 kg (8 lb 15 oz) F CS-LTranv EPI N JCARLOS       Review of patient's allergies indicates:   Allergen Reactions    Shellfish containing products Nausea And Vomiting      Only mussels       Wt Readings from Last 1 Encounters:   20 0843 84.3 kg (185 lb 13.6 oz)       BP Readings from Last 3 Encounters:   20 126/81   20 120/62   20 114/66       Patient Active Problem List   Diagnosis    H/O  section    Pregnancy with one fetus, antepartum    Instability of pelvis or thigh joint    Pregnancy related pelvic pain in third trimester, antepartum    Weakness of both hips    Irregular contractions       Past Surgical History:   Procedure Laterality Date     SECTION  04/24/2015    x1    LASIK         Social History     Socioeconomic History    Marital status:      Spouse name: Not on file    Number of children: Not on file    Years of education: Not on file    Highest education level: Not on file   Occupational History    Not on file   Social Needs    Financial resource strain: Not on file    Food insecurity:     Worry: Not on file     Inability: Not on file    Transportation needs:     Medical: Not on file     Non-medical: Not on file   Tobacco Use    Smoking  status: Never Smoker    Smokeless tobacco: Never Used   Substance and Sexual Activity    Alcohol use: No    Drug use: No    Sexual activity: Yes     Partners: Male     Birth control/protection: None   Lifestyle    Physical activity:     Days per week: Not on file     Minutes per session: Not on file    Stress: Not on file   Relationships    Social connections:     Talks on phone: Not on file     Gets together: Not on file     Attends Jew service: Not on file     Active member of club or organization: Not on file     Attends meetings of clubs or organizations: Not on file     Relationship status: Not on file   Other Topics Concern    Not on file   Social History Narrative    Works as     Lives with  and daughter         Chemistry        Component Value Date/Time     07/20/2019 0931    K 4.3 07/20/2019 0931     07/20/2019 0931    CO2 21 (L) 07/20/2019 0931    BUN 6 07/20/2019 0931    CREATININE 0.7 07/20/2019 0931     07/20/2019 0931        Component Value Date/Time    CALCIUM 9.5 07/20/2019 0931    ALKPHOS 34 (L) 02/25/2019 0810    AST 14 02/25/2019 0810    ALT 10 02/25/2019 0810    BILITOT 0.3 02/25/2019 0810    ESTGFRAFRICA >60 07/20/2019 0931    EGFRNONAA >60 07/20/2019 0931            Lab Results   Component Value Date    WBC 15.63 (H) 02/13/2020    HGB 12.7 02/13/2020    HCT 37.9 02/13/2020    MCV 88 02/13/2020     02/13/2020       No results for input(s): PT, INR, PROTIME, APTT in the last 72 hours.          Anesthesia Evaluation    I have reviewed the Patient Summary Reports.    I have reviewed the Nursing Notes.   I have reviewed the Medications.     Review of Systems  Anesthesia Hx:  No problems with previous Anesthesia  Denies Family Hx of Anesthesia complications.   Denies Personal Hx of Anesthesia complications.   Hematology/Oncology:  Hematology Normal   Oncology Normal     Cardiovascular:  Cardiovascular Normal     Pulmonary:  Pulmonary Normal     Renal/:  Renal/ Normal     Hepatic/GI:  Hepatic/GI Normal    Musculoskeletal:  Musculoskeletal Normal    Neurological:  Neurology Normal    Endocrine:  Endocrine Normal           Anesthesia Plan  Type of Anesthesia, risks & benefits discussed:  Anesthesia Type:  general  Patient's Preference:   Intra-op Monitoring Plan: standard ASA monitors  Intra-op Monitoring Plan Comments:   Post Op Pain Control Plan: per primary service following discharge from PACU  Post Op Pain Control Plan Comments:   Induction:   IV  Beta Blocker:         Informed Consent: Patient understands risks and agrees with Anesthesia plan.  Questions answered. Anesthesia consent signed with patient.  ASA Score: 2     Day of Surgery Review of History & Physical:    H&P update referred to the surgeon.         Ready For Surgery From Anesthesia Perspective.

## 2020-02-13 NOTE — ANESTHESIA PROCEDURE NOTES
Epidural    Patient location during procedure: OB   Reason for block: primary anesthetic   Diagnosis: IUP   Start time: 2/13/2020 1:11 PM  Timeout: 2/13/2020 1:10 PM  End time: 2/13/2020 1:22 PM  Surgery related to: Vaginal Delivery    Staffing  Performing Provider: Inderjit Landaverde MD  Authorizing Provider: Lyubov Horton MD        Preanesthetic Checklist  Completed: patient identified, site marked, surgical consent, pre-op evaluation, timeout performed, IV checked, risks and benefits discussed, monitors and equipment checked, anesthesia consent given, hand hygiene performed and patient being monitored  Preparation  Patient position: sitting  Prep: ChloraPrep  Patient monitoring: Pulse Ox  Epidural  Skin Anesthetic: lidocaine 1%  Skin Wheal: 3 mL  Administration type: continuous  Approach: midline  Interspace: L3-4    Injection technique: SHAHEEN saline  Needle and Epidural Catheter  Needle type: Tuohy   Needle gauge: 17  Needle length: 3.5 inches  Needle insertion depth: 6 cm  Catheter type: springwound  Catheter size: 19 G  Catheter at skin depth: 10 cm  Test dose: 3 mL of lidocaine 1.5% with Epi 1-to-200,000  Additional Documentation: incremental injection, negative aspiration for heme and CSF, no paresthesia on injection, no signs/symptoms of IV or SA injection, no significant pain on injection and no significant complaints from patient  Needle localization: anatomical landmarks  Medications:  Volume per aspiration: 5 mL  Time between aspirations: 5 minutes  Assessment  Upper dermatomal levels - Left: T9  Right: T7  Ease of block: easy  Patient's tolerance of the procedure: comfortable throughout block and no complaintsNo inadvertent dural puncture with Tuohy.  Dural puncture performed with spinal needle.

## 2020-02-13 NOTE — PROGRESS NOTES
LABOR PROGRESS NOTE    S:  MD to bedside for cervical check. Complaints: None.      O: Temp:  [97.2 °F (36.2 °C)-98.5 °F (36.9 °C)] 97.2 °F (36.2 °C)  Pulse:  [] 69  Resp:  [16-20] 18  SpO2:  [94 %-99 %] 97 %  BP: (116-141)/(54-81) 140/64    FHT: 135 BPM/moderate beat to beat variability/+accels/-decels   CTX: coupling q5    Dilation (cm): 3.5  Effacement (%): 90  Station: -3  Dose(units) Oxytocin: *6 fang-units/min     ASSESSMENT:   34 y.o.  at 39w5d; relatively unchanged from previous check  FHT reassuring  Cat 1  Discussed extensively the use of pitocin and role of AROM in labor      TIMELINE  1045: 3.5/90/-1  1230: 3.5/80/-2  1445: 3.5/90/-1; AROM and starting Pit.   1645: 4/-1      PLAN:  1. Labor management  -Continue Close Maternal/Fetal Monitoring.   -Pitocin augmentation per protocol,   -Recheck 2-4 hours or PRN    SHANNON Cruz MD  OBGYN PGY1

## 2020-02-13 NOTE — ED NOTES
Patient, partner, and  have decided to walk and return to [facility] for recheck in 2 hours at 1058.

## 2020-02-14 PROBLEM — O34.219 VBAC, DELIVERED: Status: ACTIVE | Noted: 2020-02-13

## 2020-02-14 LAB
BASOPHILS # BLD AUTO: 0.06 K/UL (ref 0–0.2)
BASOPHILS NFR BLD: 0.3 % (ref 0–1.9)
DIFFERENTIAL METHOD: ABNORMAL
EOSINOPHIL # BLD AUTO: 0.1 K/UL (ref 0–0.5)
EOSINOPHIL NFR BLD: 0.7 % (ref 0–8)
ERYTHROCYTE [DISTWIDTH] IN BLOOD BY AUTOMATED COUNT: 13.1 % (ref 11.5–14.5)
HCT VFR BLD AUTO: 31.2 % (ref 37–48.5)
HGB BLD-MCNC: 10.1 G/DL (ref 12–16)
IMM GRANULOCYTES # BLD AUTO: 0.11 K/UL (ref 0–0.04)
IMM GRANULOCYTES NFR BLD AUTO: 0.6 % (ref 0–0.5)
LYMPHOCYTES # BLD AUTO: 3.1 K/UL (ref 1–4.8)
LYMPHOCYTES NFR BLD: 18 % (ref 18–48)
MCH RBC QN AUTO: 29.4 PG (ref 27–31)
MCHC RBC AUTO-ENTMCNC: 32.4 G/DL (ref 32–36)
MCV RBC AUTO: 91 FL (ref 82–98)
MONOCYTES # BLD AUTO: 0.9 K/UL (ref 0.3–1)
MONOCYTES NFR BLD: 5.4 % (ref 4–15)
NEUTROPHILS # BLD AUTO: 13.1 K/UL (ref 1.8–7.7)
NEUTROPHILS NFR BLD: 75 % (ref 38–73)
NRBC BLD-RTO: 0 /100 WBC
PLATELET # BLD AUTO: 217 K/UL (ref 150–350)
PMV BLD AUTO: 10.2 FL (ref 9.2–12.9)
RBC # BLD AUTO: 3.44 M/UL (ref 4–5.4)
WBC # BLD AUTO: 17.46 K/UL (ref 3.9–12.7)

## 2020-02-14 PROCEDURE — 72200005 HC VAGINAL DELIVERY LEVEL II

## 2020-02-14 PROCEDURE — 63600175 PHARM REV CODE 636 W HCPCS: Performed by: STUDENT IN AN ORGANIZED HEALTH CARE EDUCATION/TRAINING PROGRAM

## 2020-02-14 PROCEDURE — 11000001 HC ACUTE MED/SURG PRIVATE ROOM

## 2020-02-14 PROCEDURE — 72100003 HC LABOR CARE, EA. ADDL. 8 HRS

## 2020-02-14 PROCEDURE — 25000003 PHARM REV CODE 250: Performed by: STUDENT IN AN ORGANIZED HEALTH CARE EDUCATION/TRAINING PROGRAM

## 2020-02-14 PROCEDURE — 85025 COMPLETE CBC W/AUTO DIFF WBC: CPT

## 2020-02-14 PROCEDURE — 72200004 HC VAGINAL DELIVERY LEVEL I

## 2020-02-14 PROCEDURE — 59610 PR ROUT OB CARE,VAG DELIV,PREV C-SEC: ICD-10-PCS | Mod: GB,,, | Performed by: OBSTETRICS & GYNECOLOGY

## 2020-02-14 PROCEDURE — 36415 COLL VENOUS BLD VENIPUNCTURE: CPT

## 2020-02-14 RX ORDER — METHYLERGONOVINE MALEATE 0.2 MG/ML
INJECTION INTRAVENOUS
Status: DISCONTINUED
Start: 2020-02-14 | End: 2020-02-14 | Stop reason: WASHOUT

## 2020-02-14 RX ORDER — MISOPROSTOL 200 UG/1
TABLET ORAL
Status: DISCONTINUED
Start: 2020-02-14 | End: 2020-02-14 | Stop reason: WASHOUT

## 2020-02-14 RX ORDER — FENTANYL CITRATE 50 UG/ML
INJECTION, SOLUTION INTRAMUSCULAR; INTRAVENOUS
Status: COMPLETED
Start: 2020-02-14 | End: 2020-02-14

## 2020-02-14 RX ORDER — IBUPROFEN 600 MG/1
600 TABLET ORAL EVERY 6 HOURS
Status: DISCONTINUED | OUTPATIENT
Start: 2020-02-14 | End: 2020-02-16 | Stop reason: HOSPADM

## 2020-02-14 RX ORDER — OXYTOCIN/RINGER'S LACTATE 30/500 ML
41.65 PLASTIC BAG, INJECTION (ML) INTRAVENOUS CONTINUOUS
Status: ACTIVE | OUTPATIENT
Start: 2020-02-14 | End: 2020-02-14

## 2020-02-14 RX ORDER — HYDROCORTISONE 25 MG/G
CREAM TOPICAL 3 TIMES DAILY PRN
Status: DISCONTINUED | OUTPATIENT
Start: 2020-02-14 | End: 2020-02-16 | Stop reason: HOSPADM

## 2020-02-14 RX ORDER — CARBOPROST TROMETHAMINE 250 UG/ML
INJECTION, SOLUTION INTRAMUSCULAR
Status: DISCONTINUED
Start: 2020-02-14 | End: 2020-02-14 | Stop reason: WASHOUT

## 2020-02-14 RX ORDER — OXYTOCIN 10 [USP'U]/ML
INJECTION, SOLUTION INTRAMUSCULAR; INTRAVENOUS
Status: DISCONTINUED
Start: 2020-02-14 | End: 2020-02-14 | Stop reason: WASHOUT

## 2020-02-14 RX ORDER — BUPIVACAINE HYDROCHLORIDE 2.5 MG/ML
INJECTION, SOLUTION EPIDURAL; INFILTRATION; INTRACAUDAL
Status: DISPENSED
Start: 2020-02-14 | End: 2020-02-14

## 2020-02-14 RX ORDER — TERBUTALINE SULFATE 1 MG/ML
INJECTION SUBCUTANEOUS
Status: DISCONTINUED
Start: 2020-02-14 | End: 2020-02-14 | Stop reason: WASHOUT

## 2020-02-14 RX ORDER — HYDROCODONE BITARTRATE AND ACETAMINOPHEN 10; 325 MG/1; MG/1
1 TABLET ORAL EVERY 4 HOURS PRN
Status: DISCONTINUED | OUTPATIENT
Start: 2020-02-14 | End: 2020-02-16 | Stop reason: HOSPADM

## 2020-02-14 RX ORDER — FENTANYL CITRATE 50 UG/ML
INJECTION, SOLUTION INTRAMUSCULAR; INTRAVENOUS
Status: DISCONTINUED | OUTPATIENT
Start: 2020-02-14 | End: 2020-02-14

## 2020-02-14 RX ORDER — DIPHENHYDRAMINE HCL 25 MG
25 CAPSULE ORAL EVERY 4 HOURS PRN
Status: DISCONTINUED | OUTPATIENT
Start: 2020-02-14 | End: 2020-02-16 | Stop reason: HOSPADM

## 2020-02-14 RX ORDER — FENTANYL/BUPIVACAINE/NS/PF 2MCG/ML-.1
PLASTIC BAG, INJECTION (ML) INJECTION
Status: DISPENSED
Start: 2020-02-14 | End: 2020-02-14

## 2020-02-14 RX ORDER — ONDANSETRON 8 MG/1
8 TABLET, ORALLY DISINTEGRATING ORAL EVERY 8 HOURS PRN
Status: DISCONTINUED | OUTPATIENT
Start: 2020-02-14 | End: 2020-02-16 | Stop reason: HOSPADM

## 2020-02-14 RX ORDER — HYDROCODONE BITARTRATE AND ACETAMINOPHEN 5; 325 MG/1; MG/1
1 TABLET ORAL EVERY 4 HOURS PRN
Status: DISCONTINUED | OUTPATIENT
Start: 2020-02-14 | End: 2020-02-16 | Stop reason: HOSPADM

## 2020-02-14 RX ORDER — DOCUSATE SODIUM 100 MG/1
200 CAPSULE, LIQUID FILLED ORAL 2 TIMES DAILY
Status: DISCONTINUED | OUTPATIENT
Start: 2020-02-14 | End: 2020-02-16 | Stop reason: HOSPADM

## 2020-02-14 RX ORDER — DIPHENHYDRAMINE HYDROCHLORIDE 50 MG/ML
25 INJECTION INTRAMUSCULAR; INTRAVENOUS EVERY 4 HOURS PRN
Status: DISCONTINUED | OUTPATIENT
Start: 2020-02-14 | End: 2020-02-16 | Stop reason: HOSPADM

## 2020-02-14 RX ORDER — ACETAMINOPHEN 325 MG/1
650 TABLET ORAL EVERY 6 HOURS PRN
Status: DISCONTINUED | OUTPATIENT
Start: 2020-02-14 | End: 2020-02-16 | Stop reason: HOSPADM

## 2020-02-14 RX ADMIN — FENTANYL CITRATE 100 MCG: 50 INJECTION, SOLUTION INTRAMUSCULAR; INTRAVENOUS at 03:02

## 2020-02-14 RX ADMIN — IBUPROFEN 600 MG: 600 TABLET, FILM COATED ORAL at 06:02

## 2020-02-14 RX ADMIN — IBUPROFEN 600 MG: 600 TABLET, FILM COATED ORAL at 11:02

## 2020-02-14 RX ADMIN — HYDROCODONE BITARTRATE AND ACETAMINOPHEN 1 TABLET: 5; 325 TABLET ORAL at 08:02

## 2020-02-14 RX ADMIN — IBUPROFEN 600 MG: 600 TABLET, FILM COATED ORAL at 12:02

## 2020-02-14 RX ADMIN — DOCUSATE SODIUM 200 MG: 100 CAPSULE, LIQUID FILLED ORAL at 08:02

## 2020-02-14 NOTE — L&D DELIVERY NOTE
[facility]  Vaginal Delivery   Operative Note    SUMMARY     Normal spontaneous vaginal delivery of live infant, was placed on mothers abdomen for skin to skin and bulb suctioning performed.  Infant delivered position OA over intact perineum.  Nuchal cord: No.    Spontaneous delivery of placenta and IV pitocin given noting good uterine tone.  2nd degree laceration noted and repaired in normal fashion with 0 chromic and 2-0 vicryl. Rectal exam performed at conclusion of repair confirmed absence of suture or defect in the rectum.  Patient tolerated delivery well. Sponge needle and lap counted correctly x2.    Indications: , delivered  Pregnancy complicated by:   Patient Active Problem List   Diagnosis    H/O  section    Pregnancy with one fetus, antepartum    Instability of pelvis or thigh joint    Pregnancy related pelvic pain in third trimester, antepartum    Weakness of both hips    , delivered     Admitting GA: 39w6d    Delivery Information for Ellie Ugarte    Birth information:  YOB: 2020   Time of birth: 5:21 AM   Sex: female   Head Delivery Date/Time: 2020  5:21 AM   Delivery type: , Spontaneous   Gestational Age: 39w6d    Delivery Providers    Delivering clinician:  Rojelio Paredes MD   Provider Role    Colleen Fletcher MD Resident    Cathy Rosa, RN Registered Nurse    Nata Becerril RN Registered Nurse    Carol King, RN Charge Nurse    Trixie Redman, RN Registered Nurse    Dona KNIGHT [facility]            Measurements    Weight:  3140 g  Length:  48.9 cm  Head circumference:  32.4 cm  Chest circumference:  32.4 cm         Apgars    Living status:  Living  Apgars:   1 min.:   5 min.:   10 min.:   15 min.:   20 min.:     Skin color:   0  1       Heart rate:   2  2       Reflex irritability:   2  2       Muscle tone:   2  2       Respiratory effort:   2  2       Total:   8  9       Apgars assigned  by:  GIOVANNI DOLAN RN         Operative Delivery    Forceps attempted?:  No  Vacuum extractor attempted?:  No         Shoulder Dystocia    Shoulder dystocia present?:  No           Presentation    Presentation:  Vertex  Position:  Occiput Anterior           Interventions/Resuscitation    Method:  Bulb Suctioning, Tactile Stimulation       Cord    Vessels:  3 vessels  Complications:  None  Cord Blood Disposition:  Sent with Baby  Gases Sent?:  No  Stem Cell Collection (by MD):  No       Placenta    Placenta delivery date/time:  2020 0529  Placenta removal:  Spontaneous  Placenta appearance:  Intact  Placenta disposition:  discarded           Labor Events:       labor: No     Labor Onset Date/Time:         Dilation Complete Date/Time:         Start Pushing Date/Time:         Start Pushing Date/Time:       Rupture Date/Time: 20         Rupture type:           Fluid Amount:        Fluid Color: Clear      Fluid Odor:        Membrane Status: ARM (Artificial Rupture)               steroids: None     Antibiotics given for GBS: No     Induction:       Indications for induction:        Augmentation: amniotomy;oxytocin     Indications for augmentation: Ineffective Contraction Pattern     Labor complications: None     Additional complications:          Cervical ripening:                     Delivery:      Episiotomy:       Indication for Episiotomy:       Perineal Lacerations: 2nd Repaired:  Yes   Periurethral Laceration:   Repaired:     Labial Laceration:   Repaired:     Sulcus Laceration:   Repaired:     Vaginal Laceration:   Repaired:     Cervical Laceration:   Repaired:     Repair suture:       Repair # of packets: 3     Last Value - EBL - Nursing (mL):       Sum - EBL - Nursing (mL): 0     Last Value - EBL - Anesthesia (mL):      Calculated QBL (mL): 377      Vaginal Sweep Performed: Yes     Surgicount Correct: Yes       Other providers:       Anesthesia    Method:  Epidural           Details (if applicable):  Trial of Labor      Categorization:      Priority:     Indications for :     Incision Type:       Additional  information:  Forceps:    Vacuum:    Breech:    Observed anomalies    Other (Comments):           Colleen Fletcher MD  OBGYN PGY-2

## 2020-02-14 NOTE — PROGRESS NOTES
LABOR PROGRESS NOTE    S:  MD to bedside for cervical check. Complaints: None.      O: Temp:  [97 °F (36.1 °C)-98.5 °F (36.9 °C)] 97.2 °F (36.2 °C)  Pulse:  [] 88  Resp:  [16-20] 16  SpO2:  [94 %-99 %] 97 %  BP: (116-147)/(54-85) 145/85    FHT: 140 BPM/moderate beat to beat variability/+accels/ early decels   CTX: q 3-4 minutes    Dilation (cm): 9  Effacement (%): 90  Station: 0  Dose(units) Oxytocin: *24 fang-units/min     ASSESSMENT:   34 y.o.  at 39w5d; relatively unchanged from previous check  FHT reassuring  Cat 1  Discussed extensively the use of pitocin and role of AROM in labor      TIMELINE  1045: 3.5/90/-1  1230: 3.5/80/-2  1445: 3.5//-1; AROM and starting Pit.   1645: 4/-1  1730: 6/-1  1930: 7/-1  2300: /0  0130: 0      PLAN:  1. Labor management  -Continue Close Maternal/Fetal Monitoring.   -Pitocin augmentation per protocol,   -Recheck 2-4 hours or PRN    Colleen Fletcher MD  OBGYN PGY-2

## 2020-02-14 NOTE — LACTATION NOTE
02/14/20 1420   Maternal Assessment   Breast Shape Bilateral:;round   Breast Density Bilateral:;soft   Areola Bilateral:;elastic   Nipples Bilateral:;everted   Maternal Infant Feeding   Maternal Emotional State assist needed   Infant Positioning cross-cradle   Signs of Milk Transfer audible swallow;infant jaw motion present   Latch Assistance yes   assisted pt with position and latch. Baby was able to latch to breast in cross cradle position. Good tugs and pulls observed. Basic breastfeeding education provided. Questions answered. Lanolin given for nipple tenderness.

## 2020-02-14 NOTE — PROGRESS NOTES
LABOR PROGRESS NOTE    S:  MD to bedside for cervical check. Complaints: None.      O: Temp:  [97 °F (36.1 °C)-98.5 °F (36.9 °C)] 97 °F (36.1 °C)  Pulse:  [] 86  Resp:  [16-20] 18  SpO2:  [94 %-99 %] 96 %  BP: (116-147)/(54-81) 130/60    FHT: 140 BPM/moderate beat to beat variability/+accels/-decels   CTX: coupling q5    Dilation (cm): 7  Effacement (%): 90  Station: -1  Dose(units) Oxytocin: *14 fang-units/min     ASSESSMENT:   34 y.o.  at 39w5d; relatively unchanged from previous check  FHT reassuring  Cat 1  Discussed extensively the use of pitocin and role of AROM in labor      TIMELINE  1045: 3.5/-1  1230: 3.5/80/-2  1445: 3.5//-1; AROM and starting Pit.   1645: 4-1  1730: 61  1930: 71      PLAN:  1. Labor management  -Continue Close Maternal/Fetal Monitoring.   -Pitocin augmentation per protocol,   -Recheck 2-4 hours or PRN    Anirudh Finley MD  PGY-4 OB/GYN

## 2020-02-15 PROCEDURE — 25000003 PHARM REV CODE 250: Performed by: STUDENT IN AN ORGANIZED HEALTH CARE EDUCATION/TRAINING PROGRAM

## 2020-02-15 PROCEDURE — 11000001 HC ACUTE MED/SURG PRIVATE ROOM

## 2020-02-15 RX ORDER — IBUPROFEN 600 MG/1
600 TABLET ORAL EVERY 6 HOURS
Qty: 30 TABLET | Refills: 1 | Status: SHIPPED | OUTPATIENT
Start: 2020-02-16 | End: 2021-05-07

## 2020-02-15 RX ADMIN — IBUPROFEN 600 MG: 600 TABLET, FILM COATED ORAL at 06:02

## 2020-02-15 RX ADMIN — IBUPROFEN 600 MG: 600 TABLET, FILM COATED ORAL at 05:02

## 2020-02-15 RX ADMIN — DOCUSATE SODIUM 200 MG: 100 CAPSULE, LIQUID FILLED ORAL at 09:02

## 2020-02-15 RX ADMIN — DOCUSATE SODIUM 200 MG: 100 CAPSULE, LIQUID FILLED ORAL at 08:02

## 2020-02-15 RX ADMIN — HYDROCODONE BITARTRATE AND ACETAMINOPHEN 1 TABLET: 10; 325 TABLET ORAL at 12:02

## 2020-02-15 RX ADMIN — IBUPROFEN 600 MG: 600 TABLET, FILM COATED ORAL at 12:02

## 2020-02-15 NOTE — PROGRESS NOTES
POSTPARTUM PROGRESS NOTE     Ray Ugarte is a 34 y.o. female PPD #1 status post Successful  at 39w6d in a pregnancy complicated by hx cs x1, hx postpartum hemorrhage. Patient is doing well this morning. She denies nausea, vomiting, fever or chills.  Patient reports mild abdominal pain that is adequately relieved by oral pain medications. Lochia is mild to moderate  and stable. Patient is voiding without difficulty and ambulating with no difficulty. She has passed flatus.  Patient does plan to breast feed. Per Dr. ibarra for contraception.     Objective:       Temp:  [97.2 °F (36.2 °C)-98.4 °F (36.9 °C)] 98.3 °F (36.8 °C)  Pulse:  [] 90  Resp:  [16-18] 17  SpO2:  [93 %-100 %] 97 %  BP: (101-145)/(60-85) 120/60    General:   alert, appears stated age and cooperative   Lungs:   Non-labored respirations    Heart:   regular rate and rhythm   Abdomen:  Soft, nondistended    Uterus:  firm located at the umblicus.    Extremities: no pedal edema noted     Lab Review  Recent Labs   Lab 20  1056 20  2019   WBC 15.63* 17.46*   HGB 12.7 10.1*   HCT 37.9 31.2*   MCV 88 91    217        Recent Labs   Lab 20  1823   *   K 4.3      CO2 17*   BUN 4*   CREATININE 0.5   GLU 77   PROT 4.6*   BILITOT 0.2   ALKPHOS 92   ALT 5*   AST 8*          I/O    Intake/Output Summary (Last 24 hours) at 2/15/2020 0040  Last data filed at 2020 1448  Gross per 24 hour   Intake 257.53 ml   Output 3027 ml   Net -2769.47 ml        Assessment:     Patient Active Problem List   Diagnosis    H/O  section    Pregnancy with one fetus, antepartum    Instability of pelvis or thigh joint    Pregnancy related pelvic pain in third trimester, antepartum    Weakness of both hips    , delivered        Plan:   1. Postpartum care:  - Patient doing well. Continue routine management and advances.  - Continue PO pain meds. Pain well controlled.  - Heme: H/h 12.7/38 >>> Post 10/31  - Encourage  ambulation  - Contraception to be discussed at 6 week pp visit  - Lactation consult PRN    2. gHTN   -BP: (101-138)/(60-78) 120/60   -UPC: 0.28, Cr 0.5, AST/ALT 8/5, Plt 217   -denies HA, visual disturbances, CP, SOB, RUQ pain   -does not require oral agent at this time    Dispo: As patient meets milestones, will plan to discharge PPD1-2.    Christian Lepe MD  OBGYN PGY-1

## 2020-02-15 NOTE — ANESTHESIA POSTPROCEDURE EVALUATION
Anesthesia Post Evaluation    Patient: Ray Ugarte    Procedure(s) Performed: * No procedures listed *    Final Anesthesia Type: epidural    Patient location during evaluation: floor  Patient participation: Yes- Able to Participate  Level of consciousness: awake and alert and oriented  Post-procedure vital signs: reviewed and stable  Pain management: adequate  Airway patency: patent    PONV status at discharge: No PONV  Anesthetic complications: no    Scarlet-operative Events Comments: Residual anterior thigh numbness  Cardiovascular status: blood pressure returned to baseline and hemodynamically stable  Respiratory status: unassisted, spontaneous ventilation and room air  Hydration status: euvolemic  Follow-up not needed.          Vitals Value Taken Time   /73 2/15/2020  8:41 AM   Temp 36.7 °C (98 °F) 2/15/2020  8:41 AM   Pulse 83 2/15/2020  8:41 AM   Resp 18 2/15/2020  8:41 AM   SpO2 96 % 2/15/2020  8:41 AM         No case tracking events are documented in the log.      Pain/Mery Score: Pain Rating Prior to Med Admin: 5 (2/15/2020 12:25 PM)  Pain Rating Post Med Admin: 0 (2/15/2020  6:27 AM)

## 2020-02-15 NOTE — LACTATION NOTE
02/15/20 1025   Maternal Assessment   Breast Shape Bilateral:;round   Breast Density Bilateral:;soft   Areola Bilateral:;elastic   Nipples Bilateral:;everted   Maternal Infant Feeding   Maternal Emotional State relaxed;assist needed   Infant Positioning clutch/football   Signs of Milk Transfer audible swallow;infant jaw motion present   Latch Assistance yes   pt independent with cross cradle position. Assisted pt with football position. Baby latched easily to breast. Good tugs and pulls observed. Basic breastfeeding education reviewed. Questions answered.

## 2020-02-16 VITALS
TEMPERATURE: 98 F | HEART RATE: 84 BPM | WEIGHT: 185.88 LBS | BODY MASS INDEX: 29.87 KG/M2 | RESPIRATION RATE: 18 BRPM | DIASTOLIC BLOOD PRESSURE: 71 MMHG | OXYGEN SATURATION: 98 % | HEIGHT: 66 IN | SYSTOLIC BLOOD PRESSURE: 122 MMHG

## 2020-02-16 PROCEDURE — 25000003 PHARM REV CODE 250: Performed by: STUDENT IN AN ORGANIZED HEALTH CARE EDUCATION/TRAINING PROGRAM

## 2020-02-16 RX ADMIN — IBUPROFEN 600 MG: 600 TABLET, FILM COATED ORAL at 12:02

## 2020-02-16 RX ADMIN — IBUPROFEN 600 MG: 600 TABLET, FILM COATED ORAL at 05:02

## 2020-02-16 RX ADMIN — HYDROCODONE BITARTRATE AND ACETAMINOPHEN 1 TABLET: 5; 325 TABLET ORAL at 10:02

## 2020-02-16 RX ADMIN — DOCUSATE SODIUM 200 MG: 100 CAPSULE, LIQUID FILLED ORAL at 08:02

## 2020-02-16 NOTE — PROGRESS NOTES
POSTPARTUM PROGRESS NOTE     Ray Ugarte is a 34 y.o. female PPD #2 status post Successful  at 39w6d in a pregnancy complicated by hx cs x1, hx postpartum hemorrhage. Patient is doing well this morning. She denies nausea, vomiting, fever or chills.  Patient reports mild abdominal pain that is adequately relieved by oral pain medications. Lochia is mild to moderate  and stable. Patient is voiding without difficulty and ambulating with no difficulty. She has passed flatus.  Patient does plan to breast feed. Per Dr. Khan for contraception.     Objective:       Temp:  [97.7 °F (36.5 °C)-98.1 °F (36.7 °C)] 97.7 °F (36.5 °C)  Pulse:  [79-88] 88  Resp:  [17-18] 18  SpO2:  [96 %] 96 %  BP: (114-123)/(55-73) 123/    General:   alert, appears stated age and cooperative   Lungs:   Non-labored respirations    Heart:   regular rate and rhythm   Abdomen:  Soft, nondistended    Uterus:  firm located at the umblicus.    Extremities: no pedal edema noted     Lab Review  Recent Labs   Lab 20  1056 20  2019   WBC 15.63* 17.46*   HGB 12.7 10.1*   HCT 37.9 31.2*   MCV 88 91    217        Recent Labs   Lab 20  1823   *   K 4.3      CO2 17*   BUN 4*   CREATININE 0.5   GLU 77   PROT 4.6*   BILITOT 0.2   ALKPHOS 92   ALT 5*   AST 8*          I/O  No intake or output data in the 24 hours ending 20 0515     Assessment:     Patient Active Problem List   Diagnosis    H/O  section    Pregnancy with one fetus, antepartum    Instability of pelvis or thigh joint    Pregnancy related pelvic pain in third trimester, antepartum    Weakness of both hips    , delivered        Plan:   1. Postpartum care:  - Patient doing well. Plan for dispo today.   - Continue PO pain meds. Pain well controlled.  - Heme: H/h 12.7/38 >>> Post 10/31  - Encourage ambulation  - Contraception to be discussed at 6 week pp visit  - Lactation consult PRN    2. gHTN  - BP: (114-123)/(55-73) 123/  - UPC:  0.28, Cr 0.5, AST/ALT 8/5, Plt 217  - denies HA, visual disturbances, CP, SOB, RUQ pain  - does not require oral agent at this time  - 1 week blood pressure check    Dispo: Dispo today.    Deepika Silva M.D.  [facility] PGY1

## 2020-02-20 ENCOUNTER — POSTPARTUM VISIT (OUTPATIENT)
Dept: OBSTETRICS AND GYNECOLOGY | Facility: CLINIC | Age: 35
End: 2020-02-20
Payer: COMMERCIAL

## 2020-02-20 ENCOUNTER — LAB VISIT (OUTPATIENT)
Dept: LAB | Facility: OTHER | Age: 35
End: 2020-02-20
Attending: NURSE PRACTITIONER
Payer: COMMERCIAL

## 2020-02-20 VITALS
OXYGEN SATURATION: 99 % | SYSTOLIC BLOOD PRESSURE: 120 MMHG | BODY MASS INDEX: 30.01 KG/M2 | DIASTOLIC BLOOD PRESSURE: 70 MMHG | HEIGHT: 66 IN | HEART RATE: 88 BPM | TEMPERATURE: 99 F

## 2020-02-20 DIAGNOSIS — R42 DIZZINESS: ICD-10-CM

## 2020-02-20 DIAGNOSIS — Z01.30 BLOOD PRESSURE CHECK: Primary | ICD-10-CM

## 2020-02-20 DIAGNOSIS — O92.79 POOR LATCH ON, POSTPARTUM: ICD-10-CM

## 2020-02-20 LAB
ALBUMIN SERPL BCP-MCNC: 2.8 G/DL (ref 3.5–5.2)
ALP SERPL-CCNC: 89 U/L (ref 55–135)
ALT SERPL W/O P-5'-P-CCNC: 20 U/L (ref 10–44)
ANION GAP SERPL CALC-SCNC: 6 MMOL/L (ref 8–16)
AST SERPL-CCNC: 16 U/L (ref 10–40)
BASOPHILS # BLD AUTO: 0.01 K/UL (ref 0–0.2)
BASOPHILS NFR BLD: 0.1 % (ref 0–1.9)
BILIRUB SERPL-MCNC: 0.2 MG/DL (ref 0.1–1)
BUN SERPL-MCNC: 8 MG/DL (ref 6–20)
CALCIUM SERPL-MCNC: 9.2 MG/DL (ref 8.7–10.5)
CHLORIDE SERPL-SCNC: 110 MMOL/L (ref 95–110)
CO2 SERPL-SCNC: 24 MMOL/L (ref 23–29)
CREAT SERPL-MCNC: 0.7 MG/DL (ref 0.5–1.4)
CREAT UR-MCNC: 27 MG/DL (ref 15–325)
DIFFERENTIAL METHOD: ABNORMAL
EOSINOPHIL # BLD AUTO: 0.2 K/UL (ref 0–0.5)
EOSINOPHIL NFR BLD: 1.6 % (ref 0–8)
ERYTHROCYTE [DISTWIDTH] IN BLOOD BY AUTOMATED COUNT: 12.3 % (ref 11.5–14.5)
EST. GFR  (AFRICAN AMERICAN): >60 ML/MIN/1.73 M^2
EST. GFR  (NON AFRICAN AMERICAN): >60 ML/MIN/1.73 M^2
GLUCOSE SERPL-MCNC: 78 MG/DL (ref 70–110)
HCT VFR BLD AUTO: 35.3 % (ref 37–48.5)
HGB BLD-MCNC: 11.4 G/DL (ref 12–16)
IMM GRANULOCYTES # BLD AUTO: 0.12 K/UL (ref 0–0.04)
IMM GRANULOCYTES NFR BLD AUTO: 1.2 % (ref 0–0.5)
LDH SERPL L TO P-CCNC: 214 U/L (ref 110–260)
LYMPHOCYTES # BLD AUTO: 2.2 K/UL (ref 1–4.8)
LYMPHOCYTES NFR BLD: 21.4 % (ref 18–48)
MCH RBC QN AUTO: 28.7 PG (ref 27–31)
MCHC RBC AUTO-ENTMCNC: 32.3 G/DL (ref 32–36)
MCV RBC AUTO: 89 FL (ref 82–98)
MONOCYTES # BLD AUTO: 0.7 K/UL (ref 0.3–1)
MONOCYTES NFR BLD: 6.8 % (ref 4–15)
NEUTROPHILS # BLD AUTO: 7.1 K/UL (ref 1.8–7.7)
NEUTROPHILS NFR BLD: 68.9 % (ref 38–73)
NRBC BLD-RTO: 0 /100 WBC
PLATELET # BLD AUTO: 243 K/UL (ref 150–350)
PMV BLD AUTO: 9.2 FL (ref 9.2–12.9)
POTASSIUM SERPL-SCNC: 4.6 MMOL/L (ref 3.5–5.1)
PROT SERPL-MCNC: 6.9 G/DL (ref 6–8.4)
PROT UR-MCNC: <7 MG/DL (ref 0–15)
PROT/CREAT UR: NORMAL MG/G{CREAT} (ref 0–0.2)
RBC # BLD AUTO: 3.97 M/UL (ref 4–5.4)
SODIUM SERPL-SCNC: 140 MMOL/L (ref 136–145)
WBC # BLD AUTO: 10.3 K/UL (ref 3.9–12.7)

## 2020-02-20 PROCEDURE — 99999 PR PBB SHADOW E&M-EST. PATIENT-LVL III: ICD-10-PCS | Mod: PBBFAC,,, | Performed by: NURSE PRACTITIONER

## 2020-02-20 PROCEDURE — 80053 COMPREHEN METABOLIC PANEL: CPT

## 2020-02-20 PROCEDURE — 3008F BODY MASS INDEX DOCD: CPT | Mod: CPTII,S$GLB,, | Performed by: NURSE PRACTITIONER

## 2020-02-20 PROCEDURE — 3008F PR BODY MASS INDEX (BMI) DOCUMENTED: ICD-10-PCS | Mod: CPTII,S$GLB,, | Performed by: NURSE PRACTITIONER

## 2020-02-20 PROCEDURE — 99213 PR OFFICE/OUTPT VISIT, EST, LEVL III, 20-29 MIN: ICD-10-PCS | Mod: 24,S$GLB,, | Performed by: NURSE PRACTITIONER

## 2020-02-20 PROCEDURE — 82570 ASSAY OF URINE CREATININE: CPT

## 2020-02-20 PROCEDURE — 83615 LACTATE (LD) (LDH) ENZYME: CPT

## 2020-02-20 PROCEDURE — 99999 PR PBB SHADOW E&M-EST. PATIENT-LVL III: CPT | Mod: PBBFAC,,, | Performed by: NURSE PRACTITIONER

## 2020-02-20 PROCEDURE — 85025 COMPLETE CBC W/AUTO DIFF WBC: CPT

## 2020-02-20 PROCEDURE — 36415 COLL VENOUS BLD VENIPUNCTURE: CPT

## 2020-02-20 PROCEDURE — 99213 OFFICE O/P EST LOW 20 MIN: CPT | Mod: 24,S$GLB,, | Performed by: NURSE PRACTITIONER

## 2020-02-20 NOTE — PROGRESS NOTES
History & Physical  Gynecology      SUBJECTIVE:     Chief Complaint:   No chief complaint on file.       History of Present Illness  Ray Ugarte is a 34 y.o. female   presents for post partum BP recheck. Reports giving birth  on 20 at 39w6d gestation. Denies headache, blurred vision, swelling, RUQ abdominal pain, CP, SOB.  Reports a few episodes of dizziness when laying down over the past week, denies near syncope or LOC. She admits to poor hydration, 3-4 glasses of water daily, she is currently breastfeeding. Expresses concern regarding infant's latch and concern for decreased supply. Her infant daughter, Kylie, was evaluated by the pediatrician on Monday with some weight loss, follow up scheduled with the pediatrician today.     BP Readings from Last 2 Encounters:   20 120/70   20 122/71          Review of patient's allergies indicates:   Allergen Reactions    Shellfish containing products Nausea And Vomiting      Only mussels       Past Medical History:   Diagnosis Date    Abnormal Pap smear of cervix ?    No procedures necessary. Repeat pap normal     Past Surgical History:   Procedure Laterality Date     SECTION  04/24/2015    x1    LASIK       OB History        3    Para   2    Term   2            AB   1    Living   2       SAB   1    TAB        Ectopic        Multiple   0    Live Births   2               Family History   Problem Relation Age of Onset    No Known Problems Mother     Cancer Father 75        Pancreatic    Breast cancer Neg Hx     Colon cancer Neg Hx     Ovarian cancer Neg Hx      Social History     Tobacco Use    Smoking status: Never Smoker    Smokeless tobacco: Never Used   Substance Use Topics    Alcohol use: No    Drug use: No       Current Outpatient Medications   Medication Sig    clotrimazole-betamethasone 1-0.05% (LOTRISONE) cream Apply topically 2 (two) times daily.    FLUARIX QUAD [id number], PF, 60 mcg (15 mcg  x 4)/0.5 mL Syrg ADM 0.5ML IM UTD    HYPROMELLOSE (SYSTANE GEL OPHT) Apply 1 drop to eye 2 (two) times daily. Left eye    ibuprofen (ADVIL,MOTRIN) 600 MG tablet Take 1 tablet (600 mg total) by mouth every 6 (six) hours.    multivitamin capsule Take 1 capsule by mouth once daily.     No current facility-administered medications for this visit.          Review of Systems:  Review of Systems   Constitutional: Negative for activity change, appetite change, chills, fatigue, fever and unexpected weight change.   HENT: Negative for nasal congestion and mouth sores.    Eyes: Negative for discharge and visual disturbance.   Respiratory: Negative for shortness of breath and wheezing.    Cardiovascular: Negative for chest pain, palpitations and leg swelling.        Dizziness   Gastrointestinal: Negative for abdominal pain, constipation, diarrhea, nausea, vomiting and reflux.   Endocrine: Negative for diabetes, hair loss, hot flashes, hyperthyroidism and hypothyroidism.   Genitourinary: Negative for bladder incontinence, decreased libido, dysmenorrhea, dyspareunia, dysuria, flank pain, frequency, genital sores, hematuria, hot flashes, menorrhagia, menstrual problem, pelvic pain, urgency, vaginal bleeding, vaginal discharge, vaginal pain, urinary incontinence, postcoital bleeding, postmenopausal bleeding, vaginal dryness and vaginal odor.   Musculoskeletal: Negative for arthralgias, back pain, joint swelling, leg pain and myalgias.   Integumentary:  Negative for rash, acne, hair changes, mole/lesion, breast mass, nipple discharge, breast skin changes and breast tenderness.   Neurological: Negative for vertigo, seizures, syncope, numbness and headaches.   Hematological: Negative for adenopathy. Does not bruise/bleed easily.   Psychiatric/Behavioral: Negative for depression and sleep disturbance. The patient is not nervous/anxious.    Breast: Negative for asymmetry, breast self exam, lump, mass, mastodynia, nipple discharge,  skin changes and tenderness       OBJECTIVE:     Physical Exam:  Physical Exam   Constitutional: She is oriented to person, place, and time. She appears well-developed and well-nourished. No distress.   HENT:   Head: Normocephalic and atraumatic.   Nose: Nose normal.   Mouth/Throat: Oropharynx is clear and moist.   Eyes: Pupils are equal, round, and reactive to light. Conjunctivae and EOM are normal.   Neck: Normal range of motion. Neck supple. No JVD present. No tracheal deviation present. No thyromegaly present.   Cardiovascular: Normal rate, regular rhythm, normal heart sounds and intact distal pulses. Exam reveals no gallop and no friction rub.   No murmur heard.  Pulmonary/Chest: Effort normal and breath sounds normal. No stridor. No respiratory distress. She has no wheezes. She has no rales. She exhibits no tenderness.   Abdominal: Soft. Bowel sounds are normal. She exhibits no distension and no mass. There is no tenderness. There is no rebound and no guarding. No hernia.   Genitourinary:   Genitourinary Comments: Deferred   Musculoskeletal: Normal range of motion. She exhibits no edema or tenderness.   Lymphadenopathy:     She has no cervical adenopathy.   Neurological: She is alert and oriented to person, place, and time. She displays normal reflexes. No sensory deficit. She exhibits normal muscle tone. Coordination normal.   Skin: Skin is warm and dry. Capillary refill takes less than 2 seconds. No rash noted. She is not diaphoretic. No erythema. No pallor.   Good skin turgor. Moist mucosa.   Psychiatric: She has a normal mood and affect. Her behavior is normal. Judgment and thought content normal.   Nursing note and vitals reviewed.        ASSESSMENT:       ICD-10-CM ICD-9-CM    1. Blood pressure check Z01.30 V81.1    2. Dizziness R42 780.4 CBC auto differential      Comprehensive metabolic panel      Protein / creatinine ratio, urine      Lactate dehydrogenase   3. Poor latch on, postpartum O92.79 676.84  Ambulatory referral/consult to Outpatient Lactation Services          Plan:      /70 HR 88 RR 18 T 98.6 F. Patient appears in no distress, nontoxic, and hemodynamically stable, will perform preE labs with assessment of CMP/CBC. Instructed to increase fluids, rest, with strict precautions to proceed to the OB ER is dizziness persist or worsens, verbalized understanding.    Consult with lactation for further evaluation of poor latch. Infant has scheduled follow up with Dr. Saunders, pediatrician today.    /70 and 110/70 in clinic. Given strict preE precautions.    Counseling time: 15 minutes      Shae Anne, MONAP-C

## 2020-02-21 ENCOUNTER — TELEPHONE (OUTPATIENT)
Dept: OBSTETRICS AND GYNECOLOGY | Facility: CLINIC | Age: 35
End: 2020-02-21

## 2020-02-21 ENCOUNTER — NURSE TRIAGE (OUTPATIENT)
Dept: ADMINISTRATIVE | Facility: CLINIC | Age: 35
End: 2020-02-21

## 2020-02-21 NOTE — TELEPHONE ENCOUNTER
Patient was instructed, per Dr. Colmenares to recheck her bp in 15 minutes after resting. If top number is >155 or bottom number is >105 or she is having sx, then she needs to go to the OB ED to be evaluated, due to her hx of PreE. Verbalized understanding.

## 2020-02-21 NOTE — TELEPHONE ENCOUNTER
Reason for Disposition   BP Systolic BP >= 140 OR Diastolic >= 90 and postpartum < 4 weeks    Additional Information   Negative: Sounds like a life-threatening emergency to the triager   Negative: Pregnant > 20 weeks and new hand or face swelling   Negative: Pregnant > 20 weeks and BP > 140/90   Negative: Systolic BP >= 160 OR Diastolic >= 100, and any cardiac or neurologic symptoms (e.g., chest pain, difficulty breathing, unsteady gait, blurred vision)   Negative: Patient sounds very sick or weak to the triager    Protocols used: HIGH BLOOD PRESSURE-A-OH    Pt gave birth a week ago bp 154/68. Pt denies any symptoms at present time. Care advice recommend pt come into the office now. Dr Khan office notified.  stated Dr Khan didn't have any appointments because they are leaving early today. Left a message for Dr Khan's office to call pt. No appointments available with pcp. Pt instructed to go to ER. Pt stated she believed the Md office was calling her and she got off of the phone. Please call and advise pt.

## 2020-02-24 ENCOUNTER — TELEPHONE (OUTPATIENT)
Dept: LACTATION | Facility: CLINIC | Age: 35
End: 2020-02-24

## 2020-02-24 ENCOUNTER — TELEPHONE (OUTPATIENT)
Dept: OBSTETRICS AND GYNECOLOGY | Facility: OTHER | Age: 35
End: 2020-02-24

## 2020-02-24 NOTE — TELEPHONE ENCOUNTER
Pt stated she and infant doing well after d/c. Pain well controlled. 6 week postpartum follow up scheduled with OB provider. Infant follow-up pediatrician visit completed. Infant breastfeeding, pt stated she would like to schedule an outpatient appointment; lactation warmline number provided. Discharge process rated 9/10; pt not ready when transport arrived. Transport left wheelchair and cart outside of room; when patient was ready, she and  loaded up the cart but couldn't find the transport employee so began to push everything down themselves.

## 2020-02-28 ENCOUNTER — TELEPHONE (OUTPATIENT)
Dept: OBSTETRICS AND GYNECOLOGY | Facility: CLINIC | Age: 35
End: 2020-02-28

## 2020-02-28 ENCOUNTER — TELEPHONE (OUTPATIENT)
Dept: LACTATION | Facility: CLINIC | Age: 35
End: 2020-02-28

## 2020-02-28 NOTE — TELEPHONE ENCOUNTER
Returned patient's phone call left on support line. Patient called requesting an OPC. States her nipples are very sore with bruises and small crack to L nipple at the base. Baby is 2 weeks old and is over birthweight. 7-3 nude at last weight check States she is exclusively nursing and has pumped once a day after the early morning feeding and obtains  4-5 oz and has been storing it. The baby has only had a bottle once when mother had to leave baby with another caregiver. She reports that the baby's latch sometimes feels like the suction on the pump but more often is uncomfortable. Reports initial latch score is 7 that decreases to a 3 on the R side and a 5 to the L breast where the crack is located. Discussed sore nipple Rx of Raymond Goldberg's APNO through her OB provider. Currently first available appointment is the week after next on 3/10/20. Discussed alternative resources. Patient prefers to call [facility] for availability next week. Discussed if she felt she needed to let nipples heal then she can can pump exclusively x 24 hours or more if needed and bottle feed EBM. Encouraged mother to call support line as needed for further questions or assistance.

## 2020-02-28 NOTE — TELEPHONE ENCOUNTER
----- Message from Cesar Singh sent at 2/28/2020  2:31 PM CST -----  Contact: KATE VALLADARES [[id number]]  Name of Who is Calling: KATE VALLADARES [[id number]]      What is the request in detail: Would like to speak with the staff in regards to a prescription for a crack nipple, Dr. Reyna compounding ointment, was told by lactation to ask physician. Please advise      Can the clinic reply by [facility]: no      What Number to [facility] Back [facility]: [phone]

## 2020-03-23 ENCOUNTER — OFFICE VISIT (OUTPATIENT)
Dept: OBSTETRICS AND GYNECOLOGY | Facility: CLINIC | Age: 35
End: 2020-03-23
Attending: OBSTETRICS & GYNECOLOGY
Payer: COMMERCIAL

## 2020-03-23 DIAGNOSIS — Z98.891 H/O CESAREAN SECTION: Primary | ICD-10-CM

## 2020-03-23 DIAGNOSIS — O34.219 VBAC, DELIVERED: ICD-10-CM

## 2020-03-23 PROBLEM — Z34.90 PREGNANCY WITH ONE FETUS, ANTEPARTUM: Status: RESOLVED | Noted: 2019-08-13 | Resolved: 2020-03-23

## 2020-03-23 PROBLEM — O26.893 PREGNANCY RELATED PELVIC PAIN IN THIRD TRIMESTER, ANTEPARTUM: Status: RESOLVED | Noted: 2020-01-31 | Resolved: 2020-03-23

## 2020-03-23 PROBLEM — R10.2 PREGNANCY RELATED PELVIC PAIN IN THIRD TRIMESTER, ANTEPARTUM: Status: RESOLVED | Noted: 2020-01-31 | Resolved: 2020-03-23

## 2020-03-23 PROCEDURE — 0503F POSTPARTUM CARE VISIT: CPT | Mod: 95,,, | Performed by: OBSTETRICS & GYNECOLOGY

## 2020-03-23 PROCEDURE — 0503F PR POSTPARTUM CARE VISIT: ICD-10-PCS | Mod: 95,,, | Performed by: OBSTETRICS & GYNECOLOGY

## 2020-03-24 NOTE — PROGRESS NOTES
The patient location is: Her Home, [place], [place].  The chief complaint leading to consultation is: postpartum follow up.  Visit type: Virtual visit with synchronous audio and video  Total time spent with patient: 30m  Each patient to whom he or she provides medical services by telemedicine is:  (1) informed of the relationship between the physician and patient and the respective role of any other health care provider with respect to management of the patient; and (2) notified that he or she may decline to receive medical services by telemedicine and may withdraw from such care at any time.    Notes: see below    Ray Ugarte is a 34 y.o.  who presents for a postpartum visit.  She is status post   6 weeks ago.  Her hospitalization was not complicated.  She is breastfeeding.  She desires OCPSfor contraception.  She denies abdominal pain signs and symptoms of postpartum depression. She reports that she feels anxious, is concerned about recent coronavirus and is having some difficulty with cluster feeding in her infant.  Reports some vaginal burning with urination but reports that overall her pain is improved.  Questions about exercising and return to activity.     Her last pap was negative on 2017     Past Medical History:   Diagnosis Date    Abnormal Pap smear of cervix ?    No procedures necessary. Repeat pap normal       Objective:     LMP 2019 (Approximate)     General: healthy, alert, no distress  Psych: BEHAVIOR: cooperative, MOOD: appropriate, SPEECH: {normal, PATIENT STRESORS: health problems concerned about COVID and stresses on family as older daughter is out of school,  will start working from home soon which should help.  Assessment:     H/O  section    , delivered        Plan:     1. Return to clinic in 3-6 months for annual exam

## 2020-04-06 VITALS
BODY MASS INDEX: 29.58 KG/M2 | WEIGHT: 183.25 LBS | DIASTOLIC BLOOD PRESSURE: 66 MMHG | SYSTOLIC BLOOD PRESSURE: 120 MMHG

## 2020-04-06 NOTE — PROGRESS NOTES
Patient seen and examined.  No complaints, denies VB/LOF/Ctxns, reports good fetal movement. Precautions for Bleeding/ ROM/ Decreased fetal movement/ Pre-E reviewed. Still interested in TOLAC  F/U scheduled 1 weeks

## 2020-04-22 DIAGNOSIS — R21 RASH: ICD-10-CM

## 2020-04-23 RX ORDER — CLOTRIMAZOLE AND BETAMETHASONE DIPROPIONATE 10; .64 MG/G; MG/G
CREAM TOPICAL 2 TIMES DAILY
Qty: 15 G | Refills: 0 | Status: SHIPPED | OUTPATIENT
Start: 2020-04-23 | End: 2022-06-15 | Stop reason: SDUPTHER

## 2020-04-28 ENCOUNTER — PATIENT MESSAGE (OUTPATIENT)
Dept: OBSTETRICS AND GYNECOLOGY | Facility: CLINIC | Age: 35
End: 2020-04-28

## 2020-04-30 ENCOUNTER — PATIENT MESSAGE (OUTPATIENT)
Dept: OBSTETRICS AND GYNECOLOGY | Facility: CLINIC | Age: 35
End: 2020-04-30

## 2020-05-07 ENCOUNTER — OFFICE VISIT (OUTPATIENT)
Dept: OBSTETRICS AND GYNECOLOGY | Facility: CLINIC | Age: 35
End: 2020-05-07
Attending: OBSTETRICS & GYNECOLOGY
Payer: COMMERCIAL

## 2020-05-07 VITALS
WEIGHT: 158.06 LBS | SYSTOLIC BLOOD PRESSURE: 124 MMHG | BODY MASS INDEX: 25.53 KG/M2 | DIASTOLIC BLOOD PRESSURE: 62 MMHG

## 2020-05-07 DIAGNOSIS — Z20.822 EXPOSURE TO COVID-19 VIRUS: ICD-10-CM

## 2020-05-07 PROCEDURE — 87624 HPV HI-RISK TYP POOLED RSLT: CPT

## 2020-05-07 PROCEDURE — 3008F PR BODY MASS INDEX (BMI) DOCUMENTED: ICD-10-PCS | Mod: CPTII,S$GLB,, | Performed by: OBSTETRICS & GYNECOLOGY

## 2020-05-07 PROCEDURE — 88175 CYTOPATH C/V AUTO FLUID REDO: CPT

## 2020-05-07 PROCEDURE — 99999 PR PBB SHADOW E&M-EST. PATIENT-LVL III: CPT | Mod: PBBFAC,,, | Performed by: OBSTETRICS & GYNECOLOGY

## 2020-05-07 PROCEDURE — 3008F BODY MASS INDEX DOCD: CPT | Mod: CPTII,S$GLB,, | Performed by: OBSTETRICS & GYNECOLOGY

## 2020-05-07 PROCEDURE — 99213 OFFICE O/P EST LOW 20 MIN: CPT | Mod: 24,S$GLB,, | Performed by: OBSTETRICS & GYNECOLOGY

## 2020-05-07 PROCEDURE — 99999 PR PBB SHADOW E&M-EST. PATIENT-LVL III: ICD-10-PCS | Mod: PBBFAC,,, | Performed by: OBSTETRICS & GYNECOLOGY

## 2020-05-07 PROCEDURE — 99213 PR OFFICE/OUTPT VISIT, EST, LEVL III, 20-29 MIN: ICD-10-PCS | Mod: 24,S$GLB,, | Performed by: OBSTETRICS & GYNECOLOGY

## 2020-05-07 RX ORDER — ACETAMINOPHEN AND CODEINE PHOSPHATE 120; 12 MG/5ML; MG/5ML
1 SOLUTION ORAL DAILY
Qty: 90 TABLET | Refills: 3 | Status: SHIPPED | OUTPATIENT
Start: 2020-05-07 | End: 2021-04-07

## 2020-05-07 NOTE — PROGRESS NOTES
Ray Ugarte is a 34 y.o.  who presents for a postpartum visit.  She is status post   20.  Her hospitalization was not complicated.  She is breastfeeding.  She desires OCPSfor contraception.  She reports some vaginal curning with urination occasionally, denies vaginal bleeding.  No nipple pain or issues.  Reports normal, baseline anxiety.  Her last pap was normal on 2017     Past Medical History:   Diagnosis Date    Abnormal Pap smear of cervix ?    No procedures necessary. Repeat pap normal       Objective:     /62 (BP Location: Right arm, Patient Position: Sitting)   Wt 71.7 kg (158 lb 1.1 oz)   Breastfeeding? Yes   BMI 25.53 kg/m²     General: healthy, no distress, cooperative, flat affect, per baseline  Abdomen: Normal, benign. and no masses, hepatosplenomegaly, no hernias  External Genitalia: normal, well-healed, without lesions or masses  Vagina: normal, granulation tissue noted at introitus and inside at posterior aspect of vagina. Treated x 2 with silver nitrate.  Cervix: normal, well-healed, without lesions  Uterus: normal size, well involuted, firm, non-tender  Adnexa: no masses palpable and nontender  Psych: BEHAVIOR: cooperative, MOOD: flat affect, SPEECH: normal, THOUGHT CONTENT: appropriate, PATIENT STRESORS: global pandemic, return to work, nanny situation, etc...  Assessment:     Postpartum care and examination  -     Liquid-Based Pap Smear, Screening  -     HPV High Risk Genotypes, PCR  -     COVID-19 (SARS CoV-2) IgG Antibody; Future; Expected date: 2020    Exposure to Covid-19 Virus  -     COVID-19 (SARS CoV-2) IgG Antibody; Future; Expected date: 2020    Other orders  -     norethindrone (ORTHO MICRONOR) 0.35 mg tablet; Take 1 tablet (0.35 mg total) by mouth once daily.  Dispense: 90 tablet; Refill: 3        Plan:     1. Return to clinic in 3-6 months for annual exam

## 2020-05-11 LAB
FINAL PATHOLOGIC DIAGNOSIS: NORMAL
Lab: NORMAL

## 2020-05-12 ENCOUNTER — PATIENT MESSAGE (OUTPATIENT)
Dept: OBSTETRICS AND GYNECOLOGY | Facility: CLINIC | Age: 35
End: 2020-05-12

## 2020-05-14 LAB
HPV HR 12 DNA SPEC QL NAA+PROBE: NEGATIVE
HPV16 AG SPEC QL: NEGATIVE
HPV18 DNA SPEC QL NAA+PROBE: NEGATIVE

## 2020-05-21 ENCOUNTER — TELEPHONE (OUTPATIENT)
Dept: OBSTETRICS AND GYNECOLOGY | Facility: CLINIC | Age: 35
End: 2020-05-21

## 2020-05-21 NOTE — TELEPHONE ENCOUNTER
----- Message from Alexa Murry sent at 5/21/2020  8:55 AM CDT -----  Contact: joshua /francisco ibarra /[phone]    Name of Who is Calling:     What is the request in detail: request call back in reference to verify fax was received and faxed back  Please contact to further discuss and advise      Can the clinic reply by [facility]: no     What Number to Call Back if not in [facility]:  joshua /francisco ibarra /[phone]         Spoke with Joshua, she wanted to confirm we received the fax form to get filled out. Inform Joshua, I will be on the look out for them.

## 2020-07-13 ENCOUNTER — OFFICE VISIT (OUTPATIENT)
Dept: DERMATOLOGY | Facility: CLINIC | Age: 35
End: 2020-07-13
Payer: COMMERCIAL

## 2020-07-13 DIAGNOSIS — L30.1 DYSHIDROTIC ECZEMA: Primary | ICD-10-CM

## 2020-07-13 DIAGNOSIS — L81.4 SOLAR LENTIGO: ICD-10-CM

## 2020-07-13 PROCEDURE — 99202 PR OFFICE/OUTPT VISIT, NEW, LEVL II, 15-29 MIN: ICD-10-PCS | Mod: 95,,, | Performed by: DERMATOLOGY

## 2020-07-13 PROCEDURE — 99202 OFFICE O/P NEW SF 15 MIN: CPT | Mod: 95,,, | Performed by: DERMATOLOGY

## 2020-07-13 RX ORDER — TRETINOIN 0.25 MG/G
CREAM TOPICAL NIGHTLY
Qty: 20 G | Refills: 3 | Status: SHIPPED | OUTPATIENT
Start: 2020-07-13 | End: 2021-05-07

## 2020-07-13 RX ORDER — CLOBETASOL PROPIONATE 0.5 MG/G
CREAM TOPICAL 2 TIMES DAILY
Qty: 45 G | Refills: 3 | Status: SHIPPED | OUTPATIENT
Start: 2020-07-13 | End: 2022-09-28 | Stop reason: SDUPTHER

## 2020-07-13 NOTE — PROGRESS NOTES
The patient location is: home  The chief complaint leading to consultation is: rash    Visit type: audiovisual    Face to Face time with patient: 10  15 minutes of total time spent on the encounter, which includes face to face time and non-face to face time preparing to see the patient (eg, review of tests), Obtaining and/or reviewing separately obtained history, Documenting clinical information in the electronic or other health record, Independently interpreting results (not separately reported) and communicating results to the patient/family/caregiver, or Care coordination (not separately reported).         Each patient to whom he or she provides medical services by telemedicine is:  (1) informed of the relationship between the physician and patient and the respective role of any other health care provider with respect to management of the patient; and (2) notified that he or she may decline to receive medical services by telemedicine and may withdraw from such care at any time.    Notes:     Subjective:       Patient ID:  Ray Ugarte is a 34 y.o. female who presents for   Chief Complaint   Patient presents with    Rash     Patient presents today via video visit with c/o:    1) rash  Bilat. Hands, right>left  Present intermittently for years  Itch, blisters  Was Rx clotrimazole-betamethasone which helps    2) sun spots  Left cheek  Constant  No previous treatment      Review of Systems   Constitutional: Negative for malaise.   Skin: Positive for itching and rash.        Objective:    Physical Exam   Constitutional: She appears well-developed and well-nourished. No distress.   Neurological: She is alert and oriented to person, place, and time.   Psychiatric: She has a normal mood and affect.   Skin:   Areas Examined (abnormalities noted in diagram):   Head / Face Inspection Performed  Neck Inspection Performed  RUE Inspected  LUE Inspection Performed  Nails and Digits Inspection Performed                   Diagram Legend     Erythematous scaling macule/papule c/w actinic keratosis       Vascular papule c/w angioma      Pigmented verrucoid papule/plaque c/w seborrheic keratosis      Yellow umbilicated papule c/w sebaceous hyperplasia      Irregularly shaped tan macule c/w lentigo     1-2 mm smooth white papules consistent with Milia      Movable subcutaneous cyst with punctum c/w epidermal inclusion cyst      Subcutaneous movable cyst c/w pilar cyst      Firm pink to brown papule c/w dermatofibroma      Pedunculated fleshy papule(s) c/w skin tag(s)      Evenly pigmented macule c/w junctional nevus     Mildly variegated pigmented, slightly irregular-bordered macule c/w mildly atypical nevus      Flesh colored to evenly pigmented papule c/w intradermal nevus       Pink pearly papule/plaque c/w basal cell carcinoma      Erythematous hyperkeratotic cursted plaque c/w SCC      Surgical scar with no sign of skin cancer recurrence      Open and closed comedones      Inflammatory papules and pustules      Verrucoid papule consistent consistent with wart     Erythematous eczematous patches and plaques     Dystrophic onycholytic nail with subungual debris c/w onychomycosis     Umbilicated papule    Erythematous-base heme-crusted tan verrucoid plaque consistent with inflamed seborrheic keratosis     Erythematous Silvery Scaling Plaque c/w Psoriasis     See annotation      Assessment / Plan:        Dyshidrotic eczema  - counseled on gentle skin care and trigger avoidance  - avoid harsh cleansers, alcohol containing sanitizers  - recommend OTC bland emollients daily  START:  -     clobetasoL (TEMOVATE) 0.05 % cream; Apply topically 2 (two) times daily. For hand dermatitis  Dispense: 45 g; Refill: 3    Solar lentigo in the setting of photodamage  -     tretinoin (RETIN-A) 0.025 % cream; Apply topically nightly. Apply pea sized amount to entire face.  Dispense: 20 g; Refill: 3    Recommend light cryo or laser treatment if patient  elects in the future  Recommend daily sun protection with SPF 30 or greater broad spectrum sunscreen to prevent darkening         F/u if symptoms worsen or fail to improve

## 2020-07-16 ENCOUNTER — TELEPHONE (OUTPATIENT)
Dept: DERMATOLOGY | Facility: CLINIC | Age: 35
End: 2020-07-16

## 2020-07-16 NOTE — TELEPHONE ENCOUNTER
PA denied for Tretinoin 0.025% cream per pt insurance.  Denial faxed to pt pharmacy with instructions for pharmacy to inform pt she can use GoodRx coupon and pt cash price for med.

## 2020-10-05 ENCOUNTER — PATIENT MESSAGE (OUTPATIENT)
Dept: ADMINISTRATIVE | Facility: HOSPITAL | Age: 35
End: 2020-10-05

## 2021-01-04 ENCOUNTER — PATIENT MESSAGE (OUTPATIENT)
Dept: ADMINISTRATIVE | Facility: HOSPITAL | Age: 36
End: 2021-01-04

## 2021-04-05 ENCOUNTER — PATIENT MESSAGE (OUTPATIENT)
Dept: ADMINISTRATIVE | Facility: HOSPITAL | Age: 36
End: 2021-04-05

## 2021-05-07 ENCOUNTER — OFFICE VISIT (OUTPATIENT)
Dept: OBSTETRICS AND GYNECOLOGY | Facility: CLINIC | Age: 36
End: 2021-05-07
Payer: COMMERCIAL

## 2021-05-07 VITALS
DIASTOLIC BLOOD PRESSURE: 76 MMHG | SYSTOLIC BLOOD PRESSURE: 118 MMHG | BODY MASS INDEX: 23.07 KG/M2 | HEIGHT: 65 IN | WEIGHT: 138.44 LBS

## 2021-05-07 DIAGNOSIS — Z01.419 ENCOUNTER FOR ANNUAL ROUTINE GYNECOLOGICAL EXAMINATION: Primary | ICD-10-CM

## 2021-05-07 DIAGNOSIS — N89.8 VAGINAL ITCHING: ICD-10-CM

## 2021-05-07 DIAGNOSIS — R10.2 VAGINAL PAIN: ICD-10-CM

## 2021-05-07 PROCEDURE — 99999 PR PBB SHADOW E&M-EST. PATIENT-LVL III: CPT | Mod: PBBFAC,,, | Performed by: OBSTETRICS & GYNECOLOGY

## 2021-05-07 PROCEDURE — 87481 CANDIDA DNA AMP PROBE: CPT | Mod: 59 | Performed by: OBSTETRICS & GYNECOLOGY

## 2021-05-07 PROCEDURE — 3008F BODY MASS INDEX DOCD: CPT | Mod: CPTII,S$GLB,, | Performed by: OBSTETRICS & GYNECOLOGY

## 2021-05-07 PROCEDURE — 1126F PR PAIN SEVERITY QUANTIFIED, NO PAIN PRESENT: ICD-10-PCS | Mod: S$GLB,,, | Performed by: OBSTETRICS & GYNECOLOGY

## 2021-05-07 PROCEDURE — 87591 N.GONORRHOEAE DNA AMP PROB: CPT | Performed by: OBSTETRICS & GYNECOLOGY

## 2021-05-07 PROCEDURE — 87491 CHLMYD TRACH DNA AMP PROBE: CPT | Performed by: OBSTETRICS & GYNECOLOGY

## 2021-05-07 PROCEDURE — 99999 PR PBB SHADOW E&M-EST. PATIENT-LVL III: ICD-10-PCS | Mod: PBBFAC,,, | Performed by: OBSTETRICS & GYNECOLOGY

## 2021-05-07 PROCEDURE — 3008F PR BODY MASS INDEX (BMI) DOCUMENTED: ICD-10-PCS | Mod: CPTII,S$GLB,, | Performed by: OBSTETRICS & GYNECOLOGY

## 2021-05-07 PROCEDURE — 1126F AMNT PAIN NOTED NONE PRSNT: CPT | Mod: S$GLB,,, | Performed by: OBSTETRICS & GYNECOLOGY

## 2021-05-07 PROCEDURE — 99395 PREV VISIT EST AGE 18-39: CPT | Mod: 25,S$GLB,, | Performed by: OBSTETRICS & GYNECOLOGY

## 2021-05-07 PROCEDURE — 99395 PR PREVENTIVE VISIT,EST,18-39: ICD-10-PCS | Mod: 25,S$GLB,, | Performed by: OBSTETRICS & GYNECOLOGY

## 2021-05-07 RX ORDER — FLUCONAZOLE 150 MG/1
150 TABLET ORAL ONCE
Qty: 1 TABLET | Refills: 0 | Status: SHIPPED | OUTPATIENT
Start: 2021-05-07 | End: 2021-05-07

## 2021-05-10 LAB
BACTERIAL VAGINOSIS DNA: NEGATIVE
CANDIDA GLABRATA DNA: NEGATIVE
CANDIDA KRUSEI DNA: NEGATIVE
CANDIDA RRNA VAG QL PROBE: NEGATIVE
T VAGINALIS RRNA GENITAL QL PROBE: NEGATIVE

## 2021-05-11 LAB
C TRACH DNA SPEC QL NAA+PROBE: NOT DETECTED
N GONORRHOEA DNA SPEC QL NAA+PROBE: NOT DETECTED

## 2022-06-14 ENCOUNTER — TELEPHONE (OUTPATIENT)
Dept: OBSTETRICS AND GYNECOLOGY | Facility: CLINIC | Age: 37
End: 2022-06-14
Payer: COMMERCIAL

## 2022-06-14 NOTE — TELEPHONE ENCOUNTER
----- Message from Geena Hoskins sent at 6/14/2022  1:16 PM CDT -----  Regarding: Reschedule Appt  Contact: Patient  Patient is requesting a call back to reschedule appt on 06/15/2022   Patient stated her daughter is home from  and not sure if she is allowed to bring her to appt with her   If not, patient would like to reschedule   Attempted to reschedule first available was not until 09/02/2022   Please Assist     Patient can be reached at [phone]

## 2022-06-15 ENCOUNTER — OFFICE VISIT (OUTPATIENT)
Dept: OBSTETRICS AND GYNECOLOGY | Facility: CLINIC | Age: 37
End: 2022-06-15
Attending: OBSTETRICS & GYNECOLOGY
Payer: COMMERCIAL

## 2022-06-15 VITALS
DIASTOLIC BLOOD PRESSURE: 70 MMHG | SYSTOLIC BLOOD PRESSURE: 114 MMHG | BODY MASS INDEX: 22.64 KG/M2 | WEIGHT: 140.88 LBS | HEIGHT: 66 IN

## 2022-06-15 DIAGNOSIS — Z30.9 ENCOUNTER FOR CONTRACEPTIVE MANAGEMENT, UNSPECIFIED TYPE: ICD-10-CM

## 2022-06-15 DIAGNOSIS — R21 RASH: ICD-10-CM

## 2022-06-15 DIAGNOSIS — Z01.419 WELL WOMAN EXAM WITH ROUTINE GYNECOLOGICAL EXAM: Primary | ICD-10-CM

## 2022-06-15 PROCEDURE — 1160F PR REVIEW ALL MEDS BY PRESCRIBER/CLIN PHARMACIST DOCUMENTED: ICD-10-PCS | Mod: CPTII,S$GLB,, | Performed by: NURSE PRACTITIONER

## 2022-06-15 PROCEDURE — 3078F DIAST BP <80 MM HG: CPT | Mod: CPTII,S$GLB,, | Performed by: NURSE PRACTITIONER

## 2022-06-15 PROCEDURE — 1159F PR MEDICATION LIST DOCUMENTED IN MEDICAL RECORD: ICD-10-PCS | Mod: CPTII,S$GLB,, | Performed by: NURSE PRACTITIONER

## 2022-06-15 PROCEDURE — 3008F BODY MASS INDEX DOCD: CPT | Mod: CPTII,S$GLB,, | Performed by: NURSE PRACTITIONER

## 2022-06-15 PROCEDURE — 99999 PR PBB SHADOW E&M-EST. PATIENT-LVL III: CPT | Mod: PBBFAC,,, | Performed by: NURSE PRACTITIONER

## 2022-06-15 PROCEDURE — 99395 PR PREVENTIVE VISIT,EST,18-39: ICD-10-PCS | Mod: S$GLB,,, | Performed by: NURSE PRACTITIONER

## 2022-06-15 PROCEDURE — 3008F PR BODY MASS INDEX (BMI) DOCUMENTED: ICD-10-PCS | Mod: CPTII,S$GLB,, | Performed by: NURSE PRACTITIONER

## 2022-06-15 PROCEDURE — 1160F RVW MEDS BY RX/DR IN RCRD: CPT | Mod: CPTII,S$GLB,, | Performed by: NURSE PRACTITIONER

## 2022-06-15 PROCEDURE — 1159F MED LIST DOCD IN RCRD: CPT | Mod: CPTII,S$GLB,, | Performed by: NURSE PRACTITIONER

## 2022-06-15 PROCEDURE — 3074F SYST BP LT 130 MM HG: CPT | Mod: CPTII,S$GLB,, | Performed by: NURSE PRACTITIONER

## 2022-06-15 PROCEDURE — 99395 PREV VISIT EST AGE 18-39: CPT | Mod: S$GLB,,, | Performed by: NURSE PRACTITIONER

## 2022-06-15 PROCEDURE — 99999 PR PBB SHADOW E&M-EST. PATIENT-LVL III: ICD-10-PCS | Mod: PBBFAC,,, | Performed by: NURSE PRACTITIONER

## 2022-06-15 PROCEDURE — 3074F PR MOST RECENT SYSTOLIC BLOOD PRESSURE < 130 MM HG: ICD-10-PCS | Mod: CPTII,S$GLB,, | Performed by: NURSE PRACTITIONER

## 2022-06-15 PROCEDURE — 3078F PR MOST RECENT DIASTOLIC BLOOD PRESSURE < 80 MM HG: ICD-10-PCS | Mod: CPTII,S$GLB,, | Performed by: NURSE PRACTITIONER

## 2022-06-15 RX ORDER — NORGESTIMATE AND ETHINYL ESTRADIOL 0.25-0.035
1 KIT ORAL DAILY
Qty: 84 TABLET | Refills: 3 | Status: SHIPPED | OUTPATIENT
Start: 2022-06-15 | End: 2023-04-11

## 2022-06-15 RX ORDER — CLOTRIMAZOLE AND BETAMETHASONE DIPROPIONATE 10; .64 MG/G; MG/G
CREAM TOPICAL 2 TIMES DAILY
Qty: 15 G | Refills: 0 | Status: SHIPPED | OUTPATIENT
Start: 2022-06-15 | End: 2022-09-28

## 2022-06-15 NOTE — PROGRESS NOTES
CC: Annual  HPI: Pt is a 36 y.o.  female who presents for routine annual exam. Pt of Dr. Khan- switched to see me today due to delivery. She uses pops for contraception. She does not want STD screening. Youngest is 3 yo- no longer breast feeding. Started pills after delivery and still on them- having irregular periods for the past year. Ready to switch to a rabia. Requesting refill of Lotrisone.     FH:   Breast cancer: none  Colon cancer: none  Ovarian cancer: none  Uterine cancer: none      Last pap smear:  nilm, hpv negative  History of abnormal pap smears: no  Colonoscopy: na  DEXA: na  Mammogram: na  STD history: no  Birth control: pops  OB history:   Tobacco use: no    ROS:  GENERAL: Feeling well overall. Denies fever or chills.   SKIN: Denies rash or lesions.   HEAD: Denies head injury or headache.   NODES: Denies enlarged lymph nodes.   CHEST: Denies chest pain or shortness of breath.   CARDIOVASCULAR: Denies palpitations or left sided chest pain.   ABDOMEN: No abdominal pain, constipation, diarrhea, nausea, vomiting or rectal bleeding.   URINARY: No dysuria, hematuria, or burning on urination.  REPRODUCTIVE: See HPI.   BREASTS: Denies pain, lumps, or nipple discharge.   HEMATOLOGIC: No easy bruisability or excessive bleeding.   MUSCULOSKELETAL: Denies joint pain or swelling.   NEUROLOGIC: Denies syncope or weakness.   PSYCHIATRIC: Denies depression, anxiety or mood swings.    PE:   APPEARANCE: Well nourished, well developed, White female in no acute distress.  NODES: no cervical, supraclavicular, or inguinal lymphadenopathy  BREASTS: Symmetrical, no skin changes or visible lesions. No palpable masses, nipple discharge or adenopathy bilaterally.  ABDOMEN: Soft. No tenderness or masses. No distention. No hernias palpated. No CVA tenderness.  VULVA: No lesions. Normal external female genitalia.  URETHRAL MEATUS: Normal size and location, no lesions, no prolapse.  URETHRA: No masses, tenderness, or  prolapse.  VAGINA: Moist. No lesions or lacerations noted. No abnormal discharge present. No odor present.   CERVIX: No lesions or discharge. No cervical motion tenderness.   UTERUS: Normal size, regular shape, mobile, non-tender.  ADNEXA: No tenderness. No fullness or masses palpated in the adnexal regions.   ANUS PERINEUM: Normal.      Diagnosis:  1. Well woman exam with routine gynecological exam    2. Encounter for contraceptive management, unspecified type    3. Rash        Plan:     Orders Placed This Encounter    clotrimazole-betamethasone 1-0.05% (LOTRISONE) cream    norgestimate-ethinyl estradioL (ORTHO-CYCLEN) 0.25-35 mg-mcg per tablet       Patient was counseled today on the new ACS guidelines for cervical cytology screening as well as the current recommendations for breast cancer screening. She was counseled to follow up with her PCP for other routine health maintenance. Counseling session lasted approximately 10 minutes, and all her questions were answered.  For women over the age of 65, you can stop having cervical cancer screenings if you have never had abnormal cervical cells or cervical cancer, and youve had three negative Pap tests in a row. (You also can stop screening if youve had two negative Pap and HPV tests in a row in the past 10 years, with at least one test in the past 5 years.),    Follow-up with me in 1 year for routine exam; pap in 1 years.

## 2022-07-14 ENCOUNTER — OFFICE VISIT (OUTPATIENT)
Dept: FAMILY MEDICINE | Facility: CLINIC | Age: 37
End: 2022-07-14
Attending: FAMILY MEDICINE
Payer: COMMERCIAL

## 2022-07-14 ENCOUNTER — HOSPITAL ENCOUNTER (OUTPATIENT)
Dept: RADIOLOGY | Facility: OTHER | Age: 37
Discharge: HOME | End: 2022-07-14
Attending: FAMILY MEDICINE
Payer: COMMERCIAL

## 2022-07-14 DIAGNOSIS — R93.89 ABNORMAL CXR: ICD-10-CM

## 2022-07-14 DIAGNOSIS — U07.1 COVID: ICD-10-CM

## 2022-07-14 DIAGNOSIS — U07.1 COVID: Primary | ICD-10-CM

## 2022-07-14 PROCEDURE — 71046 XR CHEST PA AND LATERAL: ICD-10-PCS | Mod: 26,,, | Performed by: INTERNAL MEDICINE

## 2022-07-14 PROCEDURE — 3008F PR BODY MASS INDEX (BMI) DOCUMENTED: ICD-10-PCS | Mod: CPTII,95,, | Performed by: FAMILY MEDICINE

## 2022-07-14 PROCEDURE — 99203 PR OFFICE/OUTPT VISIT, NEW, LEVL III, 30-44 MIN: ICD-10-PCS | Mod: 95,,, | Performed by: FAMILY MEDICINE

## 2022-07-14 PROCEDURE — 71046 X-RAY EXAM CHEST 2 VIEWS: CPT | Mod: TC,FY

## 2022-07-14 PROCEDURE — 99203 OFFICE O/P NEW LOW 30 MIN: CPT | Mod: 95,,, | Performed by: FAMILY MEDICINE

## 2022-07-14 PROCEDURE — 3008F BODY MASS INDEX DOCD: CPT | Mod: CPTII,95,, | Performed by: FAMILY MEDICINE

## 2022-07-14 PROCEDURE — 71046 X-RAY EXAM CHEST 2 VIEWS: CPT | Mod: 26,,, | Performed by: INTERNAL MEDICINE

## 2022-07-14 RX ORDER — METHYLPREDNISOLONE 4 MG/1
TABLET ORAL
Qty: 1 EACH | Refills: 0 | Status: SHIPPED | OUTPATIENT
Start: 2022-07-14 | End: 2022-08-04

## 2022-07-14 RX ORDER — LEVOCETIRIZINE DIHYDROCHLORIDE 5 MG/1
5 TABLET, FILM COATED ORAL NIGHTLY
Qty: 30 TABLET | Refills: 3 | Status: SHIPPED | OUTPATIENT
Start: 2022-07-14 | End: 2022-09-28

## 2022-07-14 RX ORDER — AZITHROMYCIN 250 MG/1
TABLET, FILM COATED ORAL
Qty: 6 TABLET | Refills: 0 | Status: SHIPPED | OUTPATIENT
Start: 2022-07-14 | End: 2022-07-19

## 2022-07-15 ENCOUNTER — PATIENT MESSAGE (OUTPATIENT)
Dept: FAMILY MEDICINE | Facility: CLINIC | Age: 37
End: 2022-07-15
Payer: COMMERCIAL

## 2022-07-15 ENCOUNTER — TELEPHONE (OUTPATIENT)
Dept: FAMILY MEDICINE | Facility: CLINIC | Age: 37
End: 2022-07-15
Payer: COMMERCIAL

## 2022-07-15 VITALS — TEMPERATURE: 99 F | BODY MASS INDEX: 22.5 KG/M2 | RESPIRATION RATE: 16 BRPM | WEIGHT: 140 LBS | HEIGHT: 66 IN

## 2022-07-15 NOTE — TELEPHONE ENCOUNTER
Advised pt to take prescribed medications due to abnormal xray results and to repeat imaging in 3 weeks. Pt verbalized understanding.

## 2022-07-15 NOTE — PROGRESS NOTES
"  The patient location is: home  The chief complaint leading to consultation is: cough    Visit type: {TELE AUDIOVISUAL    Face to Face time with patient: 20 minutes of total time spent on the encounter, which includes face to face time and non-face to face time preparing to see the patient (eg, review of tests), Obtaining and/or reviewing separately obtained history, Documenting clinical information in the electronic or other health record, Independently interpreting results (not separately reported) and communicating results to the patient/family/caregiver, or Care coordination (not separately reported).         Each patient to whom he or she provides medical services by telemedicine is:  (1) informed of the relationship between the physician and patient and the respective role of any other health care provider with respect to management of the patient; and (2) notified that he or she may decline to receive medical services by telemedicine and may withdraw from such care at any time.    Notes:   Subjective:       Patient ID: Ray Ugarte is a 36 y.o. female.    Chief Complaint: COVID-19 Concerns    HPI   Pt had covid still coughing no n/v/f/c/d/c no sore throat pos cough with chest congestion cough productive at times  Pt is taking otc with no improvement  Review of Systems   Constitutional: Negative for chills and fever.   HENT: Positive for postnasal drip and rhinorrhea. Negative for congestion, ear pain and sore throat.    Respiratory: Positive for cough and wheezing. Negative for shortness of breath.    Cardiovascular: Positive for chest pain.   Musculoskeletal: Negative for myalgias.   Skin: Negative for rash.   Allergic/Immunologic: Negative for environmental allergies.   Neurological: Negative for headaches.       Objective:     Temp 98.6 °F (37 °C)   Resp 16   Ht 5' 6" (1.676 m)   Wt 63.5 kg (140 lb)   BMI 22.60 kg/m²       Physical Exam  Constitutional:       Appearance: Normal appearance. She " "is not ill-appearing.   HENT:      Head: Normocephalic and atraumatic.      Nose: Nose normal.   Pulmonary:      Effort: Pulmonary effort is normal. No respiratory distress.   Neurological:      General: No focal deficit present.      Mental Status: She is alert and oriented to person, place, and time.      Cranial Nerves: No cranial nerve deficit.      Coordination: Coordination normal.   Psychiatric:         Mood and Affect: Mood normal.         Behavior: Behavior normal.         Thought Content: Thought content normal.         Judgment: Judgment normal.         Assessment:       1. COVID    2. Abnormal CXR        Plan:        orders cxr  Start medrol dose pack, zpack if consolidation  Rest  Increase fluids  rtc prn and for annual exam     "This note will not be shared with the patient."   "

## 2022-07-15 NOTE — TELEPHONE ENCOUNTER
----- Message from Haylee Mccoy MD sent at 7/15/2022  8:02 AM CDT -----  Please let pt know her chest x-ray is abnormal please have her complete her medrol dose pack and her zpack and repeat the chest imaging in 2-4 weeks

## 2022-07-29 ENCOUNTER — HOSPITAL ENCOUNTER (OUTPATIENT)
Dept: RADIOLOGY | Facility: HOSPITAL | Age: 37
Discharge: HOME | End: 2022-07-29
Attending: FAMILY MEDICINE
Payer: COMMERCIAL

## 2022-07-29 DIAGNOSIS — U07.1 COVID: ICD-10-CM

## 2022-07-29 DIAGNOSIS — R93.89 ABNORMAL CXR: ICD-10-CM

## 2022-07-29 PROCEDURE — 71046 XR CHEST PA AND LATERAL: ICD-10-PCS | Mod: 26,,, | Performed by: INTERNAL MEDICINE

## 2022-07-29 PROCEDURE — 71046 X-RAY EXAM CHEST 2 VIEWS: CPT | Mod: 26,,, | Performed by: INTERNAL MEDICINE

## 2022-07-29 PROCEDURE — 71046 X-RAY EXAM CHEST 2 VIEWS: CPT | Mod: TC,PO

## 2022-09-28 ENCOUNTER — OFFICE VISIT (OUTPATIENT)
Dept: FAMILY MEDICINE | Facility: CLINIC | Age: 37
End: 2022-09-28
Attending: FAMILY MEDICINE
Payer: COMMERCIAL

## 2022-09-28 ENCOUNTER — HOSPITAL ENCOUNTER (OUTPATIENT)
Dept: RADIOLOGY | Facility: OTHER | Age: 37
Discharge: HOME | End: 2022-09-28
Attending: FAMILY MEDICINE
Payer: COMMERCIAL

## 2022-09-28 VITALS
SYSTOLIC BLOOD PRESSURE: 122 MMHG | DIASTOLIC BLOOD PRESSURE: 74 MMHG | HEIGHT: 66 IN | HEART RATE: 65 BPM | WEIGHT: 145.5 LBS | BODY MASS INDEX: 23.38 KG/M2 | OXYGEN SATURATION: 99 %

## 2022-09-28 DIAGNOSIS — M25.551 BILATERAL HIP PAIN: ICD-10-CM

## 2022-09-28 DIAGNOSIS — M25.552 BILATERAL HIP PAIN: ICD-10-CM

## 2022-09-28 DIAGNOSIS — L30.9 ECZEMA, UNSPECIFIED TYPE: ICD-10-CM

## 2022-09-28 DIAGNOSIS — L30.1 DYSHIDROTIC ECZEMA: ICD-10-CM

## 2022-09-28 DIAGNOSIS — Z00.00 ANNUAL PHYSICAL EXAM: Primary | ICD-10-CM

## 2022-09-28 LAB
BILIRUB UR QL STRIP: NEGATIVE
CLARITY UR REFRACT.AUTO: CLEAR
COLOR UR AUTO: YELLOW
GLUCOSE UR QL STRIP: NEGATIVE
HGB UR QL STRIP: NEGATIVE
KETONES UR QL STRIP: NEGATIVE
LEUKOCYTE ESTERASE UR QL STRIP: NEGATIVE
NITRITE UR QL STRIP: NEGATIVE
PH UR STRIP: 6 [PH] (ref 5–8)
PROT UR QL STRIP: NEGATIVE
SP GR UR STRIP: 1.01 (ref 1–1.03)
URN SPEC COLLECT METH UR: NORMAL

## 2022-09-28 PROCEDURE — 81003 URINALYSIS AUTO W/O SCOPE: CPT | Performed by: FAMILY MEDICINE

## 2022-09-28 PROCEDURE — 1159F MED LIST DOCD IN RCRD: CPT | Mod: CPTII,S$GLB,, | Performed by: FAMILY MEDICINE

## 2022-09-28 PROCEDURE — 3078F DIAST BP <80 MM HG: CPT | Mod: CPTII,S$GLB,, | Performed by: FAMILY MEDICINE

## 2022-09-28 PROCEDURE — 73521 XR HIPS BILATERAL 2 VIEW INCL AP PELVIS: ICD-10-PCS | Mod: 26,,, | Performed by: RADIOLOGY

## 2022-09-28 PROCEDURE — 1159F PR MEDICATION LIST DOCUMENTED IN MEDICAL RECORD: ICD-10-PCS | Mod: CPTII,S$GLB,, | Performed by: FAMILY MEDICINE

## 2022-09-28 PROCEDURE — 99395 PR PREVENTIVE VISIT,EST,18-39: ICD-10-PCS | Mod: S$GLB,,, | Performed by: FAMILY MEDICINE

## 2022-09-28 PROCEDURE — 3074F SYST BP LT 130 MM HG: CPT | Mod: CPTII,S$GLB,, | Performed by: FAMILY MEDICINE

## 2022-09-28 PROCEDURE — 3074F PR MOST RECENT SYSTOLIC BLOOD PRESSURE < 130 MM HG: ICD-10-PCS | Mod: CPTII,S$GLB,, | Performed by: FAMILY MEDICINE

## 2022-09-28 PROCEDURE — 99999 PR PBB SHADOW E&M-EST. PATIENT-LVL IV: ICD-10-PCS | Mod: PBBFAC,,, | Performed by: FAMILY MEDICINE

## 2022-09-28 PROCEDURE — 3008F PR BODY MASS INDEX (BMI) DOCUMENTED: ICD-10-PCS | Mod: CPTII,S$GLB,, | Performed by: FAMILY MEDICINE

## 2022-09-28 PROCEDURE — 73521 X-RAY EXAM HIPS BI 2 VIEWS: CPT | Mod: TC,FY

## 2022-09-28 PROCEDURE — 99999 PR PBB SHADOW E&M-EST. PATIENT-LVL IV: CPT | Mod: PBBFAC,,, | Performed by: FAMILY MEDICINE

## 2022-09-28 PROCEDURE — 99395 PREV VISIT EST AGE 18-39: CPT | Mod: S$GLB,,, | Performed by: FAMILY MEDICINE

## 2022-09-28 PROCEDURE — 3078F PR MOST RECENT DIASTOLIC BLOOD PRESSURE < 80 MM HG: ICD-10-PCS | Mod: CPTII,S$GLB,, | Performed by: FAMILY MEDICINE

## 2022-09-28 PROCEDURE — 73521 X-RAY EXAM HIPS BI 2 VIEWS: CPT | Mod: 26,,, | Performed by: RADIOLOGY

## 2022-09-28 PROCEDURE — 3008F BODY MASS INDEX DOCD: CPT | Mod: CPTII,S$GLB,, | Performed by: FAMILY MEDICINE

## 2022-09-28 RX ORDER — CLOBETASOL PROPIONATE 0.5 MG/G
CREAM TOPICAL 2 TIMES DAILY
Qty: 45 G | Refills: 3 | Status: SHIPPED | OUTPATIENT
Start: 2022-09-28 | End: 2024-02-08

## 2022-09-28 NOTE — PROGRESS NOTES
"Subjective:       Patient ID: Ray Ugarte is a 37 y.o. female.    Chief Complaint: Annual Exam    HPI  Pt is here for annual exam pt is well no sob/cp no change in bowel habits no brbpr  Pt denies dysuria hematuria no abnl vaginal bleeding  Pt denies n/v/f/c/d/c no cough chest congestion no sore throat  Pt has eczema requests refill of successful cream no new complaints  Pt is a runner does yoga very active she has bilateral hip pain does not take anything for this she has fam h/o hip djd  She would like to see orthopedics   Review of Systems   Constitutional:  Negative for activity change, chills, diaphoresis, fatigue and fever.   HENT:  Negative for congestion, ear discharge, ear pain, hearing loss, postnasal drip, rhinorrhea, sinus pressure, sneezing, sore throat, trouble swallowing and voice change.    Eyes:  Negative for photophobia, discharge, redness, itching and visual disturbance.   Respiratory:  Negative for cough, chest tightness, shortness of breath and wheezing.    Cardiovascular:  Negative for chest pain, palpitations and leg swelling.   Gastrointestinal:  Negative for abdominal pain, anal bleeding, blood in stool, constipation, diarrhea, nausea, rectal pain and vomiting.   Genitourinary:  Negative for dyspareunia, dysuria, flank pain, frequency, hematuria, menstrual problem, pelvic pain, urgency, vaginal bleeding and vaginal discharge.   Musculoskeletal:  Positive for arthralgias. Negative for back pain, joint swelling and neck pain.   Skin:  Negative for color change and rash.   Neurological:  Negative for dizziness, speech difficulty, weakness, light-headedness, numbness and headaches.   Hematological:  Does not bruise/bleed easily.   Psychiatric/Behavioral:  Negative for agitation, confusion, decreased concentration, sleep disturbance and suicidal ideas. The patient is not nervous/anxious.      Objective:    /74   Pulse 65   Ht 5' 6" (1.676 m)   Wt 66 kg (145 lb 8 oz)   LMP " 09/14/2022 (Approximate)   SpO2 99%   BMI 23.48 kg/m²     Physical Exam  Constitutional:       Appearance: Normal appearance. She is well-developed.   HENT:      Head: Normocephalic and atraumatic.      Right Ear: External ear normal.      Left Ear: External ear normal.      Nose: Nose normal.   Eyes:      General:         Right eye: No discharge.         Left eye: No discharge.      Conjunctiva/sclera: Conjunctivae normal.      Pupils: Pupils are equal, round, and reactive to light.   Neck:      Thyroid: No thyromegaly.   Cardiovascular:      Rate and Rhythm: Normal rate and regular rhythm.      Heart sounds: Normal heart sounds. No murmur heard.    No friction rub. No gallop.   Pulmonary:      Effort: Pulmonary effort is normal.      Breath sounds: Normal breath sounds. No wheezing or rales.   Abdominal:      General: Bowel sounds are normal. There is no distension.      Palpations: Abdomen is soft.      Tenderness: There is no abdominal tenderness. There is no guarding or rebound.   Genitourinary:     Vagina: Normal.      Comments: declined  Musculoskeletal:         General: No tenderness. Normal range of motion.      Cervical back: Normal range of motion and neck supple.   Lymphadenopathy:      Cervical: No cervical adenopathy.   Skin:     General: Skin is warm and dry.      Findings: No erythema or rash.   Neurological:      General: No focal deficit present.      Mental Status: She is alert and oriented to person, place, and time.      Cranial Nerves: No cranial nerve deficit.      Motor: No abnormal muscle tone.      Coordination: Coordination normal.   Psychiatric:         Behavior: Behavior normal.         Thought Content: Thought content normal.         Judgment: Judgment normal.       Assessment:       1. Annual physical exam    2. Eczema, unspecified type    3. Dyshidrotic eczema    4. Bilateral hip pain          Plan:     Orders cbc cmp tsh urine hip x-ray pt is not fasting  Cont meds  F/u ortho     "Low fat diet  Graded exercise  Rtc annually    Health maintenance  Lipid declined pt not fasting  Covid vaccinated  Pap with gyn        "This note will not be shared with the patient."       "

## 2022-09-30 ENCOUNTER — OFFICE VISIT (OUTPATIENT)
Dept: ORTHOPEDICS | Facility: CLINIC | Age: 37
End: 2022-09-30
Payer: COMMERCIAL

## 2022-09-30 VITALS — HEIGHT: 66 IN | WEIGHT: 140.75 LBS | BODY MASS INDEX: 22.62 KG/M2

## 2022-09-30 DIAGNOSIS — S73.199A TEAR OF ACETABULAR LABRUM, UNSPECIFIED LATERALITY, INITIAL ENCOUNTER: Primary | ICD-10-CM

## 2022-09-30 DIAGNOSIS — M25.552 BILATERAL HIP PAIN: ICD-10-CM

## 2022-09-30 DIAGNOSIS — M25.551 BILATERAL HIP PAIN: ICD-10-CM

## 2022-09-30 DIAGNOSIS — M25.559 PAIN IN UNSPECIFIED HIP: ICD-10-CM

## 2022-09-30 PROCEDURE — 99203 PR OFFICE/OUTPT VISIT, NEW, LEVL III, 30-44 MIN: ICD-10-PCS | Mod: S$GLB,,, | Performed by: ORTHOPAEDIC SURGERY

## 2022-09-30 PROCEDURE — 3008F PR BODY MASS INDEX (BMI) DOCUMENTED: ICD-10-PCS | Mod: CPTII,S$GLB,, | Performed by: ORTHOPAEDIC SURGERY

## 2022-09-30 PROCEDURE — 3008F BODY MASS INDEX DOCD: CPT | Mod: CPTII,S$GLB,, | Performed by: ORTHOPAEDIC SURGERY

## 2022-09-30 PROCEDURE — 99999 PR PBB SHADOW E&M-EST. PATIENT-LVL IV: ICD-10-PCS | Mod: PBBFAC,,, | Performed by: ORTHOPAEDIC SURGERY

## 2022-09-30 PROCEDURE — 1159F MED LIST DOCD IN RCRD: CPT | Mod: CPTII,S$GLB,, | Performed by: ORTHOPAEDIC SURGERY

## 2022-09-30 PROCEDURE — 1159F PR MEDICATION LIST DOCUMENTED IN MEDICAL RECORD: ICD-10-PCS | Mod: CPTII,S$GLB,, | Performed by: ORTHOPAEDIC SURGERY

## 2022-09-30 PROCEDURE — 1160F RVW MEDS BY RX/DR IN RCRD: CPT | Mod: CPTII,S$GLB,, | Performed by: ORTHOPAEDIC SURGERY

## 2022-09-30 PROCEDURE — 99999 PR PBB SHADOW E&M-EST. PATIENT-LVL IV: CPT | Mod: PBBFAC,,, | Performed by: ORTHOPAEDIC SURGERY

## 2022-09-30 PROCEDURE — 1160F PR REVIEW ALL MEDS BY PRESCRIBER/CLIN PHARMACIST DOCUMENTED: ICD-10-PCS | Mod: CPTII,S$GLB,, | Performed by: ORTHOPAEDIC SURGERY

## 2022-09-30 PROCEDURE — 99203 OFFICE O/P NEW LOW 30 MIN: CPT | Mod: S$GLB,,, | Performed by: ORTHOPAEDIC SURGERY

## 2022-10-02 PROBLEM — S73.199A ACETABULAR LABRUM TEAR: Status: ACTIVE | Noted: 2022-10-02

## 2022-10-03 NOTE — PROGRESS NOTES
Subjective:       Patient ID: Ray Ugarte is a 37 y.o. female.    Chief Complaint:   Pain of the Left Hip and Pain of the Right Hip  She comes in for intermittent pain in bilateral hips.  She has groin pain and clicking.  This increases with exercise and at night.  She has a strong family history of hip arthritis with hip replacement, and she is concerned because she is only 37 and she has small children.  No significant knee pain.  No fall, accident, injury.  The pain seems to have started around the time of her last pregnancy.  No distal numbness or tingling.  No significant back pain.    Past Medical History:   Diagnosis Date    Abnormal Pap smear of cervix ?    No procedures necessary. Repeat pap normal     Past Surgical History:   Procedure Laterality Date     SECTION  04/24/2015    x1    LASIK       Family History   Problem Relation Age of Onset    No Known Problems Mother     Pancreatic cancer Father 75    Breast cancer Neg Hx     Colon cancer Neg Hx     Ovarian cancer Neg Hx      Social History     Socioeconomic History    Marital status:    Tobacco Use    Smoking status: Never     Passive exposure: Never    Smokeless tobacco: Never   Substance and Sexual Activity    Alcohol use: No    Drug use: No    Sexual activity: Yes     Partners: Male     Birth control/protection: None   Social History Narrative    Works as     Lives with  and daughter       Current Outpatient Medications   Medication Sig Dispense Refill    clobetasoL (TEMOVATE) 0.05 % cream Apply topically 2 (two) times daily. For hand dermatitis 45 g 3    multivitamin capsule Take 1 capsule by mouth once daily.      norgestimate-ethinyl estradioL (ORTHO-CYCLEN) 0.25-35 mg-mcg per tablet Take 1 tablet by mouth once daily. 84 tablet 3     No current facility-administered medications for this visit.     Review of patient's allergies indicates:   Allergen Reactions    Shellfish containing products Nausea And  "Vomiting      Only mussels         Objective:      Vitals:    09/30/22 1505   Weight: 63.9 kg (140 lb 12.2 oz)   Height: 5' 6" (1.676 m)     Physical Exam  Bilateral hips:  Positive C sign, positive Stinchfield sign.  Positive FADIR test.  Negative BREANNA test.  No tenderness at the greater trochanter or iliotibial band.  No tenderness at the SI joint.  The patient has pain in the groin with passive flexion and internal rotation of the hip which is limited.  Knee benign without tenderness or effusion, with normal range of motion.  Skin intact.  Distal neurovascular intact.  Flip test negative.    Imaging Review:   Plain x-rays of the hips are unremarkable with the exception of possible mild dysplasia on the right.  No significant arthrosis.  Assessment:   Labral tear, hips  Plan:       We will obtain MRI with the 3T magnet, in lieu of bilateral MRA.  She will then follow up [facility]  The patient's pathophysiology was explained in detail with reference to x-rays, models, other visual aids as appropriate.  Treatment options were discussed in detail.  Questions were invited and answered to the patient's satisfaction.    Carlos Westbrook MD  Orthopaedic Surgery      "

## 2022-10-04 ENCOUNTER — TELEPHONE (OUTPATIENT)
Dept: ORTHOPEDICS | Facility: CLINIC | Age: 37
End: 2022-10-04
Payer: COMMERCIAL

## 2022-10-04 NOTE — TELEPHONE ENCOUNTER
Spoke with patient to let her know that the MRI is scheduled for 10/28 at 7am & 8am. Patient verbalized understanding.     ----- Message from Carlos Westbrook MD sent at 10/3/2022  9:28 AM CDT -----  Thanks.  Candice, here is the rationale for the 3T magnet, FYI.    ----- Message -----  From: Balaji Coy MD  Sent: 9/30/2022   3:28 PM CDT  To: Carlos Westbrook MD    Yes, the 3T magnet for MRI really eliminates the need for an arthrogram (most of the time).  The only one I know of is at the imaging center and is MRI 1 (the other 4 are not 3T).  The wait can be longer to get on that one though.  MR arthrograms are great, but the injection can be painful.  Arthrogram us better for labral tear, 3T does both labral imaging and cartilage.    Thanks for the referral.    Balaji    ----- Message -----  From: Carlos Westbrook MD  Sent: 9/30/2022   3:16 PM CDT  To: Balaji Coy MD    She has popping and occasional pain in the groins, and a strong family history of hip problems/THAs.  I noticed you mentioned using a 3T magnet instead of doing MRA.  Where is that, and how do I schedule into it?  Will have her follow up with you after.

## 2022-10-05 ENCOUNTER — TELEPHONE (OUTPATIENT)
Dept: ORTHOPEDICS | Facility: CLINIC | Age: 37
End: 2022-10-05
Payer: COMMERCIAL

## 2022-10-05 NOTE — TELEPHONE ENCOUNTER
Returned Call.  for patient to call [phone].    ----- Message from Itzel Hogan sent at 10/5/2022  3:44 PM CDT -----  Contact: pt  Pt returning call to office       Confirmed patient's contact info below:  Contact Name: Ray Ugarte  Phone Number: [phone]

## 2022-10-10 ENCOUNTER — OFFICE VISIT (OUTPATIENT)
Dept: ALLERGY | Facility: CLINIC | Age: 37
End: 2022-10-10
Payer: COMMERCIAL

## 2022-10-10 ENCOUNTER — LAB VISIT (OUTPATIENT)
Dept: LAB | Facility: HOSPITAL | Age: 37
End: 2022-10-10
Payer: COMMERCIAL

## 2022-10-10 VITALS
OXYGEN SATURATION: 100 % | HEIGHT: 66 IN | DIASTOLIC BLOOD PRESSURE: 72 MMHG | HEART RATE: 72 BPM | SYSTOLIC BLOOD PRESSURE: 120 MMHG | WEIGHT: 146.19 LBS | BODY MASS INDEX: 23.49 KG/M2

## 2022-10-10 DIAGNOSIS — J31.0 CHRONIC RHINITIS: Primary | ICD-10-CM

## 2022-10-10 DIAGNOSIS — J31.0 CHRONIC RHINITIS: ICD-10-CM

## 2022-10-10 DIAGNOSIS — S73.199A TEAR OF ACETABULAR LABRUM, UNSPECIFIED LATERALITY, INITIAL ENCOUNTER: ICD-10-CM

## 2022-10-10 DIAGNOSIS — H10.403 CHRONIC CONJUNCTIVITIS OF BOTH EYES, UNSPECIFIED CHRONIC CONJUNCTIVITIS TYPE: ICD-10-CM

## 2022-10-10 DIAGNOSIS — R05.9 COUGH, UNSPECIFIED TYPE: ICD-10-CM

## 2022-10-10 LAB — IGE SERPL-ACNC: 41 IU/ML (ref 0–100)

## 2022-10-10 PROCEDURE — 1160F RVW MEDS BY RX/DR IN RCRD: CPT | Mod: CPTII,S$GLB,, | Performed by: ALLERGY & IMMUNOLOGY

## 2022-10-10 PROCEDURE — 99999 PR PBB SHADOW E&M-EST. PATIENT-LVL III: ICD-10-PCS | Mod: PBBFAC,,, | Performed by: ALLERGY & IMMUNOLOGY

## 2022-10-10 PROCEDURE — 1160F PR REVIEW ALL MEDS BY PRESCRIBER/CLIN PHARMACIST DOCUMENTED: ICD-10-PCS | Mod: CPTII,S$GLB,, | Performed by: ALLERGY & IMMUNOLOGY

## 2022-10-10 PROCEDURE — 3078F DIAST BP <80 MM HG: CPT | Mod: CPTII,S$GLB,, | Performed by: ALLERGY & IMMUNOLOGY

## 2022-10-10 PROCEDURE — 3074F PR MOST RECENT SYSTOLIC BLOOD PRESSURE < 130 MM HG: ICD-10-PCS | Mod: CPTII,S$GLB,, | Performed by: ALLERGY & IMMUNOLOGY

## 2022-10-10 PROCEDURE — 3078F PR MOST RECENT DIASTOLIC BLOOD PRESSURE < 80 MM HG: ICD-10-PCS | Mod: CPTII,S$GLB,, | Performed by: ALLERGY & IMMUNOLOGY

## 2022-10-10 PROCEDURE — 99204 PR OFFICE/OUTPT VISIT, NEW, LEVL IV, 45-59 MIN: ICD-10-PCS | Mod: S$GLB,,, | Performed by: ALLERGY & IMMUNOLOGY

## 2022-10-10 PROCEDURE — 86003 ALLG SPEC IGE CRUDE XTRC EA: CPT | Performed by: ALLERGY & IMMUNOLOGY

## 2022-10-10 PROCEDURE — 3008F BODY MASS INDEX DOCD: CPT | Mod: CPTII,S$GLB,, | Performed by: ALLERGY & IMMUNOLOGY

## 2022-10-10 PROCEDURE — 36415 COLL VENOUS BLD VENIPUNCTURE: CPT | Performed by: ALLERGY & IMMUNOLOGY

## 2022-10-10 PROCEDURE — 99999 PR PBB SHADOW E&M-EST. PATIENT-LVL III: CPT | Mod: PBBFAC,,, | Performed by: ALLERGY & IMMUNOLOGY

## 2022-10-10 PROCEDURE — 1159F PR MEDICATION LIST DOCUMENTED IN MEDICAL RECORD: ICD-10-PCS | Mod: CPTII,S$GLB,, | Performed by: ALLERGY & IMMUNOLOGY

## 2022-10-10 PROCEDURE — 3008F PR BODY MASS INDEX (BMI) DOCUMENTED: ICD-10-PCS | Mod: CPTII,S$GLB,, | Performed by: ALLERGY & IMMUNOLOGY

## 2022-10-10 PROCEDURE — 3074F SYST BP LT 130 MM HG: CPT | Mod: CPTII,S$GLB,, | Performed by: ALLERGY & IMMUNOLOGY

## 2022-10-10 PROCEDURE — 86003 ALLG SPEC IGE CRUDE XTRC EA: CPT | Mod: 59 | Performed by: ALLERGY & IMMUNOLOGY

## 2022-10-10 PROCEDURE — 1159F MED LIST DOCD IN RCRD: CPT | Mod: CPTII,S$GLB,, | Performed by: ALLERGY & IMMUNOLOGY

## 2022-10-10 PROCEDURE — 82785 ASSAY OF IGE: CPT | Performed by: ALLERGY & IMMUNOLOGY

## 2022-10-10 PROCEDURE — 99204 OFFICE O/P NEW MOD 45 MIN: CPT | Mod: S$GLB,,, | Performed by: ALLERGY & IMMUNOLOGY
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
## 2022-10-13 LAB
A ALTERNATA IGE QN: <0.1 KU/L
A FUMIGATUS IGE QN: <0.1 KU/L
BERMUDA GRASS IGE QN: <0.1 KU/L
CAT DANDER IGE QN: <0.1 KU/L
CEDAR IGE QN: <0.1 KU/L
D FARINAE IGE QN: <0.1 KU/L
D PTERONYSS IGE QN: <0.1 KU/L
DEPRECATED A ALTERNATA IGE RAST QL: NORMAL
DEPRECATED A FUMIGATUS IGE RAST QL: NORMAL
DEPRECATED BERMUDA GRASS IGE RAST QL: NORMAL
DEPRECATED CAT DANDER IGE RAST QL: NORMAL
DEPRECATED CEDAR IGE RAST QL: NORMAL
DEPRECATED D FARINAE IGE RAST QL: NORMAL
DEPRECATED D PTERONYSS IGE RAST QL: NORMAL
DEPRECATED DOG DANDER IGE RAST QL: NORMAL
DEPRECATED ELDER IGE RAST QL: NORMAL
DEPRECATED ENGL PLANTAIN IGE RAST QL: NORMAL
DEPRECATED PECAN/HICK TREE IGE RAST QL: NORMAL
DEPRECATED ROACH IGE RAST QL: ABNORMAL
DEPRECATED TIMOTHY IGE RAST QL: NORMAL
DEPRECATED WEST RAGWEED IGE RAST QL: NORMAL
DEPRECATED WHITE OAK IGE RAST QL: NORMAL
DOG DANDER IGE QN: <0.1 KU/L
ELDER IGE QN: <0.1 KU/L
ENGL PLANTAIN IGE QN: <0.1 KU/L
PECAN/HICK TREE IGE QN: <0.1 KU/L
ROACH IGE QN: 0.19 KU/L
TIMOTHY IGE QN: <0.1 KU/L
WEST RAGWEED IGE QN: <0.1 KU/L
WHITE OAK IGE QN: <0.1 KU/L

## 2022-10-17 ENCOUNTER — OFFICE VISIT (OUTPATIENT)
Dept: ALLERGY | Facility: CLINIC | Age: 37
End: 2022-10-17
Payer: COMMERCIAL

## 2022-10-17 VITALS
SYSTOLIC BLOOD PRESSURE: 120 MMHG | DIASTOLIC BLOOD PRESSURE: 70 MMHG | HEART RATE: 71 BPM | BODY MASS INDEX: 23.7 KG/M2 | OXYGEN SATURATION: 98 % | WEIGHT: 146.81 LBS

## 2022-10-17 DIAGNOSIS — H10.403 CHRONIC CONJUNCTIVITIS OF BOTH EYES, UNSPECIFIED CHRONIC CONJUNCTIVITIS TYPE: ICD-10-CM

## 2022-10-17 DIAGNOSIS — H04.202 EYE TEARING, LEFT: ICD-10-CM

## 2022-10-17 DIAGNOSIS — J31.0 CHRONIC RHINITIS: Primary | ICD-10-CM

## 2022-10-17 PROCEDURE — 1159F MED LIST DOCD IN RCRD: CPT | Mod: CPTII,S$GLB,, | Performed by: ALLERGY & IMMUNOLOGY

## 2022-10-17 PROCEDURE — 1160F PR REVIEW ALL MEDS BY PRESCRIBER/CLIN PHARMACIST DOCUMENTED: ICD-10-PCS | Mod: CPTII,S$GLB,, | Performed by: ALLERGY & IMMUNOLOGY

## 2022-10-17 PROCEDURE — 99214 OFFICE O/P EST MOD 30 MIN: CPT | Mod: 25,S$GLB,, | Performed by: ALLERGY & IMMUNOLOGY

## 2022-10-17 PROCEDURE — 99999 PR PBB SHADOW E&M-EST. PATIENT-LVL III: ICD-10-PCS | Mod: PBBFAC,,, | Performed by: ALLERGY & IMMUNOLOGY

## 2022-10-17 PROCEDURE — 3074F SYST BP LT 130 MM HG: CPT | Mod: CPTII,S$GLB,, | Performed by: ALLERGY & IMMUNOLOGY

## 2022-10-17 PROCEDURE — 1159F PR MEDICATION LIST DOCUMENTED IN MEDICAL RECORD: ICD-10-PCS | Mod: CPTII,S$GLB,, | Performed by: ALLERGY & IMMUNOLOGY

## 2022-10-17 PROCEDURE — 95004 PERQ TESTS W/ALRGNC XTRCS: CPT | Mod: S$GLB,,, | Performed by: ALLERGY & IMMUNOLOGY

## 2022-10-17 PROCEDURE — 99214 PR OFFICE/OUTPT VISIT, EST, LEVL IV, 30-39 MIN: ICD-10-PCS | Mod: 25,S$GLB,, | Performed by: ALLERGY & IMMUNOLOGY

## 2022-10-17 PROCEDURE — 3074F PR MOST RECENT SYSTOLIC BLOOD PRESSURE < 130 MM HG: ICD-10-PCS | Mod: CPTII,S$GLB,, | Performed by: ALLERGY & IMMUNOLOGY

## 2022-10-17 PROCEDURE — 3078F DIAST BP <80 MM HG: CPT | Mod: CPTII,S$GLB,, | Performed by: ALLERGY & IMMUNOLOGY

## 2022-10-17 PROCEDURE — 3078F PR MOST RECENT DIASTOLIC BLOOD PRESSURE < 80 MM HG: ICD-10-PCS | Mod: CPTII,S$GLB,, | Performed by: ALLERGY & IMMUNOLOGY

## 2022-10-17 PROCEDURE — 95004 PR ALLERGY SKIN TESTS,ALLERGENS: ICD-10-PCS | Mod: S$GLB,,, | Performed by: ALLERGY & IMMUNOLOGY

## 2022-10-17 PROCEDURE — 1160F RVW MEDS BY RX/DR IN RCRD: CPT | Mod: CPTII,S$GLB,, | Performed by: ALLERGY & IMMUNOLOGY

## 2022-10-17 PROCEDURE — 99999 PR PBB SHADOW E&M-EST. PATIENT-LVL III: CPT | Mod: PBBFAC,,, | Performed by: ALLERGY & IMMUNOLOGY

## 2022-10-19 ENCOUNTER — TELEPHONE (OUTPATIENT)
Dept: ORTHOPEDICS | Facility: CLINIC | Age: 37
End: 2022-10-19
Payer: COMMERCIAL

## 2022-10-19 DIAGNOSIS — M25.559 PAIN IN UNSPECIFIED HIP: Primary | ICD-10-CM

## 2022-10-19 NOTE — TELEPHONE ENCOUNTER
Spoke with patient who is aware that order have been faxed to [facility] in [place]. Patient also aware that when scheduling appt-to let them know that it has to be done with 3T magnet Machine.    ----- Message from Carlos Westbrook MD sent at 10/19/2022  8:01 AM CDT -----  Done    ----- Message -----  From: Candice Payan MA  Sent: 10/18/2022   3:09 PM CDT  To: Carlos Westbrook MD    Can you please place the same MRI orders for external imaging. Her insurance wants [facility] to go to [facility] because they're $400 less than here. They have a 3T Magnet MRI machine in [place].

## 2022-10-19 NOTE — TELEPHONE ENCOUNTER
MRI orders faxed to [facility]. Written on orders is that imaging should be done on 3T MAGNET MACHINE. Spoke with [facility], on yesterday, who states that they do have a 3T MAGNET MACHINE in [place]. Patients insurance is requesting that it be done at an outside facility because it's cheaper.    ----- Message from Candice Payan MA sent at 10/17/2022  4:54 PM CDT -----  Regarding: FW: Patient Call Back  Contact: Pt insurance    ----- Message -----  From: Colleen Braswell, Patient Care Assistant  Sent: 10/17/2022  10:54 AM CDT  To: Dariana CHRISTENSEN Staff  Subject: Patient Call Back                                Patrice morgan from [facility] is requesting a call back in regards to changing pt's location to get imaging. Pt would like referral be sent to [facility] in [place], [place].      [facility] Fax: [phone]       We sent a script for PT's [place] to [facility] - for 10 pills - Marguerite Gains to them to cancel the script and have PT get her pain medications from Ortho - (right wrist pain)  Today PT lm on medication line that she needs a refill of her Norco - you approved it and now Carol De Los Santos is questioning it - are we filling or not?

## 2022-10-20 ENCOUNTER — TELEPHONE (OUTPATIENT)
Dept: OPHTHALMOLOGY | Facility: CLINIC | Age: 37
End: 2022-10-20
Payer: COMMERCIAL

## 2022-10-20 NOTE — TELEPHONE ENCOUNTER
----- Message from Shania Dowell sent at 10/20/2022 10:24 AM CDT -----  Patient is calling to schedule from referral.  Pt has a blocked tear duct.  Please contact pt at [phone]

## 2022-10-20 NOTE — TELEPHONE ENCOUNTER
No available appt times at this time. I have added patient to my waitlist. I will call patient back for scheduling when we get availability.

## 2022-11-04 ENCOUNTER — PATIENT MESSAGE (OUTPATIENT)
Dept: ORTHOPEDICS | Facility: CLINIC | Age: 37
End: 2022-11-04
Payer: COMMERCIAL

## 2022-11-08 ENCOUNTER — TELEPHONE (OUTPATIENT)
Dept: ORTHOPEDICS | Facility: CLINIC | Age: 37
End: 2022-11-08
Payer: COMMERCIAL

## 2022-11-08 NOTE — TELEPHONE ENCOUNTER
Called and discussed MRI findings of labral tear with mild arthritis.  We will have her see Dr. Coy for his opinion and advice.  Questions invited and answered to the patient's satisfaction

## 2022-11-11 ENCOUNTER — PATIENT MESSAGE (OUTPATIENT)
Dept: ORTHOPEDICS | Facility: CLINIC | Age: 37
End: 2022-11-11
Payer: COMMERCIAL

## 2022-11-17 ENCOUNTER — TELEPHONE (OUTPATIENT)
Dept: SPORTS MEDICINE | Facility: CLINIC | Age: 37
End: 2022-11-17
Payer: COMMERCIAL

## 2022-11-17 ENCOUNTER — HOSPITAL ENCOUNTER (OUTPATIENT)
Dept: RADIOLOGY | Facility: HOSPITAL | Age: 37
Discharge: HOME | End: 2022-11-17
Attending: STUDENT IN AN ORGANIZED HEALTH CARE EDUCATION/TRAINING PROGRAM
Payer: COMMERCIAL

## 2022-11-17 ENCOUNTER — OFFICE VISIT (OUTPATIENT)
Dept: SPORTS MEDICINE | Facility: CLINIC | Age: 37
End: 2022-11-17
Payer: COMMERCIAL

## 2022-11-17 VITALS
BODY MASS INDEX: 22.82 KG/M2 | HEART RATE: 72 BPM | WEIGHT: 142 LBS | SYSTOLIC BLOOD PRESSURE: 116 MMHG | DIASTOLIC BLOOD PRESSURE: 69 MMHG | HEIGHT: 66 IN

## 2022-11-17 DIAGNOSIS — S73.191A TEAR OF RIGHT ACETABULAR LABRUM, INITIAL ENCOUNTER: ICD-10-CM

## 2022-11-17 DIAGNOSIS — S73.192A TEAR OF LEFT ACETABULAR LABRUM, INITIAL ENCOUNTER: Primary | ICD-10-CM

## 2022-11-17 DIAGNOSIS — M25.552 BILATERAL HIP PAIN: ICD-10-CM

## 2022-11-17 DIAGNOSIS — M25.551 BILATERAL HIP PAIN: ICD-10-CM

## 2022-11-17 DIAGNOSIS — S73.199A TEAR OF ACETABULAR LABRUM, UNSPECIFIED LATERALITY, INITIAL ENCOUNTER: ICD-10-CM

## 2022-11-17 PROCEDURE — 73521 X-RAY EXAM HIPS BI 2 VIEWS: CPT | Mod: 26,,, | Performed by: RADIOLOGY

## 2022-11-17 PROCEDURE — 3008F PR BODY MASS INDEX (BMI) DOCUMENTED: ICD-10-PCS | Mod: CPTII,S$GLB,, | Performed by: STUDENT IN AN ORGANIZED HEALTH CARE EDUCATION/TRAINING PROGRAM

## 2022-11-17 PROCEDURE — 99204 PR OFFICE/OUTPT VISIT, NEW, LEVL IV, 45-59 MIN: ICD-10-PCS | Mod: S$GLB,,, | Performed by: STUDENT IN AN ORGANIZED HEALTH CARE EDUCATION/TRAINING PROGRAM

## 2022-11-17 PROCEDURE — 3078F PR MOST RECENT DIASTOLIC BLOOD PRESSURE < 80 MM HG: ICD-10-PCS | Mod: CPTII,S$GLB,, | Performed by: STUDENT IN AN ORGANIZED HEALTH CARE EDUCATION/TRAINING PROGRAM

## 2022-11-17 PROCEDURE — 73521 XR HIPS BILATERAL 2 VIEW INCL AP PELVIS: ICD-10-PCS | Mod: 26,,, | Performed by: RADIOLOGY

## 2022-11-17 PROCEDURE — 99999 PR PBB SHADOW E&M-EST. PATIENT-LVL IV: ICD-10-PCS | Mod: PBBFAC,,, | Performed by: STUDENT IN AN ORGANIZED HEALTH CARE EDUCATION/TRAINING PROGRAM

## 2022-11-17 PROCEDURE — 1159F MED LIST DOCD IN RCRD: CPT | Mod: CPTII,S$GLB,, | Performed by: STUDENT IN AN ORGANIZED HEALTH CARE EDUCATION/TRAINING PROGRAM

## 2022-11-17 PROCEDURE — 3008F BODY MASS INDEX DOCD: CPT | Mod: CPTII,S$GLB,, | Performed by: STUDENT IN AN ORGANIZED HEALTH CARE EDUCATION/TRAINING PROGRAM

## 2022-11-17 PROCEDURE — 99999 PR PBB SHADOW E&M-EST. PATIENT-LVL IV: CPT | Mod: PBBFAC,,, | Performed by: STUDENT IN AN ORGANIZED HEALTH CARE EDUCATION/TRAINING PROGRAM

## 2022-11-17 PROCEDURE — 99204 OFFICE O/P NEW MOD 45 MIN: CPT | Mod: S$GLB,,, | Performed by: STUDENT IN AN ORGANIZED HEALTH CARE EDUCATION/TRAINING PROGRAM

## 2022-11-17 PROCEDURE — 73521 X-RAY EXAM HIPS BI 2 VIEWS: CPT | Mod: TC

## 2022-11-17 PROCEDURE — 1160F RVW MEDS BY RX/DR IN RCRD: CPT | Mod: CPTII,S$GLB,, | Performed by: STUDENT IN AN ORGANIZED HEALTH CARE EDUCATION/TRAINING PROGRAM

## 2022-11-17 PROCEDURE — 3074F SYST BP LT 130 MM HG: CPT | Mod: CPTII,S$GLB,, | Performed by: STUDENT IN AN ORGANIZED HEALTH CARE EDUCATION/TRAINING PROGRAM

## 2022-11-17 PROCEDURE — 1160F PR REVIEW ALL MEDS BY PRESCRIBER/CLIN PHARMACIST DOCUMENTED: ICD-10-PCS | Mod: CPTII,S$GLB,, | Performed by: STUDENT IN AN ORGANIZED HEALTH CARE EDUCATION/TRAINING PROGRAM

## 2022-11-17 PROCEDURE — 1159F PR MEDICATION LIST DOCUMENTED IN MEDICAL RECORD: ICD-10-PCS | Mod: CPTII,S$GLB,, | Performed by: STUDENT IN AN ORGANIZED HEALTH CARE EDUCATION/TRAINING PROGRAM

## 2022-11-17 PROCEDURE — 3078F DIAST BP <80 MM HG: CPT | Mod: CPTII,S$GLB,, | Performed by: STUDENT IN AN ORGANIZED HEALTH CARE EDUCATION/TRAINING PROGRAM

## 2022-11-17 PROCEDURE — 3074F PR MOST RECENT SYSTOLIC BLOOD PRESSURE < 130 MM HG: ICD-10-PCS | Mod: CPTII,S$GLB,, | Performed by: STUDENT IN AN ORGANIZED HEALTH CARE EDUCATION/TRAINING PROGRAM

## 2022-11-17 NOTE — TELEPHONE ENCOUNTER
----- Message from Itzel Hogan sent at 11/17/2022 10:47 AM CST -----  Contact: pt  Pt calling in regards to having her PT order sent to [facility] fax [phone]      cont [phone]    Confirmed patient's contact info below:  Contact Name: Ray Ugarte  Phone Number: [phone]

## 2022-12-22 ENCOUNTER — TELEPHONE (OUTPATIENT)
Dept: OPHTHALMOLOGY | Facility: CLINIC | Age: 37
End: 2022-12-22
Payer: COMMERCIAL

## 2022-12-22 NOTE — TELEPHONE ENCOUNTER
----- Message from Chester Tyson sent at 12/22/2022 11:03 AM CST -----  Contact: Ray [phone]  Pt is calling to schedule an appt for a blocked tear duct. She has a referral in the system. Please call back to further assist.

## 2023-06-07 ENCOUNTER — TELEPHONE (OUTPATIENT)
Dept: OPHTHALMOLOGY | Facility: CLINIC | Age: 38
End: 2023-06-07
Payer: COMMERCIAL

## 2023-06-07 NOTE — TELEPHONE ENCOUNTER
Called pt there was no answer     ----- Message from Samanta العلي sent at 6/7/2023  4:12 PM CDT -----  Regarding: Wait List Inquiry  Pt called to f/u on  where she is on the list.    Pts call back- [phone]

## 2023-08-14 ENCOUNTER — OFFICE VISIT (OUTPATIENT)
Dept: OBSTETRICS AND GYNECOLOGY | Facility: CLINIC | Age: 38
End: 2023-08-14
Payer: COMMERCIAL

## 2023-08-14 VITALS — WEIGHT: 149.94 LBS | BODY MASS INDEX: 24.2 KG/M2 | DIASTOLIC BLOOD PRESSURE: 73 MMHG | SYSTOLIC BLOOD PRESSURE: 120 MMHG

## 2023-08-14 DIAGNOSIS — Z12.4 PAP SMEAR FOR CERVICAL CANCER SCREENING: ICD-10-CM

## 2023-08-14 DIAGNOSIS — N64.4 BREAST PAIN, LEFT: ICD-10-CM

## 2023-08-14 DIAGNOSIS — Z30.41 ENCOUNTER FOR SURVEILLANCE OF CONTRACEPTIVE PILLS: ICD-10-CM

## 2023-08-14 DIAGNOSIS — Z01.419 WELL WOMAN EXAM WITH ROUTINE GYNECOLOGICAL EXAM: Primary | ICD-10-CM

## 2023-08-14 PROCEDURE — 99395 PR PREVENTIVE VISIT,EST,18-39: ICD-10-PCS | Mod: S$GLB,,, | Performed by: NURSE PRACTITIONER

## 2023-08-14 PROCEDURE — 3008F PR BODY MASS INDEX (BMI) DOCUMENTED: ICD-10-PCS | Mod: CPTII,S$GLB,, | Performed by: NURSE PRACTITIONER

## 2023-08-14 PROCEDURE — 88175 CYTOPATH C/V AUTO FLUID REDO: CPT | Performed by: NURSE PRACTITIONER

## 2023-08-14 PROCEDURE — 3078F PR MOST RECENT DIASTOLIC BLOOD PRESSURE < 80 MM HG: ICD-10-PCS | Mod: CPTII,S$GLB,, | Performed by: NURSE PRACTITIONER

## 2023-08-14 PROCEDURE — 1159F PR MEDICATION LIST DOCUMENTED IN MEDICAL RECORD: ICD-10-PCS | Mod: CPTII,S$GLB,, | Performed by: NURSE PRACTITIONER

## 2023-08-14 PROCEDURE — 99395 PREV VISIT EST AGE 18-39: CPT | Mod: S$GLB,,, | Performed by: NURSE PRACTITIONER

## 2023-08-14 PROCEDURE — 3074F SYST BP LT 130 MM HG: CPT | Mod: CPTII,S$GLB,, | Performed by: NURSE PRACTITIONER

## 2023-08-14 PROCEDURE — 99999 PR PBB SHADOW E&M-EST. PATIENT-LVL III: CPT | Mod: PBBFAC,,, | Performed by: NURSE PRACTITIONER

## 2023-08-14 PROCEDURE — 99999 PR PBB SHADOW E&M-EST. PATIENT-LVL III: ICD-10-PCS | Mod: PBBFAC,,, | Performed by: NURSE PRACTITIONER

## 2023-08-14 PROCEDURE — 3008F BODY MASS INDEX DOCD: CPT | Mod: CPTII,S$GLB,, | Performed by: NURSE PRACTITIONER

## 2023-08-14 PROCEDURE — 87624 HPV HI-RISK TYP POOLED RSLT: CPT | Performed by: NURSE PRACTITIONER

## 2023-08-14 PROCEDURE — 3078F DIAST BP <80 MM HG: CPT | Mod: CPTII,S$GLB,, | Performed by: NURSE PRACTITIONER

## 2023-08-14 PROCEDURE — 3074F PR MOST RECENT SYSTOLIC BLOOD PRESSURE < 130 MM HG: ICD-10-PCS | Mod: CPTII,S$GLB,, | Performed by: NURSE PRACTITIONER

## 2023-08-14 PROCEDURE — 1159F MED LIST DOCD IN RCRD: CPT | Mod: CPTII,S$GLB,, | Performed by: NURSE PRACTITIONER

## 2023-08-14 RX ORDER — NORGESTIMATE AND ETHINYL ESTRADIOL 0.25-0.035
1 KIT ORAL DAILY
Qty: 84 TABLET | Refills: 3 | Status: SHIPPED | OUTPATIENT
Start: 2023-08-14 | End: 2024-02-08

## 2023-08-14 NOTE — PROGRESS NOTES
CC: Annual  HPI: Pt is a 37 y.o.  female who presents for routine annual exam. She uses cocs for contraception. She does not want STD screening. Reports intermittent left sided breast pain for a few months now. No known lumps. Denies pain or any changes in right breast. Unsure of any triggers for pain, has not noticed if related to menses. Denies any trauma to chest or breast, no new medications. No nipple discharge.     Notes from visit in -  CC: Annual  HPI: Pt is a 36 y.o.  female who presents for routine annual exam. Pt of Dr. Khan- switched to see me today due to delivery. She uses pops for contraception. She does not want STD screening. Youngest is 1 yo- no longer breast feeding. Started pills after delivery and still on them- having irregular periods for the past year. Ready to switch to a rabia. Requesting refill of Lotrisone.     FH:   Breast cancer: none  Colon cancer: none  Ovarian cancer: none  Uterine cancer: none    HPV vaccine: not on file  Last pap smear:  nilm  History of abnormal pap smears: no    Colonoscopy: na  DEXA: na  Mammogram: na  STD history: no  Birth control: ocps  OB history:   Tobacco use: no    ROS:  GENERAL: Feeling well overall. Denies fever or chills.   SKIN: Denies rash or lesions.   HEAD: Denies head injury or headache.   NODES: Denies enlarged lymph nodes.   CHEST: Denies chest pain or shortness of breath.   CARDIOVASCULAR: Denies palpitations or left sided chest pain.   ABDOMEN: No abdominal pain, constipation, diarrhea, nausea, vomiting or rectal bleeding.   URINARY: No dysuria, hematuria, or burning on urination.  REPRODUCTIVE: See HPI.   BREASTS: Denies pain, lumps, or nipple discharge.   HEMATOLOGIC: No easy bruisability or excessive bleeding.   MUSCULOSKELETAL: Denies joint pain or swelling.   NEUROLOGIC: Denies syncope or weakness.   PSYCHIATRIC: Denies depression, anxiety or mood swings.    PE:   APPEARANCE: Well nourished, well developed, White female in no  acute distress.  NODES: no cervical, supraclavicular, or inguinal lymphadenopathy  BREASTS: Symmetrical, no skin changes or visible lesions. No palpable masses, nipple discharge or adenopathy bilaterally.  ABDOMEN: Soft. No tenderness or masses. No distention. No hernias palpated. No CVA tenderness.  VULVA: No lesions. Normal external female genitalia.  URETHRAL MEATUS: Normal size and location, no lesions, no prolapse.  URETHRA: No masses, tenderness, or prolapse.  VAGINA: Moist. No lesions or lacerations noted. No abnormal discharge present. No odor present.   CERVIX: No lesions or discharge. No cervical motion tenderness.   UTERUS: Normal size, regular shape, mobile, non-tender.  ADNEXA: No tenderness. No fullness or masses palpated in the adnexal regions.   ANUS PERINEUM: Normal.      Diagnosis:  1. Well woman exam with routine gynecological exam    2. Encounter for surveillance of contraceptive pills    3. Pap smear for cervical cancer screening    4. Breast pain, left        Plan:     Orders Placed This Encounter    HPV High Risk Genotypes, PCR    US Breast Left Limited    Mammo Digital Diagnostic Left with Mckay    Liquid-Based Pap Smear, Screening    norgestimate-ethinyl estradioL (ESTARYLLA) 0.25-35 mg-mcg per tablet     Breast exam findings benign, us and diagnostic mammogram ordered for intermittent pain  Pap updated  C/w ocps  Declines std screening    Patient was counseled today on the new ACS guidelines for cervical cytology screening as well as the current recommendations for breast cancer screening. She was counseled to follow up with her PCP for other routine health maintenance. Counseling session lasted approximately 10 minutes, and all her questions were answered.  For women over the age of 65, you can stop having cervical cancer screenings if you have never had abnormal cervical cells or cervical cancer, and youve had three negative Pap tests in a row. (You also can stop screening if youve had  two negative Pap and HPV tests in a row in the past 10 years, with at least one test in the past 5 years.),    Follow-up with me in 1 year for routine exam; pap in 3 years.     I spent a total of 20 minutes on the day of the visit.This includes face to face time and non-face to face time preparing to see the patient (eg, review of tests), obtaining and/or reviewing separately obtained history, documenting clinical information in the electronic or other health record, independently interpreting results and communicating results to the patient/family/caregiver, or care coordinator.    As of April 1, 2021, the Cures Act has been passed nationally. This new law requires that all doctors progress notes, lab results, pathology reports and radiology reports be released IMMEDIATELY to the patient in the patient portal. That means that the results are released to you at the EXACT same time they are released to me. Therefore, with all of the patients that I have I am not able to reply to each patient exactly when the results come in. So there will be a delay from when you see the results to when I see them and have time to come up with a response to send you. Also I only see these results when I am on the computer at work. So if the results come in over the weekend or after 5 pm of a work day, I will not see them until the next business day. As you can tell, this is a challenge as a provider to give every patient the quick response they hope for and deserve. So please be patient!     Thanks for your understanding and patience.

## 2023-08-21 LAB
FINAL PATHOLOGIC DIAGNOSIS: NORMAL
Lab: NORMAL

## 2023-10-10 ENCOUNTER — OFFICE VISIT (OUTPATIENT)
Dept: OPHTHALMOLOGY | Facility: CLINIC | Age: 38
End: 2023-10-10
Payer: COMMERCIAL

## 2023-10-10 DIAGNOSIS — H04.552 NLDO, ACQUIRED (NASOLACRIMAL DUCT OBSTRUCTION), LEFT: ICD-10-CM

## 2023-10-10 PROCEDURE — 99204 PR OFFICE/OUTPT VISIT, NEW, LEVL IV, 45-59 MIN: ICD-10-PCS | Mod: 25,S$GLB,, | Performed by: OPHTHALMOLOGY

## 2023-10-10 PROCEDURE — 68840 EXPLORE/IRRIGATE TEAR DUCTS: CPT | Mod: 50,S$GLB,, | Performed by: OPHTHALMOLOGY

## 2023-10-10 PROCEDURE — 99999 PR PBB SHADOW E&M-EST. PATIENT-LVL III: ICD-10-PCS | Mod: PBBFAC,,, | Performed by: OPHTHALMOLOGY

## 2023-10-10 PROCEDURE — 1160F PR REVIEW ALL MEDS BY PRESCRIBER/CLIN PHARMACIST DOCUMENTED: ICD-10-PCS | Mod: CPTII,S$GLB,, | Performed by: OPHTHALMOLOGY

## 2023-10-10 PROCEDURE — 31231 NASAL ENDOSCOPY DX: CPT | Mod: 51,52,S$GLB, | Performed by: OPHTHALMOLOGY

## 2023-10-10 PROCEDURE — 99999 PR PBB SHADOW E&M-EST. PATIENT-LVL III: CPT | Mod: PBBFAC,,, | Performed by: OPHTHALMOLOGY

## 2023-10-10 PROCEDURE — 1159F PR MEDICATION LIST DOCUMENTED IN MEDICAL RECORD: ICD-10-PCS | Mod: CPTII,S$GLB,, | Performed by: OPHTHALMOLOGY

## 2023-10-10 PROCEDURE — 1160F RVW MEDS BY RX/DR IN RCRD: CPT | Mod: CPTII,S$GLB,, | Performed by: OPHTHALMOLOGY

## 2023-10-10 PROCEDURE — 68840 PR EXPLORE LACRIMAL CANALICULI: ICD-10-PCS | Mod: 50,S$GLB,, | Performed by: OPHTHALMOLOGY

## 2023-10-10 PROCEDURE — 1159F MED LIST DOCD IN RCRD: CPT | Mod: CPTII,S$GLB,, | Performed by: OPHTHALMOLOGY

## 2023-10-10 PROCEDURE — 31231 PR NASAL ENDOSCOPY, DX: ICD-10-PCS | Mod: 51,52,S$GLB, | Performed by: OPHTHALMOLOGY

## 2023-10-10 PROCEDURE — 99204 OFFICE O/P NEW MOD 45 MIN: CPT | Mod: 25,S$GLB,, | Performed by: OPHTHALMOLOGY

## 2023-10-10 NOTE — PROGRESS NOTES
ANGELIKA Ugarte is a/an 38 y.o. female here for NLDO evaluation.   Referred by: Dr. Melissa   Eye Meds: None  Pt scheduled from wait list     Pt here for blocked tear duct of OS,  Issue has been ongoing for years.    Pt presents excessive watery/ heavily tearing OS today   States OS was ONLY heavily tearing for about 15 years until COVID Dx in   2020 ,  then proceeded to experience pain and irritation along with tearing.   States OS is in some pain today (2 out of 10 on pain scale)        Last edited by Kwesi Ojeda on 10/10/2023  1:24 PM.            Assessment /Plan     For exam results, see Encounter Report.    Nldo, acquired (nasolacrimal duct obstruction), left  -     Ambulatory referral/consult to Ophthalmology  -     External Photography - OU - Both Eyes  -     NASAL ENDOSCOPY,DIAG.UNI/BILAT  -     PROBING NASOLACRIMAL DUCT - OU - BOTH EYES        The patient is a pleasant 38-year-old female here for evaluation of chronic left-sided tearing over the last 2 years.  The patient states that she had a bad left eye infection and subsequently had significant worsening of tearing on the left side.  The patient also was diagnosed with COVID-19 and since then she is had episodes of pain associated with this tearing.  She is a prior history of bilateral LASIK.  She denies any prior history of rhinoplasty are septoplasty or facial trauma.  She does not have any prior history of breast carcinoma or use of radioactive iodine.    On exam, the patient has an elevated tear [place] on the left side.  She has trace inferior exposure keratopathy bilaterally.    Irrigation:  OD:patent lower punctum, canaliculus, and nld with 95 % flow to the nose and 5% reflux  OS:patent left lower punctum, canaliculus, and nld with 0% flow to the nose and 100% reflux      Nasal endoscopy:  OD: Normal inferior turbinate, middle turbinate with right-sided septal deviation, no tumors or masses  OS: Normal inferior turbinate and atrophied middle  turbinate without septal deviation, no tumors or masses    These findings were discussed with the patient.      We discussed the option of observation versus dacryocystorhinostomy with placement of silicone stents.     Plan for left endoscopic dacryocystorhinostomy with placement of silicone stents, possible middle turbinectomy, possible ethmoidectomy and possible lacrimal sac biopsy.      Informed consent obtained after extensive risks/benefits/alternatives were discussed with the patient including but not limited to pain, bleeding, infection, ocular injury, loss of the eye, asymmetry, need for revision in future, scarring.  Alternatives such as waiting were discussed.  All questions were answered.       Hold ASA, NSAIDS, fish oil, Vitamin E and Multivitamins 5 to 7 days prior to procedure.     Return for surgery

## 2023-10-12 ENCOUNTER — TELEPHONE (OUTPATIENT)
Dept: OPHTHALMOLOGY | Facility: CLINIC | Age: 38
End: 2023-10-12
Payer: COMMERCIAL

## 2023-10-16 ENCOUNTER — TELEPHONE (OUTPATIENT)
Dept: OPHTHALMOLOGY | Facility: CLINIC | Age: 38
End: 2023-10-16
Payer: COMMERCIAL

## 2023-10-16 NOTE — TELEPHONE ENCOUNTER
Called pt again to [facility] sx there was no answer a msg was left pt consent put to file until she calls back to ada

## 2023-10-17 ENCOUNTER — OFFICE VISIT (OUTPATIENT)
Dept: FAMILY MEDICINE | Facility: CLINIC | Age: 38
End: 2023-10-17
Payer: COMMERCIAL

## 2023-10-17 ENCOUNTER — LAB VISIT (OUTPATIENT)
Dept: LAB | Facility: HOSPITAL | Age: 38
End: 2023-10-17
Attending: NURSE PRACTITIONER
Payer: COMMERCIAL

## 2023-10-17 VITALS
HEART RATE: 68 BPM | DIASTOLIC BLOOD PRESSURE: 68 MMHG | HEIGHT: 66 IN | WEIGHT: 149.38 LBS | BODY MASS INDEX: 24.01 KG/M2 | SYSTOLIC BLOOD PRESSURE: 109 MMHG | RESPIRATION RATE: 16 BRPM

## 2023-10-17 DIAGNOSIS — H04.552 OBSTRUCTION OF LEFT TEAR DUCT: ICD-10-CM

## 2023-10-17 DIAGNOSIS — M25.561 CHRONIC PAIN OF RIGHT KNEE: ICD-10-CM

## 2023-10-17 DIAGNOSIS — Z00.00 ANNUAL PHYSICAL EXAM: ICD-10-CM

## 2023-10-17 DIAGNOSIS — F41.9 ANXIETY AND DEPRESSION: ICD-10-CM

## 2023-10-17 DIAGNOSIS — G89.29 CHRONIC PAIN OF RIGHT KNEE: ICD-10-CM

## 2023-10-17 DIAGNOSIS — Z00.00 ANNUAL PHYSICAL EXAM: Primary | ICD-10-CM

## 2023-10-17 DIAGNOSIS — F32.A ANXIETY AND DEPRESSION: ICD-10-CM

## 2023-10-17 LAB
ALBUMIN SERPL BCP-MCNC: 4 G/DL (ref 3.5–5.2)
ALP SERPL-CCNC: 47 U/L (ref 55–135)
ALT SERPL W/O P-5'-P-CCNC: 8 U/L (ref 10–44)
ANION GAP SERPL CALC-SCNC: 12 MMOL/L (ref 8–16)
AST SERPL-CCNC: 14 U/L (ref 10–40)
BASOPHILS # BLD AUTO: 0.04 K/UL (ref 0–0.2)
BASOPHILS NFR BLD: 0.6 % (ref 0–1.9)
BILIRUB SERPL-MCNC: 0.4 MG/DL (ref 0.1–1)
BUN SERPL-MCNC: 7 MG/DL (ref 6–20)
CALCIUM SERPL-MCNC: 9.6 MG/DL (ref 8.7–10.5)
CHLORIDE SERPL-SCNC: 105 MMOL/L (ref 95–110)
CHOLEST SERPL-MCNC: 204 MG/DL (ref 120–199)
CHOLEST/HDLC SERPL: 3.2 {RATIO} (ref 2–5)
CO2 SERPL-SCNC: 23 MMOL/L (ref 23–29)
CREAT SERPL-MCNC: 0.9 MG/DL (ref 0.5–1.4)
DIFFERENTIAL METHOD: NORMAL
EOSINOPHIL # BLD AUTO: 0.1 K/UL (ref 0–0.5)
EOSINOPHIL NFR BLD: 1.2 % (ref 0–8)
ERYTHROCYTE [DISTWIDTH] IN BLOOD BY AUTOMATED COUNT: 11.9 % (ref 11.5–14.5)
EST. GFR  (NO RACE VARIABLE): >60 ML/MIN/1.73 M^2
GLUCOSE SERPL-MCNC: 91 MG/DL (ref 70–110)
HCT VFR BLD AUTO: 39.6 % (ref 37–48.5)
HDLC SERPL-MCNC: 63 MG/DL (ref 40–75)
HDLC SERPL: 30.9 % (ref 20–50)
HGB BLD-MCNC: 12.7 G/DL (ref 12–16)
IMM GRANULOCYTES # BLD AUTO: 0.01 K/UL (ref 0–0.04)
IMM GRANULOCYTES NFR BLD AUTO: 0.1 % (ref 0–0.5)
LDLC SERPL CALC-MCNC: 127.2 MG/DL (ref 63–159)
LYMPHOCYTES # BLD AUTO: 2.7 K/UL (ref 1–4.8)
LYMPHOCYTES NFR BLD: 39.3 % (ref 18–48)
MCH RBC QN AUTO: 29.3 PG (ref 27–31)
MCHC RBC AUTO-ENTMCNC: 32.1 G/DL (ref 32–36)
MCV RBC AUTO: 92 FL (ref 82–98)
MONOCYTES # BLD AUTO: 0.3 K/UL (ref 0.3–1)
MONOCYTES NFR BLD: 4.1 % (ref 4–15)
NEUTROPHILS # BLD AUTO: 3.8 K/UL (ref 1.8–7.7)
NEUTROPHILS NFR BLD: 54.7 % (ref 38–73)
NONHDLC SERPL-MCNC: 141 MG/DL
NRBC BLD-RTO: 0 /100 WBC
PLATELET # BLD AUTO: 307 K/UL (ref 150–450)
PMV BLD AUTO: 10.5 FL (ref 9.2–12.9)
POTASSIUM SERPL-SCNC: 4.2 MMOL/L (ref 3.5–5.1)
PROT SERPL-MCNC: 7.8 G/DL (ref 6–8.4)
RBC # BLD AUTO: 4.33 M/UL (ref 4–5.4)
SODIUM SERPL-SCNC: 140 MMOL/L (ref 136–145)
TRIGL SERPL-MCNC: 69 MG/DL (ref 30–150)
TSH SERPL DL<=0.005 MIU/L-ACNC: 2.01 UIU/ML (ref 0.4–4)
WBC # BLD AUTO: 6.9 K/UL (ref 3.9–12.7)

## 2023-10-17 PROCEDURE — 85025 COMPLETE CBC W/AUTO DIFF WBC: CPT | Performed by: NURSE PRACTITIONER

## 2023-10-17 PROCEDURE — 1160F RVW MEDS BY RX/DR IN RCRD: CPT | Mod: CPTII,S$GLB,, | Performed by: NURSE PRACTITIONER

## 2023-10-17 PROCEDURE — 3008F BODY MASS INDEX DOCD: CPT | Mod: CPTII,S$GLB,, | Performed by: NURSE PRACTITIONER

## 2023-10-17 PROCEDURE — 3074F PR MOST RECENT SYSTOLIC BLOOD PRESSURE < 130 MM HG: ICD-10-PCS | Mod: CPTII,S$GLB,, | Performed by: NURSE PRACTITIONER

## 2023-10-17 PROCEDURE — 99999 PR PBB SHADOW E&M-EST. PATIENT-LVL V: ICD-10-PCS | Mod: PBBFAC,,, | Performed by: NURSE PRACTITIONER

## 2023-10-17 PROCEDURE — 80061 LIPID PANEL: CPT | Performed by: NURSE PRACTITIONER

## 2023-10-17 PROCEDURE — 80053 COMPREHEN METABOLIC PANEL: CPT | Performed by: NURSE PRACTITIONER

## 2023-10-17 PROCEDURE — 99395 PREV VISIT EST AGE 18-39: CPT | Mod: S$GLB,,, | Performed by: NURSE PRACTITIONER

## 2023-10-17 PROCEDURE — 99999 PR PBB SHADOW E&M-EST. PATIENT-LVL V: CPT | Mod: PBBFAC,,, | Performed by: NURSE PRACTITIONER

## 2023-10-17 PROCEDURE — 36415 COLL VENOUS BLD VENIPUNCTURE: CPT | Mod: PO | Performed by: NURSE PRACTITIONER

## 2023-10-17 PROCEDURE — 3074F SYST BP LT 130 MM HG: CPT | Mod: CPTII,S$GLB,, | Performed by: NURSE PRACTITIONER

## 2023-10-17 PROCEDURE — 3078F DIAST BP <80 MM HG: CPT | Mod: CPTII,S$GLB,, | Performed by: NURSE PRACTITIONER

## 2023-10-17 PROCEDURE — 1160F PR REVIEW ALL MEDS BY PRESCRIBER/CLIN PHARMACIST DOCUMENTED: ICD-10-PCS | Mod: CPTII,S$GLB,, | Performed by: NURSE PRACTITIONER

## 2023-10-17 PROCEDURE — 99395 PR PREVENTIVE VISIT,EST,18-39: ICD-10-PCS | Mod: S$GLB,,, | Performed by: NURSE PRACTITIONER

## 2023-10-17 PROCEDURE — 1159F PR MEDICATION LIST DOCUMENTED IN MEDICAL RECORD: ICD-10-PCS | Mod: CPTII,S$GLB,, | Performed by: NURSE PRACTITIONER

## 2023-10-17 PROCEDURE — 3008F PR BODY MASS INDEX (BMI) DOCUMENTED: ICD-10-PCS | Mod: CPTII,S$GLB,, | Performed by: NURSE PRACTITIONER

## 2023-10-17 PROCEDURE — 3078F PR MOST RECENT DIASTOLIC BLOOD PRESSURE < 80 MM HG: ICD-10-PCS | Mod: CPTII,S$GLB,, | Performed by: NURSE PRACTITIONER

## 2023-10-17 PROCEDURE — 84443 ASSAY THYROID STIM HORMONE: CPT | Performed by: NURSE PRACTITIONER

## 2023-10-17 PROCEDURE — 1159F MED LIST DOCD IN RCRD: CPT | Mod: CPTII,S$GLB,, | Performed by: NURSE PRACTITIONER

## 2023-10-17 RX ORDER — HYDROXYZINE HYDROCHLORIDE 25 MG/1
25 TABLET, FILM COATED ORAL NIGHTLY PRN
Qty: 30 TABLET | Refills: 2 | Status: SHIPPED | OUTPATIENT
Start: 2023-10-17 | End: 2023-12-13 | Stop reason: SDUPTHER

## 2023-10-17 RX ORDER — SERTRALINE HYDROCHLORIDE 50 MG/1
50 TABLET, FILM COATED ORAL DAILY
Qty: 90 TABLET | Refills: 3 | Status: SHIPPED | OUTPATIENT
Start: 2023-10-17 | End: 2024-02-08

## 2023-10-17 NOTE — PATIENT INSTRUCTIONS
Ray,     We are always striving for excellence. Should you receive a patient experience survey via text message, electronically, or by mail, we would appreciate if you would take a few moments to give us your feedback. These surveys let us know our strengths as well as areas of opportunity for improvement to better serve you.    Thank you for your time,  Kendra Fernando LPN      Your test results will be communicated to you via : My [facility], Telephone or Letter.   If you have not received your test results in one week, please contact the clinic at [phone].

## 2023-10-17 NOTE — PROGRESS NOTES
Subjective:       Patient ID: Ray Ugarte is a 38 y.o. female.    Chief Complaint: Annual Exam  Ray Ugarte presents today for routine annual exam. Last seen in 2022 by PCP, GIOVANNA Mccoy MD. This is her first visit with me.     Ms. Ugarte is generally well. Denies abnormal vaginal bleeding, vaginal discharge, itching, or burning. No dysuria or hematuria.     Anxiety  Presents for initial visit. Onset was at an unknown time. The problem has been gradually worsening. Symptoms include decreased concentration, depressed mood, insomnia and nervous/anxious behavior. Patient reports no chest pain, dizziness, nausea, palpitations or shortness of breath. Symptoms occur most days. The severity of symptoms is causing significant distress. Nothing aggravates the symptoms. The quality of sleep is poor.     There are no known risk factors. There is no history of anxiety/panic attacks, CAD, depression or hyperthyroidism. Past treatments include nothing.     Patient Active Problem List   Diagnosis    H/O  section    Instability of pelvis or thigh joint    Weakness of both hips    Acetabular labrum tear       Current Outpatient Medications:     clobetasoL (TEMOVATE) 0.05 % cream, Apply topically 2 (two) times daily. For hand dermatitis, Disp: 45 g, Rfl: 3    multivitamin capsule, Take 1 capsule by mouth once daily., Disp: , Rfl:     norgestimate-ethinyl estradioL (ESTARYLLA) 0.25-35 mg-mcg per tablet, Take 1 tablet by mouth once daily., Disp: 84 tablet, Rfl: 3    hydrOXYzine HCL (ATARAX) 25 MG tablet, Take 1 tablet (25 mg total) by mouth nightly as needed for Anxiety., Disp: 30 tablet, Rfl: 2    sertraline (ZOLOFT) 50 MG tablet, Take 1 tablet (50 mg total) by mouth once daily., Disp: 90 tablet, Rfl: 3    The following portions of the patient's history were reviewed and updated as appropriate: allergies, past family history, past medical history, past social history and past surgical history.    Review  "of Systems   Constitutional:  Negative for appetite change, fatigue, fever and unexpected weight change.   HENT:  Negative for hearing loss, rhinorrhea, sneezing, sore throat and trouble swallowing.    Eyes:  Positive for discharge. Negative for visual disturbance.   Respiratory:  Negative for cough, shortness of breath and wheezing.    Cardiovascular:  Negative for chest pain and palpitations.   Gastrointestinal:  Negative for abdominal pain, constipation, diarrhea, nausea and vomiting.   Genitourinary:  Negative for difficulty urinating, dysuria, frequency and hematuria.   Musculoskeletal:  Positive for arthralgias. Negative for myalgias.   Neurological:  Negative for dizziness, weakness and numbness.   Psychiatric/Behavioral:  Positive for decreased concentration. Negative for sleep disturbance. The patient is nervous/anxious and has insomnia.        Objective:      /68 (BP Location: Left arm, Patient Position: Sitting, BP Method: Large (Automatic))   Pulse 68   Resp 16   Ht 5' 6" (1.676 m)   Wt 67.8 kg (149 lb 6.4 oz)   BMI 24.11 kg/m²     Physical Exam  Constitutional:       General: She is not in acute distress.     Appearance: Normal appearance.   HENT:      Head: Normocephalic and atraumatic.   Eyes:      Extraocular Movements: Extraocular movements intact.      Pupils: Pupils are equal, round, and reactive to light.   Cardiovascular:      Rate and Rhythm: Normal rate and regular rhythm.      Pulses: Normal pulses.      Heart sounds: Normal heart sounds.   Pulmonary:      Effort: Pulmonary effort is normal.      Breath sounds: Normal breath sounds.   Abdominal:      Palpations: Abdomen is soft.   Musculoskeletal:         General: No swelling or deformity. Normal range of motion.      Cervical back: Normal range of motion and neck supple.      Right knee: No swelling or crepitus. Normal range of motion. No tenderness. Normal alignment.   Skin:     General: Skin is warm and dry.      Capillary " Refill: Capillary refill takes less than 2 seconds.   Neurological:      General: No focal deficit present.      Mental Status: She is alert and oriented to person, place, and time.      Coordination: Coordination normal.      Gait: Gait normal.   Psychiatric:         Mood and Affect: Mood normal.         Behavior: Behavior normal.         Assessment:       1. Annual physical exam    2. Obstruction of left tear duct    3. Chronic pain of right knee    4. Anxiety and depression        Plan:   Ray was seen today for annual exam.    Diagnoses and all orders for this visit:    Annual physical exam  -     Lipid Panel; Future  -     Comprehensive Metabolic Panel; Future  -     CBC Auto Differential; Future  -     TSH; Future    Obstruction of left tear duct  -     Ambulatory referral/consult to Ophthalmology; Future    Chronic pain of right knee  -     X-Ray Knee 3 View Right; Future    Anxiety and depression  -     sertraline (ZOLOFT) 50 MG tablet; Take 1 tablet (50 mg total) by mouth once daily.  -     hydrOXYzine HCL (ATARAX) 25 MG tablet; Take 1 tablet (25 mg total) by mouth nightly as needed for Anxiety.

## 2023-10-18 ENCOUNTER — TELEPHONE (OUTPATIENT)
Dept: OPHTHALMOLOGY | Facility: CLINIC | Age: 38
End: 2023-10-18
Payer: COMMERCIAL

## 2023-10-18 DIAGNOSIS — H04.552 NLDO, ACQUIRED (NASOLACRIMAL DUCT OBSTRUCTION), LEFT: Primary | ICD-10-CM

## 2023-10-20 ENCOUNTER — HOSPITAL ENCOUNTER (OUTPATIENT)
Dept: RADIOLOGY | Facility: HOSPITAL | Age: 38
Discharge: HOME | End: 2023-10-20
Attending: NURSE PRACTITIONER
Payer: COMMERCIAL

## 2023-10-20 DIAGNOSIS — M25.561 CHRONIC PAIN OF RIGHT KNEE: ICD-10-CM

## 2023-10-20 DIAGNOSIS — G89.29 CHRONIC PAIN OF RIGHT KNEE: ICD-10-CM

## 2023-10-20 PROCEDURE — 73562 X-RAY EXAM OF KNEE 3: CPT | Mod: TC,PO,RT

## 2023-10-20 PROCEDURE — 73562 X-RAY EXAM OF KNEE 3: CPT | Mod: 26,RT,, | Performed by: RADIOLOGY

## 2023-10-20 PROCEDURE — 73562 XR KNEE 3 VIEW RIGHT: ICD-10-PCS | Mod: 26,RT,, | Performed by: RADIOLOGY

## 2023-10-23 ENCOUNTER — TELEPHONE (OUTPATIENT)
Dept: ORTHOPEDICS | Facility: CLINIC | Age: 38
End: 2023-10-23
Payer: COMMERCIAL

## 2023-10-23 DIAGNOSIS — G89.29 CHRONIC PAIN OF RIGHT KNEE: Primary | ICD-10-CM

## 2023-10-23 DIAGNOSIS — M25.561 CHRONIC PAIN OF RIGHT KNEE: Primary | ICD-10-CM

## 2023-11-14 ENCOUNTER — OFFICE VISIT (OUTPATIENT)
Dept: SPORTS MEDICINE | Facility: CLINIC | Age: 38
End: 2023-11-14
Payer: COMMERCIAL

## 2023-11-14 ENCOUNTER — HOSPITAL ENCOUNTER (OUTPATIENT)
Dept: RADIOLOGY | Facility: HOSPITAL | Age: 38
Discharge: HOME | End: 2023-11-14
Attending: STUDENT IN AN ORGANIZED HEALTH CARE EDUCATION/TRAINING PROGRAM
Payer: COMMERCIAL

## 2023-11-14 VITALS
HEIGHT: 66 IN | DIASTOLIC BLOOD PRESSURE: 72 MMHG | BODY MASS INDEX: 24.45 KG/M2 | WEIGHT: 152.13 LBS | HEART RATE: 69 BPM | SYSTOLIC BLOOD PRESSURE: 110 MMHG

## 2023-11-14 DIAGNOSIS — M25.561 CHRONIC PAIN OF RIGHT KNEE: ICD-10-CM

## 2023-11-14 DIAGNOSIS — S83.91XA SPRAIN OF RIGHT KNEE, UNSPECIFIED LIGAMENT, INITIAL ENCOUNTER: Primary | ICD-10-CM

## 2023-11-14 DIAGNOSIS — G89.29 CHRONIC PAIN OF RIGHT KNEE: ICD-10-CM

## 2023-11-14 PROCEDURE — 99999 PR PBB SHADOW E&M-EST. PATIENT-LVL IV: CPT | Mod: PBBFAC,,, | Performed by: STUDENT IN AN ORGANIZED HEALTH CARE EDUCATION/TRAINING PROGRAM

## 2023-11-14 PROCEDURE — 1160F RVW MEDS BY RX/DR IN RCRD: CPT | Mod: CPTII,S$GLB,, | Performed by: STUDENT IN AN ORGANIZED HEALTH CARE EDUCATION/TRAINING PROGRAM

## 2023-11-14 PROCEDURE — 73564 X-RAY EXAM KNEE 4 OR MORE: CPT | Mod: 26,,, | Performed by: RADIOLOGY

## 2023-11-14 PROCEDURE — 73564 X-RAY EXAM KNEE 4 OR MORE: CPT | Mod: TC,50

## 2023-11-14 PROCEDURE — 99999 PR PBB SHADOW E&M-EST. PATIENT-LVL IV: ICD-10-PCS | Mod: PBBFAC,,, | Performed by: STUDENT IN AN ORGANIZED HEALTH CARE EDUCATION/TRAINING PROGRAM

## 2023-11-14 PROCEDURE — 3078F PR MOST RECENT DIASTOLIC BLOOD PRESSURE < 80 MM HG: ICD-10-PCS | Mod: CPTII,S$GLB,, | Performed by: STUDENT IN AN ORGANIZED HEALTH CARE EDUCATION/TRAINING PROGRAM

## 2023-11-14 PROCEDURE — 3074F PR MOST RECENT SYSTOLIC BLOOD PRESSURE < 130 MM HG: ICD-10-PCS | Mod: CPTII,S$GLB,, | Performed by: STUDENT IN AN ORGANIZED HEALTH CARE EDUCATION/TRAINING PROGRAM

## 2023-11-14 PROCEDURE — 3074F SYST BP LT 130 MM HG: CPT | Mod: CPTII,S$GLB,, | Performed by: STUDENT IN AN ORGANIZED HEALTH CARE EDUCATION/TRAINING PROGRAM

## 2023-11-14 PROCEDURE — 99213 OFFICE O/P EST LOW 20 MIN: CPT | Mod: S$GLB,,, | Performed by: STUDENT IN AN ORGANIZED HEALTH CARE EDUCATION/TRAINING PROGRAM

## 2023-11-14 PROCEDURE — 1160F PR REVIEW ALL MEDS BY PRESCRIBER/CLIN PHARMACIST DOCUMENTED: ICD-10-PCS | Mod: CPTII,S$GLB,, | Performed by: STUDENT IN AN ORGANIZED HEALTH CARE EDUCATION/TRAINING PROGRAM

## 2023-11-14 PROCEDURE — 99213 PR OFFICE/OUTPT VISIT, EST, LEVL III, 20-29 MIN: ICD-10-PCS | Mod: S$GLB,,, | Performed by: STUDENT IN AN ORGANIZED HEALTH CARE EDUCATION/TRAINING PROGRAM

## 2023-11-14 PROCEDURE — 3008F BODY MASS INDEX DOCD: CPT | Mod: CPTII,S$GLB,, | Performed by: STUDENT IN AN ORGANIZED HEALTH CARE EDUCATION/TRAINING PROGRAM

## 2023-11-14 PROCEDURE — 1159F PR MEDICATION LIST DOCUMENTED IN MEDICAL RECORD: ICD-10-PCS | Mod: CPTII,S$GLB,, | Performed by: STUDENT IN AN ORGANIZED HEALTH CARE EDUCATION/TRAINING PROGRAM

## 2023-11-14 PROCEDURE — 3008F PR BODY MASS INDEX (BMI) DOCUMENTED: ICD-10-PCS | Mod: CPTII,S$GLB,, | Performed by: STUDENT IN AN ORGANIZED HEALTH CARE EDUCATION/TRAINING PROGRAM

## 2023-11-14 PROCEDURE — 73564 XR KNEE ORTHO BILAT WITH FLEXION: ICD-10-PCS | Mod: 26,,, | Performed by: RADIOLOGY

## 2023-11-14 PROCEDURE — 3078F DIAST BP <80 MM HG: CPT | Mod: CPTII,S$GLB,, | Performed by: STUDENT IN AN ORGANIZED HEALTH CARE EDUCATION/TRAINING PROGRAM

## 2023-11-14 PROCEDURE — 1159F MED LIST DOCD IN RCRD: CPT | Mod: CPTII,S$GLB,, | Performed by: STUDENT IN AN ORGANIZED HEALTH CARE EDUCATION/TRAINING PROGRAM

## 2023-11-14 NOTE — PROGRESS NOTES
Subjective:          Chief Complaint: Ray Ugarte is a 38 y.o. female who had concerns including Pain of the Right Knee.    Ray Ugarte is a 38 y.o. female presents today for evaluation of her right knee.  This been present for some time when she was squatting she felt a pop in the medial aspect of the knee.  The pain was located all medially and increased with knee flexion as well as going from a sit to stand position.  Denies any mechanical symptoms or instability.  Occasional light stiffness.  She did do physical therapy which resolved her symptoms.  He is not any corticosteroid injections.  Currently, she is completely asymptomatic.  Past Medical History:   Diagnosis Date    Abnormal Pap smear of cervix ?    No procedures necessary. Repeat pap normal    Allergy     Eczema        Current Outpatient Medications on File Prior to Visit   Medication Sig Dispense Refill    clobetasoL (TEMOVATE) 0.05 % cream Apply topically 2 (two) times daily. For hand dermatitis 45 g 3    hydrOXYzine HCL (ATARAX) 25 MG tablet Take 1 tablet (25 mg total) by mouth nightly as needed for Anxiety. 30 tablet 2    multivitamin capsule Take 1 capsule by mouth once daily.      norgestimate-ethinyl estradioL (ESTARYLLA) 0.25-35 mg-mcg per tablet Take 1 tablet by mouth once daily. 84 tablet 3    sertraline (ZOLOFT) 50 MG tablet Take 1 tablet (50 mg total) by mouth once daily. 90 tablet 3     No current facility-administered medications on file prior to visit.       Past Surgical History:   Procedure Laterality Date     SECTION  04/24/2015    x1    LASIK      TONSILLECTOMY         Family History   Problem Relation Age of Onset    No Known Problems Mother     Pancreatic cancer Father 75    Allergies Brother     Breast cancer Neg Hx     Colon cancer Neg Hx     Ovarian cancer Neg Hx        Social History     Socioeconomic History    Marital status:    Tobacco Use    Smoking status: Never      Passive exposure: Never    Smokeless tobacco: Never   Substance and Sexual Activity    Alcohol use: Yes     Comment: occasionally    Drug use: No    Sexual activity: Yes     Partners: Male     Birth control/protection: None   Social History Narrative    Works as     Lives with  and daughter      Review of Systems   Constitutional: Negative.   HENT: Negative.     Eyes: Negative.    Cardiovascular: Negative.    Respiratory: Negative.     Endocrine: Negative.    Hematologic/Lymphatic: Negative.    Skin: Negative.    Musculoskeletal:  Negative for joint pain, joint swelling and stiffness.   Neurological: Negative.    Psychiatric/Behavioral: Negative.     Allergic/Immunologic: Negative.      Pain Related Questions  Over the past 3 days, what was your average pain during activity? (I.e. running, jogging, walking, climbing stairs, getting dressed, ect.): 0  Over the past 3 days, what was your highest pain level?: 0  Over the past 3 days, what was your lowest pain level? : 0    Other  Was the patient's HEIGHT measured or patient reported?: Patient Reported  Was the patient's WEIGHT measured or patient reported?: Measured      Objective:        General: Ray is well-developed, well-nourished, appears stated age, in no acute distress, alert and oriented to time, place and person.     General    Nursing note and vitals reviewed.  Constitutional: She is oriented to person, place, and time. She appears well-developed and well-nourished. No distress.   HENT:   Head: Normocephalic and atraumatic.   Nose: Nose normal.   Eyes: EOM are normal.   Cardiovascular:  Intact distal pulses.            Pulmonary/Chest: Effort normal. No respiratory distress.   Neurological: She is alert and oriented to person, place, and time.   Psychiatric: She has a normal mood and affect. Her behavior is normal. Judgment and thought content normal.     General Musculoskeletal Exam   Gait: normal       Right Knee Exam     Inspection    Erythema: absent  Scars: absent  Swelling: absent  Effusion: absent  Deformity: absent  Bruising: absent    Tenderness   The patient is experiencing no tenderness.     Range of Motion   Extension:  -5   Flexion:  140     Tests   Meniscus   Parth:  Medial - negative Lateral - negative  Ligament Examination   Lachman: normal (-1 to 2mm)   PCL-Posterior Drawer: normal (0 to 2mm)     MCL - Valgus: normal (0 to 2mm)  LCL - Varus: normal  Patella   Patellar apprehension: negative  Passive Patellar Tilt: neutral  Patellar Tracking: normal  Patellar Grind: negative    Other   Sensation: normal    Left Knee Exam     Inspection   Erythema: absent  Scars: absent  Swelling: absent  Effusion: absent  Deformity: absent  Bruising: absent    Tenderness   The patient is experiencing no tenderness.     Range of Motion   Extension:  -5   Flexion:  140     Tests   Meniscus   Parth:  Medial - negative Lateral - negative  Stability   Lachman: normal (-1 to 2mm)   PCL-Posterior Drawer: normal (0 to 2mm)  MCL - Valgus: normal (0 to 2mm)  LCL - Varus: normal (0 to 2mm)  Patella   Patellar apprehension: negative  Passive Patellar Tilt: neutral  Patellar Tracking: normal  Patellar Grind: negative    Other   Sensation: normal    Muscle Strength   Right Lower Extremity   Quadriceps:  5/5   Hamstrin/5   Left Lower Extremity   Quadriceps:  5/5   Hamstrin/5     Vascular Exam     Right Pulses  Dorsalis Pedis:      2+  Posterior Tibial:      2+        Left Pulses  Dorsalis Pedis:      2+  Posterior Tibial:      2+        Edema  Right Lower Leg: absent  Left Lower Leg: absent      Imaging:  X-rays of the bilateral knees from 2023 personally viewed by me on that day.  These include weight-bearing AP, PA flexion, lateral, Merchant views.  Joint space is well-maintained.  No fracture.  No bony abnormality.      Assessment:     Ray Ugarte is a 38 y.o. female with right knee pain that has since resolved.  Encounter  Diagnoses   Name Primary?    Chronic pain of right knee     Sprain of right knee, unspecified ligament, initial encounter Yes          Plan:           , we will continue to observe this.  She can continue activities as tolerated.  Return to clinic on an as-needed basis.

## 2023-12-13 DIAGNOSIS — F41.9 ANXIETY AND DEPRESSION: ICD-10-CM

## 2023-12-13 DIAGNOSIS — F32.A ANXIETY AND DEPRESSION: ICD-10-CM

## 2023-12-14 RX ORDER — HYDROXYZINE HYDROCHLORIDE 25 MG/1
25 TABLET, FILM COATED ORAL NIGHTLY PRN
Qty: 30 TABLET | Refills: 2 | Status: SHIPPED | OUTPATIENT
Start: 2023-12-14 | End: 2024-02-08

## 2024-02-05 ENCOUNTER — TELEPHONE (OUTPATIENT)
Dept: FAMILY MEDICINE | Facility: CLINIC | Age: 39
End: 2024-02-05
Payer: COMMERCIAL

## 2024-02-05 NOTE — TELEPHONE ENCOUNTER
"----- Message from Clemencia Yasmany sent at 2/5/2024  1:13 PM CST -----  Regarding: Appointment  "Type:  Patient Call Back    Who Called:PT    What is the reqeust in detail:Requesting call back to confirmed her appt on 2/20 doctor will be able to do clearance for pt surgery on 2/27. Please advise    Can the clinic reply by [facility]?no     Best Call Back Number:[phone]      Additional Information:            "

## 2024-02-06 ENCOUNTER — TELEPHONE (OUTPATIENT)
Dept: OBSTETRICS AND GYNECOLOGY | Facility: CLINIC | Age: 39
End: 2024-02-06
Payer: COMMERCIAL

## 2024-02-06 ENCOUNTER — PATIENT OUTREACH (OUTPATIENT)
Dept: ADMINISTRATIVE | Facility: HOSPITAL | Age: 39
End: 2024-02-06
Payer: COMMERCIAL

## 2024-02-06 ENCOUNTER — TELEPHONE (OUTPATIENT)
Dept: FAMILY MEDICINE | Facility: CLINIC | Age: 39
End: 2024-02-06
Payer: COMMERCIAL

## 2024-02-06 NOTE — TELEPHONE ENCOUNTER
----- Message from Jeovanny Lópeze sent at 2/6/2024  1:10 PM CST -----  Regarding: Aoppointment  Name of Who is Calling:  Patient          What is the request in detail:  Patient stated she need a clearance for a surgery for 02/272024 she stated she needs an appointment for the week of 02/20/12024. Patient has an appointment for 02/20/2024 with EDINSON Kapoor and she stated she could not make an appointment a NP            Can the clinic reply by [facility]: No            What [facility] Back [facility]: [phone] f

## 2024-02-06 NOTE — TELEPHONE ENCOUNTER
----- Message from Chantale Crawford sent at 2/6/2024 12:16 PM CST -----  Contact: KATE VALLADARES [[id number]]  Type:  Patient Returning Call      Who Called :  KATE VALLADARES [[id number]]       Who Left Message for Patient: Tiffany Miranda MA      Does the patient know what this is regarding?: Yes      Would the patient rather a call back or a response via [facility]?  Call back       Best Call Back Number: [phone] ([place])       Additional Information:

## 2024-02-07 ENCOUNTER — PATIENT MESSAGE (OUTPATIENT)
Dept: OPHTHALMOLOGY | Facility: CLINIC | Age: 39
End: 2024-02-07
Payer: COMMERCIAL

## 2024-02-08 ENCOUNTER — OFFICE VISIT (OUTPATIENT)
Dept: OBSTETRICS AND GYNECOLOGY | Facility: CLINIC | Age: 39
End: 2024-02-08
Attending: OBSTETRICS & GYNECOLOGY
Payer: COMMERCIAL

## 2024-02-08 ENCOUNTER — LAB VISIT (OUTPATIENT)
Dept: LAB | Facility: OTHER | Age: 39
End: 2024-02-08
Attending: OBSTETRICS & GYNECOLOGY
Payer: COMMERCIAL

## 2024-02-08 ENCOUNTER — TELEPHONE (OUTPATIENT)
Dept: OBSTETRICS AND GYNECOLOGY | Facility: CLINIC | Age: 39
End: 2024-02-08

## 2024-02-08 ENCOUNTER — TELEPHONE (OUTPATIENT)
Dept: OPHTHALMOLOGY | Facility: CLINIC | Age: 39
End: 2024-02-08
Payer: COMMERCIAL

## 2024-02-08 ENCOUNTER — TELEPHONE (OUTPATIENT)
Dept: FAMILY MEDICINE | Facility: CLINIC | Age: 39
End: 2024-02-08
Payer: COMMERCIAL

## 2024-02-08 ENCOUNTER — NURSE TRIAGE (OUTPATIENT)
Dept: ADMINISTRATIVE | Facility: CLINIC | Age: 39
End: 2024-02-08
Payer: COMMERCIAL

## 2024-02-08 VITALS
DIASTOLIC BLOOD PRESSURE: 70 MMHG | HEIGHT: 66 IN | BODY MASS INDEX: 24.77 KG/M2 | SYSTOLIC BLOOD PRESSURE: 120 MMHG | WEIGHT: 154.13 LBS

## 2024-02-08 DIAGNOSIS — O20.0 THREATENED ABORTION, ANTEPARTUM: ICD-10-CM

## 2024-02-08 DIAGNOSIS — O03.9 MISCARRIAGE: Primary | ICD-10-CM

## 2024-02-08 DIAGNOSIS — O20.9 VAGINAL BLEEDING AFFECTING EARLY PREGNANCY: ICD-10-CM

## 2024-02-08 DIAGNOSIS — N93.9 VAGINAL BLEEDING: Primary | ICD-10-CM

## 2024-02-08 DIAGNOSIS — H04.552 NLDO, ACQUIRED (NASOLACRIMAL DUCT OBSTRUCTION), LEFT: Primary | ICD-10-CM

## 2024-02-08 PROBLEM — Z98.891 H/O CESAREAN SECTION: Status: RESOLVED | Noted: 2019-07-19 | Resolved: 2024-02-08

## 2024-02-08 LAB
ABO + RH BLD: NORMAL
B-HCG UR QL: NEGATIVE
BASOPHILS # BLD AUTO: 0.03 K/UL (ref 0–0.2)
BASOPHILS NFR BLD: 0.4 % (ref 0–1.9)
BLD GP AB SCN CELLS X3 SERPL QL: NORMAL
CTP QC/QA: YES
DIFFERENTIAL METHOD BLD: NORMAL
EOSINOPHIL # BLD AUTO: 0.1 K/UL (ref 0–0.5)
EOSINOPHIL NFR BLD: 1.1 % (ref 0–8)
ERYTHROCYTE [DISTWIDTH] IN BLOOD BY AUTOMATED COUNT: 13.2 % (ref 11.5–14.5)
HCG INTACT+B SERPL-ACNC: 8.5 MIU/ML
HCT VFR BLD AUTO: 38 % (ref 37–48.5)
HGB BLD-MCNC: 12.6 G/DL (ref 12–16)
IMM GRANULOCYTES # BLD AUTO: 0.01 K/UL (ref 0–0.04)
IMM GRANULOCYTES NFR BLD AUTO: 0.1 % (ref 0–0.5)
LYMPHOCYTES # BLD AUTO: 2.4 K/UL (ref 1–4.8)
LYMPHOCYTES NFR BLD: 33.1 % (ref 18–48)
MCH RBC QN AUTO: 29.4 PG (ref 27–31)
MCHC RBC AUTO-ENTMCNC: 33.2 G/DL (ref 32–36)
MCV RBC AUTO: 89 FL (ref 82–98)
MONOCYTES # BLD AUTO: 0.4 K/UL (ref 0.3–1)
MONOCYTES NFR BLD: 5.3 % (ref 4–15)
NEUTROPHILS # BLD AUTO: 4.3 K/UL (ref 1.8–7.7)
NEUTROPHILS NFR BLD: 60 % (ref 38–73)
NRBC BLD-RTO: 0 /100 WBC
PLATELET # BLD AUTO: 265 K/UL (ref 150–450)
PMV BLD AUTO: 9.7 FL (ref 9.2–12.9)
PROGEST SERPL-MCNC: 0.7 NG/ML
RBC # BLD AUTO: 4.29 M/UL (ref 4–5.4)
SPECIMEN OUTDATE: NORMAL
WBC # BLD AUTO: 7.18 K/UL (ref 3.9–12.7)

## 2024-02-08 PROCEDURE — 1160F RVW MEDS BY RX/DR IN RCRD: CPT | Mod: CPTII,S$GLB,, | Performed by: OBSTETRICS & GYNECOLOGY

## 2024-02-08 PROCEDURE — 99999 PR PBB SHADOW E&M-EST. PATIENT-LVL III: CPT | Mod: PBBFAC,,, | Performed by: OBSTETRICS & GYNECOLOGY

## 2024-02-08 PROCEDURE — 99213 OFFICE O/P EST LOW 20 MIN: CPT | Mod: S$GLB,,, | Performed by: OBSTETRICS & GYNECOLOGY

## 2024-02-08 PROCEDURE — 85025 COMPLETE CBC W/AUTO DIFF WBC: CPT | Performed by: OBSTETRICS & GYNECOLOGY

## 2024-02-08 PROCEDURE — 1159F MED LIST DOCD IN RCRD: CPT | Mod: CPTII,S$GLB,, | Performed by: OBSTETRICS & GYNECOLOGY

## 2024-02-08 PROCEDURE — 84144 ASSAY OF PROGESTERONE: CPT | Performed by: OBSTETRICS & GYNECOLOGY

## 2024-02-08 PROCEDURE — 81025 URINE PREGNANCY TEST: CPT | Mod: S$GLB,,, | Performed by: OBSTETRICS & GYNECOLOGY

## 2024-02-08 PROCEDURE — 3074F SYST BP LT 130 MM HG: CPT | Mod: CPTII,S$GLB,, | Performed by: OBSTETRICS & GYNECOLOGY

## 2024-02-08 PROCEDURE — 86901 BLOOD TYPING SEROLOGIC RH(D): CPT | Performed by: OBSTETRICS & GYNECOLOGY

## 2024-02-08 PROCEDURE — 36415 COLL VENOUS BLD VENIPUNCTURE: CPT | Performed by: OBSTETRICS & GYNECOLOGY

## 2024-02-08 PROCEDURE — 84702 CHORIONIC GONADOTROPIN TEST: CPT | Performed by: OBSTETRICS & GYNECOLOGY

## 2024-02-08 PROCEDURE — 3008F BODY MASS INDEX DOCD: CPT | Mod: CPTII,S$GLB,, | Performed by: OBSTETRICS & GYNECOLOGY

## 2024-02-08 PROCEDURE — 3078F DIAST BP <80 MM HG: CPT | Mod: CPTII,S$GLB,, | Performed by: OBSTETRICS & GYNECOLOGY

## 2024-02-08 NOTE — TELEPHONE ENCOUNTER
Discuss results- scheduled follow up lab draw 02/16/2024. Patient decline follow up visit- she would like to continue during weekly lab draw.

## 2024-02-08 NOTE — PROGRESS NOTES
SUBJECTIVE:   38 y.o. female   for bleeding in early pregnancy. Patient's last menstrual period was 2024 (approximate)..  She normally has regular periods.  Not using contraception for about 2 months.  Had + home UPT last week.  Started bleeding heavy this morning. Also with cramping and dizziness. She [place].       UPT+  EGA 5w1d  EDC 10-9-2024    Past Medical History:   Diagnosis Date    Abnormal Pap smear of cervix ?    No procedures necessary. Repeat pap normal    Allergy     Eczema      Past Surgical History:   Procedure Laterality Date     SECTION  04/24/2015    x1    LASIK      TONSILLECTOMY       Social History     Socioeconomic History    Marital status:    Tobacco Use    Smoking status: Never     Passive exposure: Never    Smokeless tobacco: Never   Substance and Sexual Activity    Alcohol use: Not Currently     Comment: occasionally    Drug use: No    Sexual activity: Yes     Partners: Male     Birth control/protection: None   Social History Narrative    Works as     Lives with  and daughter     Social Determinants of Health     Financial Resource Strain: Low Risk  (2024)    Overall Financial Resource Strain (CARDIA)     Difficulty of Paying Living Expenses: Not hard at all   Food Insecurity: No Food Insecurity (2024)    Hunger Vital Sign     Worried About Running Out of Food in the Last Year: Never true     Ran Out of Food in the Last Year: Never true   Transportation Needs: No Transportation Needs (2024)    PRAPARE - Transportation     Lack of Transportation (Medical): No     Lack of Transportation (Non-Medical): No   Physical Activity: Sufficiently Active (2024)    Exercise Vital Sign     Days of Exercise per Week: 7 days     Minutes of Exercise per Session: 30 min   Stress: Stress Concern Present (2024)    [place] [employer] - Occupational Stress Questionnaire      Feeling of Stress : Very much   Social Connections: Unknown (2024)    Social Connection and Isolation Panel [NHANES]     Frequency of Communication with Friends and Family: Three times a week     Frequency of Social Gatherings with Friends and Family: Once a week     Active Member of Clubs or Organizations: Yes     Attends Club or Organization Meetings: More than 4 times per year     Marital Status:    Housing Stability: Low Risk  (2024)    Housing Stability Vital Sign     Unable to Pay for Housing in the Last Year: No     Number of Places Lived in the Last Year: 1     Unstable Housing in the Last Year: No     Family History   Problem Relation Age of Onset    No Known Problems Mother     Pancreatic cancer Father 75    Allergies Brother     Breast cancer Neg Hx     Colon cancer Neg Hx     Ovarian cancer Neg Hx      OB History    Para Term  AB Living   3 2 2   1 2   SAB IAB Ectopic Multiple Live Births   1     0 2      # Outcome Date GA Lbr Bubba/2nd Weight Sex Delivery Anes PTL Lv   3 Term 20 39w6d  3.14 kg (6 lb 14.8 oz) F  EPI N JCARLOS   2 2018     SAB      1 Term 04/24/15 41w1d  4.054 kg (8 lb 15 oz) F CS-LTranv EPI N JCARLOS         Current Outpatient Medications   Medication Sig Dispense Refill    multivitamin capsule Take 1 capsule by mouth once daily.       No current facility-administered medications for this visit.     Allergies: Shellfish containing products     ROS:  Constitutional: no weight loss, weight gain, fever, fatigue  Eyes:  No vision changes, glasses/contacts  ENT/Mouth: No ulcers, sinus problems, ears ringing, headache  Cardiovascular: No inability to lie flat, chest pain, exercise intolerance, swelling, heart palpitations  Respiratory: No wheezing, coughing blood, shortness of breath, or cough  Gastrointestinal: No diarrhea, bloody stool, nausea/vomiting, constipation, gas, hemorrhoids  Genitourinary: No blood in urine, painful urination, urgency of urination,  "frequency of urination, incomplete emptying, incontinence, painful periods, heavy periods, vaginal discharge, vaginal odor, painful intercourse, sexual problems, bleeding after intercourse.  Musculoskeletal: No muscle weakness  Skin/Breast: No painful breasts, nipple discharge, masses, rash, ulcers  Neurological: No passing out, seizures, numbness, headache  Endocrine: No diabetes, hypothyroid, hyperthyroid, hot flashes, hair loss, abnormal hair growth, ance  Psychiatric: No depression, crying  Hematologic: No bruises, bleeding, swollen lymph nodes, anemia.      OBJECTIVE:   The patient appears well, alert, oriented x 3, in no distress.  /70 (BP Location: Left arm, Patient Position: Sitting, BP Method: Medium (Manual))   Ht 5' 6" (1.676 m)   Wt 69.9 kg (154 lb 1.6 oz)   LMP 2024 (Approximate)   BMI 24.87 kg/m²   NECK: no thyromegaly, trachea midline  SKIN: no acne, striae, hirsutism  CHEST: CTAB  CV: RRR  BREAST EXAM: breasts appear normal, no suspicious masses, no skin or nipple changes or axillary nodes  ABDOMEN: no hernias, masses, or hepatosplenomegaly  GENITALIA: normal external genitalia, no erythema, bloody discharge  URETHRA: normal urethra, normal urethral meatus  VAGINA: Normal  CERVIX: no lesions or cervical motion tenderness  UTERUS: normal size, contour, position, consistency, mobility, non-tender  ADNEXA: no mass, fullness, tenderness      ASSESSMENT:   1. Vaginal bleeding  POCT urine pregnancy      2. Vaginal bleeding affecting early pregnancy  CBC Auto Differential    Progesterone    HCG, Quantitative    CANCELED: Type & Screen - Ob Profile      3. Threatened , antepartum  CBC Auto Differential    Progesterone    HCG, Quantitative    CANCELED: Type & Screen - Ob Profile          PLAN:   Orders Placed This Encounter    CBC Auto Differential    Progesterone    HCG, Quantitative    POCT urine pregnancy     Discussed neg UPT. Will get BHCG.  Discussed bleeding in early pregnancy, " suspicious for early miscarriage. But possible this is bleeding in early viable pregnancy  Plan labs today. May f/u BHCG in 48 hours  Pain, bleeding precautions  Follow up after results

## 2024-02-08 NOTE — TELEPHONE ENCOUNTER
"Pt canceled pre op on 2/7 " stated she pregnant and doesn't want to do the pre op"  Pt called today wanting to be seeing for pre op surgery date 2/27, staff informed pt that pre op appointments have to be schedule within 30 days of surgery, pt asked if she can see another provider at the [facility] location to clear her for pre op, staff offer pt 2/19 appointment with Dr. Casanova but informed pt her surgery date would have to be reschedule due to not enough time between to get everything done before surgery. Pt stated she is currently having a miscarriage and doesn't want to cancel her surgery again due to being on the wait list for 2 years. Pt stated she will call and reschedule surgery and then schedule pre op appointment with  once she's given a new date.  "

## 2024-02-08 NOTE — TELEPHONE ENCOUNTER
"LMP 1/3. Recent +UPT last week. +Cramping in the middle of the night, went to restroom & noticed "a lot of blood" about 630am. Severe lower back pain. +intermittent cramping still.     Dispo-be seen within 4 hrs. Unable to schedule with specialty. Discussed options to be seen, [facility] on [address].       Reason for Disposition   [1] Constant abdominal pain AND [2] present > 2 hours    Additional Information   Negative: Shock suspected (e.g., cold/pale/clammy skin, too weak to stand, low BP, rapid pulse)   Negative: Difficult to awaken or acting confused (e.g., disoriented, slurred speech)   Negative: Passed out (i.e., lost consciousness, collapsed and was not responding)   Negative: Sounds like a life-threatening emergency to the triager   Negative: SEVERE abdominal pain   Negative: SEVERE vaginal bleeding (e.g., soaking 2 pads per hour or large blood clots and present 2 or more hours)   Negative: SEVERE dizziness (e.g., unable to stand, requires support to walk, feels like passing out)   Negative: [1] MODERATE vaginal bleeding (e.g., soaking 1 pad per hour; clots) AND [2] present > 6 hours   Negative: [1] MODERATE vaginal bleeding (e.g., soaking 1 pad per hour; clots) AND [2] pregnant > 12 weeks   Negative: Passed tissue (e.g., gray-white)   Negative: Shoulder pain   Negative: Pale skin (pallor) of new-onset or worsening   Negative: Patient sounds very sick or weak to the triager    Protocols used: Pregnancy - Vaginal Bleeding Less Than 20 Weeks EGA-A-AH    "

## 2024-02-08 NOTE — TELEPHONE ENCOUNTER
----- Message from Jeovanny Herndon sent at 2/8/2024  1:06 PM CST -----  Regarding: Pre Op  Name of Who is Calling:  Patient          What is the request in detail:  Patient stated she would guerline to schedule an appointment for a Pre Op appointment.            Can the clinic reply by [facility]: Yes            What Number to Call Back if not in [facility]: [phone]

## 2024-02-12 ENCOUNTER — PATIENT MESSAGE (OUTPATIENT)
Dept: FAMILY MEDICINE | Facility: CLINIC | Age: 39
End: 2024-02-12
Payer: COMMERCIAL

## 2024-02-16 ENCOUNTER — OFFICE VISIT (OUTPATIENT)
Dept: INTERNAL MEDICINE | Facility: CLINIC | Age: 39
End: 2024-02-16
Payer: COMMERCIAL

## 2024-02-16 VITALS
WEIGHT: 158.75 LBS | SYSTOLIC BLOOD PRESSURE: 110 MMHG | OXYGEN SATURATION: 97 % | HEART RATE: 85 BPM | DIASTOLIC BLOOD PRESSURE: 78 MMHG | BODY MASS INDEX: 25.62 KG/M2

## 2024-02-16 DIAGNOSIS — Z01.818 PRE-OP EVALUATION: Primary | ICD-10-CM

## 2024-02-16 PROCEDURE — 3074F SYST BP LT 130 MM HG: CPT | Mod: CPTII,S$GLB,, | Performed by: STUDENT IN AN ORGANIZED HEALTH CARE EDUCATION/TRAINING PROGRAM

## 2024-02-16 PROCEDURE — 99999 PR PBB SHADOW E&M-EST. PATIENT-LVL III: CPT | Mod: PBBFAC,,, | Performed by: STUDENT IN AN ORGANIZED HEALTH CARE EDUCATION/TRAINING PROGRAM

## 2024-02-16 PROCEDURE — 99213 OFFICE O/P EST LOW 20 MIN: CPT | Mod: S$GLB,,, | Performed by: STUDENT IN AN ORGANIZED HEALTH CARE EDUCATION/TRAINING PROGRAM

## 2024-02-16 PROCEDURE — 3008F BODY MASS INDEX DOCD: CPT | Mod: CPTII,S$GLB,, | Performed by: STUDENT IN AN ORGANIZED HEALTH CARE EDUCATION/TRAINING PROGRAM

## 2024-02-16 PROCEDURE — 1159F MED LIST DOCD IN RCRD: CPT | Mod: CPTII,S$GLB,, | Performed by: STUDENT IN AN ORGANIZED HEALTH CARE EDUCATION/TRAINING PROGRAM

## 2024-02-16 PROCEDURE — 3078F DIAST BP <80 MM HG: CPT | Mod: CPTII,S$GLB,, | Performed by: STUDENT IN AN ORGANIZED HEALTH CARE EDUCATION/TRAINING PROGRAM

## 2024-02-16 PROCEDURE — 1160F RVW MEDS BY RX/DR IN RCRD: CPT | Mod: CPTII,S$GLB,, | Performed by: STUDENT IN AN ORGANIZED HEALTH CARE EDUCATION/TRAINING PROGRAM

## 2024-02-16 RX ORDER — COVID-19 VACCINE, MRNA 50 UG/.5ML
INJECTION, SUSPENSION INTRAMUSCULAR
COMMUNITY
Start: 2023-10-30

## 2024-02-16 RX ORDER — INFLUENZA A VIRUS A/VICTORIA/4897/2022 IVR-238 (H1N1) ANTIGEN (FORMALDEHYDE INACTIVATED), INFLUENZA A VIRUS A/DARWIN/9/2021 SAN-010 (H3N2) ANTIGEN (FORMALDEHYDE INACTIVATED), INFLUENZA B VIRUS B/PHUKET/3073/2013 ANTIGEN (FORMALDEHYDE INACTIVATED), AND INFLUENZA B VIRUS B/MICHIGAN/01/2021 ANTIGEN (FORMALDEHYDE INACTIVATED) 15; 15; 15; 15 UG/.5ML; UG/.5ML; UG/.5ML; UG/.5ML
INJECTION, SUSPENSION INTRAMUSCULAR
COMMUNITY
Start: 2023-10-05

## 2024-02-16 NOTE — PROGRESS NOTES
Subjective:       Patient ID: Ray Ugarte is a 38 y.o. female.    Chief Complaint: Pre-op Exam    HPI  Pre-op evaluation prior to scheduled dacryocystorhinostomy.    Pt denies chest pain at rest or w/ exertion, dyspnea at rest or w/ exertion, PND, LE edema, claudication, or palpitations. Pt has no history of ischemic heart disease, CHF, CVD, diabetes, recent anticoagulant or antithrombic use, personal or family history of coagulopathy. Pt denies history of stress test, cardiac cath,or coronary revascularization. Pt reports being able to achieve >4 METs activity. She is physically active daily, uses elipitcal 30 mins-1 hour throughout the week, in a dance group, does pilates and yoga.  Meds reviewed, she takes MVI, hydroxyzine prn for sleep and zoloft daily.    All of your core healthy metrics are met.      Social History     Social History Narrative    Works as     Lives with  and daughter       Family History   Problem Relation Age of Onset    No Known Problems Mother     Pancreatic cancer Father 75    Allergies Brother     Breast cancer Neg Hx     Colon cancer Neg Hx     Ovarian cancer Neg Hx        Current Outpatient Medications:     FLUZONE QUAD 5664-4294, PF, 60 mcg (15 mcg x 4)/0.5 mL Syrg, , Disp: , Rfl:     multivit with calcium,iron,min (WOMEN'S DAILY FORMULA ORAL), as directed Orally, Disp: , Rfl:     multivitamin capsule, Take 1 capsule by mouth once daily., Disp: , Rfl:     SPIKEVAX 5578-4062,12Y UP,,PF, 50 mcg/0.5 mL injection, , Disp: , Rfl:     Review of Systems   Constitutional:  Negative for chills and fever.   Respiratory:  Negative for cough and shortness of breath.    Cardiovascular:  Negative for chest pain and palpitations.   Gastrointestinal:  Negative for nausea and vomiting.   Musculoskeletal:  Negative for gait problem.   Neurological:  Negative for weakness.   Psychiatric/Behavioral:  Negative for behavioral problems.        Objective:   /78   Pulse 85    Wt 72 kg (158 lb 11.7 oz)   LMP 01/03/2024 (Approximate)   SpO2 97%   BMI 25.62 kg/m²      Physical Exam  Vitals and nursing note reviewed.   Constitutional:       General: She is not in acute distress.     Appearance: Normal appearance. She is not ill-appearing, toxic-appearing or diaphoretic.   Eyes:      General:         Right eye: No discharge.         Left eye: No discharge.      Conjunctiva/sclera: Conjunctivae normal.   Cardiovascular:      Rate and Rhythm: Normal rate and regular rhythm.   Pulmonary:      Effort: Pulmonary effort is normal. No respiratory distress.      Breath sounds: Normal breath sounds. No wheezing.   Neurological:      Mental Status: She is alert.   Psychiatric:         Behavior: Behavior normal.         Assessment & Plan   1. Pre-op evaluation  RCRI: 0 points, class 1 risk..    Surgical Risks: Vascular/cardiothoracic/emergent surgeries are high risk and carry a >5% risk of MI. Head & Neck/Abdominal/Orthopedic/Prostate surgeries are intermediate risk and carry a 1-5% risk of MI. Endoscopic/Cataract/Plastic/Breast surgeries are low risk and carry <1% risk of MI.     Per ACC/AHA karla-operative guidelines, moderate risk surgery and METS >=4 is low risk for MACE and no further cardiac testing is required.     Per evaluation, patient is low risk for this planned procedure. Patient is medically optimized for surgery at this time.    No follow-ups on file.    Disclaimer:  This note may have been prepared using voice recognition software, it may have not been extensively proofed, as such there could be errors within the text such as sound alike errors.

## 2024-02-19 ENCOUNTER — PATIENT MESSAGE (OUTPATIENT)
Dept: OPHTHALMOLOGY | Facility: CLINIC | Age: 39
End: 2024-02-19
Payer: COMMERCIAL

## 2024-02-19 ENCOUNTER — TELEPHONE (OUTPATIENT)
Dept: OPHTHALMOLOGY | Facility: CLINIC | Age: 39
End: 2024-02-19
Payer: COMMERCIAL

## 2024-02-26 ENCOUNTER — ANESTHESIA EVENT (OUTPATIENT)
Dept: SURGERY | Facility: HOSPITAL | Age: 39
End: 2024-02-26
Payer: COMMERCIAL

## 2024-02-26 NOTE — PRE-PROCEDURE INSTRUCTIONS
Patient reviewed on 2/26/2024.  Okay to proceed at [facility]. The following pre-procedure instructions and arrival time have been reviewed with patient via phone and sent to patient portal for review.  Patient verbalized an understanding.  Pt to be accompanied by Mom day of procedure as responsible .     Dear Ray     Below you will find basic pre-procedure instructions in preparation for your procedure on 2/27/24 with Dr. Kaba      Arrival time 9:30 am     - DO NOT EAT OR DRINK after midnight the night before your procedure  -HOLD all Diabetic meds night before and morning of procedure   -HOLD Fast acting Insulin (insulin taken with meals) morning of procedure  -HOLD all Fluid pills morning of procedure (Lasix, Hydrochlorothiazide/HCTZ)  - HOLD all non-insulin shots until after surgery (Ozempic, Mounjaro, Trulicity, Wegovy and Adlyxin) (7 days prior)  -HOLD all DAILY non-insulin injections (such as: Byetta, Victoza, Saxenda, Adlyxin, Semaglutide) morning of your procedure  -HOLD all vitamins, mineral and herbal supplements (including herbal teas) for a total of 7 days prior to surgery  -HOLD anything containing Vitamin E for a total of 7 days prior to your procedure  -TAKE all Blood Pressure medications, EXCEPT those that contain a fluid pill (such as: Hydrochlorothiazide (HCTZ), Lasix (Furosemide))  -USE inhalers as needed and bring AM of surgery     -Please stop taking NSAIDS 7 days before your procedure (ex. Aspirin, Aleve, Advil, Goody Powder or BC, Ibuprofen, Motrin, Naproxen)  -Please stop taking the following blood thinners: Xarelto, Eliquis, Coumadin, Warfarin, Rivaroxaban and Apixaban which should all be stopped 3 days prior to surgery     Please be sure to review the given medication RESTRICTION Sheet in your surgery folder regarding medications that you MUST STOP BEFORE your surgery date.  Please note that if you take any of the RESTRICTED medications on that list your surgery WILL HAVE TO BE  RESCHEDULED!!!!  - Shower and wash face PM prior and AM of surgery  - No powder, lotions, creams, oils, gels, ointments, makeup,  or jewelry    - Wear comfortable clothing (button up shirt)     (Patient is required to have a responsible ride to transport home)     -[facility], 2nd floor Surgery Center, located   @ [address] [place] [id number]  2nd Floor Registration bypass first floor desk        If you have any questions or concerns please feel free to contact your surgeon's office at [phone]        Please reply to this message as receipt of delivery.        SANTY Conde  [facility]  Pre-Admit - Anesthesia Dept

## 2024-02-26 NOTE — ANESTHESIA PREPROCEDURE EVALUATION
[facility]  Anesthesia Pre-Operative Evaluation         Patient Name: Ray Ugarte  YOB: 1985  MRN: [id number]    SUBJECTIVE:     2024    Procedure(s) (LRB):  DACRYOCYSTORHINOSTOMY (Left)  BIOPSY,LACRIMAL SAC (Left)  INSERTION, CABRERA TUBE (Left)  EXCISION, NASAL TURBINATE (Left)  ETHMOIDECTOMY, TOTAL, EXTRANASAL APPROACH (Left)  DACRYOCYSTORHINOSTOMY, ENDOSCOPIC (Left)  ETHMOIDECTOMY, PARTIAL, ENDOSCOPIC (Left)    Ray Ugarte is a 38 y.o. female here for Procedure(s) (LRB):  DACRYOCYSTORHINOSTOMY (Left)  BIOPSY,LACRIMAL SAC (Left)  INSERTION, CABRERA TUBE (Left)  EXCISION, NASAL TURBINATE (Left)  ETHMOIDECTOMY, TOTAL, EXTRANASAL APPROACH (Left)  DACRYOCYSTORHINOSTOMY, ENDOSCOPIC (Left)  ETHMOIDECTOMY, PARTIAL, ENDOSCOPIC (Left)    Drips:     Patient Active Problem List   Diagnosis    Instability of pelvis or thigh joint    Weakness of both hips    Acetabular labrum tear       Review of patient's allergies indicates:   Allergen Reactions    Shellfish containing products Nausea And Vomiting      Only mussels       No current facility-administered medications on file prior to encounter.     Current Outpatient Medications on File Prior to Encounter   Medication Sig Dispense Refill    hydrOXYzine HCL (ATARAX) 25 MG tablet Take 25 mg by mouth every evening.      multivitamin capsule Take 1 capsule by mouth once daily.         Past Surgical History:   Procedure Laterality Date     SECTION  04/24/2015    x1    LASIK      TONSILLECTOMY         Social History     Socioeconomic History    Marital status:    Tobacco Use    Smoking status: Never     Passive exposure: Never    Smokeless tobacco: Never   Substance and Sexual Activity    Alcohol use: Not Currently     Comment: occasionally    Drug use: No    Sexual activity: Yes     Partners: Male     Birth control/protection: None   Social History Narrative    Works as     Lives with  and daughter      Social Determinants of Health     Financial Resource Strain: Low Risk  (2/6/2024)    Overall Financial Resource Strain (CARDIA)     Difficulty of Paying Living Expenses: Not hard at all   Food Insecurity: No Food Insecurity (2/6/2024)    Hunger Vital Sign     Worried About Running Out of Food in the Last Year: Never true     Ran Out of Food in the Last Year: Never true   Transportation Needs: No Transportation Needs (2/6/2024)    PRAPARE - Transportation     Lack of Transportation (Medical): No     Lack of Transportation (Non-Medical): No   Physical Activity: Sufficiently Active (2/6/2024)    Exercise Vital Sign     Days of Exercise per Week: 7 days     Minutes of Exercise per Session: 30 min   Stress: Stress Concern Present (2/6/2024)    [employer] - Occupational Stress Questionnaire     Feeling of Stress : Very much   Social Connections: Unknown (2/6/2024)    Social Connection and Isolation Panel [NHANES]     Frequency of Communication with Friends and Family: Three times a week     Frequency of Social Gatherings with Friends and Family: Once a week     Active Member of Clubs or Organizations: Yes     Attends Club or Organization Meetings: More than 4 times per year     Marital Status:    Housing Stability: Low Risk  (2/6/2024)    Housing Stability Vital Sign     Unable to Pay for Housing in the Last Year: No     Number of Places Lived in the Last Year: 1     Unstable Housing in the Last Year: No         OBJECTIVE:     Vital Signs Range (Last 24H):  Temp:  [36.6 °C (97.9 °F)] 36.6 °C (97.9 °F)  Pulse:  [77] 77  Resp:  [16] 16  SpO2:  [100 %] 100 %  BP: (115)/(72) 115/72    Significant Labs:  Lab Results   Component Value Date    WBC 7.18 02/08/2024    HGB 12.6 02/08/2024    HCT 38.0 02/08/2024     02/08/2024    CHOL 204 (H) 10/17/2023    TRIG 69 10/17/2023    HDL 63 10/17/2023    ALT 8 (L) 10/17/2023    AST 14 10/17/2023     10/17/2023    K 4.2 10/17/2023    CL  105 10/17/2023    CREATININE 0.9 10/17/2023    BUN 7 10/17/2023    CO2 23 10/17/2023    TSH 2.013 10/17/2023    INR 1.0 2015             Pre-op Assessment    I have reviewed the Patient Summary Reports.     I have reviewed the Nursing Notes. I have reviewed the NPO Status.   I have reviewed the Medications.     Review of Systems  Anesthesia Hx:     and Lasik History of prior surgery of interest to airway management or planning:          Denies Family Hx of Anesthesia complications.    Denies Personal Hx of Anesthesia complications.                    Social:  Non-Smoker, No Alcohol Use       EENT/Dental:  chronic allergic rhinitis           Cardiovascular:  Cardiovascular Normal Exercise tolerance: good    Denies Hypertension.  Denies Valvular problems/Murmurs.              Murmur as a child that resolved into adulthood                          Pulmonary:         Recent cold that developed several days ago.  Pt with congestion and improving rhinorrhea.  No fever, shortness of breath, or wheezing.                Renal/:  Renal/ Normal  Denies Chronic Renal Disease.                Hepatic/GI:  Hepatic/GI Normal     Denies GERD. Denies Liver Disease.            Neurological:  Neurology Normal      Denies Seizures.                                Endocrine:  Endocrine Normal Denies Diabetes. Denies Hypothyroidism.  Denies Hyperthyroidism.         Psych:    depression                Physical Exam  General: Well nourished, Cooperative, Alert and Oriented  No pharyngeal erythema   Airway:  Mallampati: II / I  Mouth Opening: Normal  TM Distance: Normal  Tongue: Normal  Neck ROM: Normal ROM    Dental:  Intact        Anesthesia Plan  Type of Anesthesia, risks & benefits discussed:    Anesthesia Type: Gen ETT, Gen Supraglottic Airway  Intra-op Monitoring Plan: Standard ASA Monitors  Post Op Pain Control Plan: multimodal analgesia and IV/PO Opioids PRN  Induction:  IV  Airway Plan: Video,  Post-Induction  Informed Consent: Informed consent signed with the Patient and all parties understand the risks and agree with anesthesia plan.  All questions answered.   ASA Score: 1  Day of Surgery Review of History & Physical: H&P Update referred to the surgeon/provider.  Anesthesia Plan Notes:     Pt does not have any additional symptoms to suspect worsening pneumonia or upper respiratory tract infection.  I will defer to the surgeon regarding the procedure and if her symptoms will interfere with the procedure or healing.     Ready For Surgery From Anesthesia Perspective.     .

## 2024-02-27 ENCOUNTER — ANESTHESIA (OUTPATIENT)
Dept: SURGERY | Facility: HOSPITAL | Age: 39
End: 2024-02-27
Payer: COMMERCIAL

## 2024-02-27 ENCOUNTER — HOSPITAL ENCOUNTER (OUTPATIENT)
Facility: HOSPITAL | Age: 39
Discharge: HOME | End: 2024-02-27
Attending: OPHTHALMOLOGY | Admitting: OPHTHALMOLOGY
Payer: COMMERCIAL

## 2024-02-27 ENCOUNTER — NURSE TRIAGE (OUTPATIENT)
Dept: ADMINISTRATIVE | Facility: CLINIC | Age: 39
End: 2024-02-27
Payer: COMMERCIAL

## 2024-02-27 VITALS
BODY MASS INDEX: 24.11 KG/M2 | OXYGEN SATURATION: 95 % | HEART RATE: 59 BPM | WEIGHT: 150 LBS | SYSTOLIC BLOOD PRESSURE: 137 MMHG | RESPIRATION RATE: 18 BRPM | HEIGHT: 66 IN | DIASTOLIC BLOOD PRESSURE: 84 MMHG | TEMPERATURE: 99 F

## 2024-02-27 DIAGNOSIS — H04.552 NLDO, ACQUIRED (NASOLACRIMAL DUCT OBSTRUCTION), LEFT: Primary | ICD-10-CM

## 2024-02-27 LAB
B-HCG UR QL: NEGATIVE
CTP QC/QA: YES

## 2024-02-27 PROCEDURE — 37000008 HC ANESTHESIA 1ST 15 MINUTES: Performed by: OPHTHALMOLOGY

## 2024-02-27 PROCEDURE — 88342 IMHCHEM/IMCYTCHM 1ST ANTB: CPT | Performed by: STUDENT IN AN ORGANIZED HEALTH CARE EDUCATION/TRAINING PROGRAM

## 2024-02-27 PROCEDURE — 25000003 PHARM REV CODE 250: Performed by: STUDENT IN AN ORGANIZED HEALTH CARE EDUCATION/TRAINING PROGRAM

## 2024-02-27 PROCEDURE — 71000033 HC RECOVERY, INTIAL HOUR: Performed by: OPHTHALMOLOGY

## 2024-02-27 PROCEDURE — 94761 N-INVAS EAR/PLS OXIMETRY MLT: CPT

## 2024-02-27 PROCEDURE — 27201423 OPTIME MED/SURG SUP & DEVICES STERILE SUPPLY: Performed by: OPHTHALMOLOGY

## 2024-02-27 PROCEDURE — 81025 URINE PREGNANCY TEST: CPT | Performed by: OPHTHALMOLOGY

## 2024-02-27 PROCEDURE — 88305 TISSUE EXAM BY PATHOLOGIST: CPT | Performed by: STUDENT IN AN ORGANIZED HEALTH CARE EDUCATION/TRAINING PROGRAM

## 2024-02-27 PROCEDURE — 99900035 HC TECH TIME PER 15 MIN (STAT)

## 2024-02-27 PROCEDURE — 31239 NSL/SINUS ENDOSCOPY SURG DCR: CPT | Mod: LT,,, | Performed by: OPHTHALMOLOGY

## 2024-02-27 PROCEDURE — 36000709 HC OR TIME LEV III EA ADD 15 MIN: Performed by: OPHTHALMOLOGY

## 2024-02-27 PROCEDURE — 25000003 PHARM REV CODE 250: Performed by: NURSE ANESTHETIST, CERTIFIED REGISTERED

## 2024-02-27 PROCEDURE — 36000708 HC OR TIME LEV III 1ST 15 MIN: Performed by: OPHTHALMOLOGY

## 2024-02-27 PROCEDURE — 88305 TISSUE EXAM BY PATHOLOGIST: CPT | Mod: 26,,, | Performed by: STUDENT IN AN ORGANIZED HEALTH CARE EDUCATION/TRAINING PROGRAM

## 2024-02-27 PROCEDURE — 25000003 PHARM REV CODE 250: Performed by: OPHTHALMOLOGY

## 2024-02-27 PROCEDURE — 71000015 HC POSTOP RECOV 1ST HR: Performed by: OPHTHALMOLOGY

## 2024-02-27 PROCEDURE — 63600175 PHARM REV CODE 636 W HCPCS: Performed by: NURSE ANESTHETIST, CERTIFIED REGISTERED

## 2024-02-27 PROCEDURE — 68815 PROBE NASOLACRIMAL DUCT: CPT | Mod: 51,LT,, | Performed by: OPHTHALMOLOGY

## 2024-02-27 PROCEDURE — 88342 IMHCHEM/IMCYTCHM 1ST ANTB: CPT | Mod: 26,,, | Performed by: STUDENT IN AN ORGANIZED HEALTH CARE EDUCATION/TRAINING PROGRAM

## 2024-02-27 PROCEDURE — 63600175 PHARM REV CODE 636 W HCPCS: Performed by: STUDENT IN AN ORGANIZED HEALTH CARE EDUCATION/TRAINING PROGRAM

## 2024-02-27 PROCEDURE — 88341 IMHCHEM/IMCYTCHM EA ADD ANTB: CPT | Mod: 26,,, | Performed by: STUDENT IN AN ORGANIZED HEALTH CARE EDUCATION/TRAINING PROGRAM

## 2024-02-27 PROCEDURE — 63600175 PHARM REV CODE 636 W HCPCS: Performed by: OPHTHALMOLOGY

## 2024-02-27 PROCEDURE — 88341 IMHCHEM/IMCYTCHM EA ADD ANTB: CPT | Mod: 59 | Performed by: STUDENT IN AN ORGANIZED HEALTH CARE EDUCATION/TRAINING PROGRAM

## 2024-02-27 PROCEDURE — D9220A PRA ANESTHESIA: Mod: ,,, | Performed by: NURSE ANESTHETIST, CERTIFIED REGISTERED

## 2024-02-27 PROCEDURE — 37000009 HC ANESTHESIA EA ADD 15 MINS: Performed by: OPHTHALMOLOGY

## 2024-02-27 DEVICE — LACRIMAL INTUBATION SET CRAWFORD
Type: IMPLANTABLE DEVICE | Site: LACRIMAL DUCT | Status: FUNCTIONAL
Brand: CRAWFORD

## 2024-02-27 RX ORDER — ACETAMINOPHEN 10 MG/ML
INJECTION, SOLUTION INTRAVENOUS
Status: DISCONTINUED | OUTPATIENT
Start: 2024-02-27 | End: 2024-02-27

## 2024-02-27 RX ORDER — NEOMYCIN SULFATE, POLYMYXIN B SULFATE AND DEXAMETHASONE 3.5; 10000; 1 MG/ML; [USP'U]/ML; MG/ML
1 SUSPENSION/ DROPS OPHTHALMIC 4 TIMES DAILY
Qty: 5 ML | Refills: 0 | Status: SHIPPED | OUTPATIENT
Start: 2024-02-27 | End: 2024-04-17 | Stop reason: SDUPTHER

## 2024-02-27 RX ORDER — IPRATROPIUM BROMIDE AND ALBUTEROL SULFATE 2.5; .5 MG/3ML; MG/3ML
3 SOLUTION RESPIRATORY (INHALATION) EVERY 4 HOURS PRN
Status: DISCONTINUED | OUTPATIENT
Start: 2024-02-27 | End: 2024-02-27 | Stop reason: HOSPADM

## 2024-02-27 RX ORDER — ROCURONIUM BROMIDE 10 MG/ML
INJECTION, SOLUTION INTRAVENOUS
Status: DISCONTINUED | OUTPATIENT
Start: 2024-02-27 | End: 2024-02-27

## 2024-02-27 RX ORDER — LIDOCAINE HYDROCHLORIDE 20 MG/ML
JELLY TOPICAL
Status: DISCONTINUED | OUTPATIENT
Start: 2024-02-27 | End: 2024-02-27 | Stop reason: HOSPADM

## 2024-02-27 RX ORDER — LIDOCAINE HYDROCHLORIDE 20 MG/ML
INJECTION INTRAVENOUS
Status: DISCONTINUED | OUTPATIENT
Start: 2024-02-27 | End: 2024-02-27

## 2024-02-27 RX ORDER — OXYCODONE HYDROCHLORIDE 5 MG/1
10 TABLET ORAL EVERY 4 HOURS PRN
Status: DISCONTINUED | OUTPATIENT
Start: 2024-02-27 | End: 2024-02-27 | Stop reason: HOSPADM

## 2024-02-27 RX ORDER — MIDAZOLAM HYDROCHLORIDE 1 MG/ML
INJECTION, SOLUTION INTRAMUSCULAR; INTRAVENOUS
Status: DISCONTINUED | OUTPATIENT
Start: 2024-02-27 | End: 2024-02-27

## 2024-02-27 RX ORDER — OXYMETAZOLINE HCL 0.05 %
SPRAY, NON-AEROSOL (ML) NASAL
Status: DISCONTINUED | OUTPATIENT
Start: 2024-02-27 | End: 2024-02-27 | Stop reason: HOSPADM

## 2024-02-27 RX ORDER — PROCHLORPERAZINE EDISYLATE 5 MG/ML
INJECTION INTRAMUSCULAR; INTRAVENOUS
Status: DISCONTINUED | OUTPATIENT
Start: 2024-02-27 | End: 2024-02-27

## 2024-02-27 RX ORDER — HYDROCODONE BITARTRATE AND ACETAMINOPHEN 5; 325 MG/1; MG/1
1 TABLET ORAL EVERY 6 HOURS PRN
Qty: 16 TABLET | Refills: 0 | Status: SHIPPED | OUTPATIENT
Start: 2024-02-27

## 2024-02-27 RX ORDER — OXYCODONE HYDROCHLORIDE 5 MG/1
5 TABLET ORAL
Status: DISCONTINUED | OUTPATIENT
Start: 2024-02-27 | End: 2024-02-27 | Stop reason: HOSPADM

## 2024-02-27 RX ORDER — ACETAMINOPHEN 500 MG
1000 TABLET ORAL ONCE
Status: COMPLETED | OUTPATIENT
Start: 2024-02-27 | End: 2024-02-27

## 2024-02-27 RX ORDER — DEXMEDETOMIDINE HYDROCHLORIDE 100 UG/ML
INJECTION, SOLUTION INTRAVENOUS
Status: DISCONTINUED | OUTPATIENT
Start: 2024-02-27 | End: 2024-02-27

## 2024-02-27 RX ORDER — TOBRAMYCIN AND DEXAMETHASONE 3; 1 MG/ML; MG/ML
SUSPENSION/ DROPS OPHTHALMIC
Status: DISCONTINUED | OUTPATIENT
Start: 2024-02-27 | End: 2024-02-27 | Stop reason: HOSPADM

## 2024-02-27 RX ORDER — TETRACAINE HYDROCHLORIDE 5 MG/ML
1 SOLUTION OPHTHALMIC
Status: DISCONTINUED | OUTPATIENT
Start: 2024-02-27 | End: 2024-02-27 | Stop reason: HOSPADM

## 2024-02-27 RX ORDER — LIDOCAINE HYDROCHLORIDE 10 MG/ML
1 INJECTION, SOLUTION EPIDURAL; INFILTRATION; INTRACAUDAL; PERINEURAL ONCE AS NEEDED
Status: DISCONTINUED | OUTPATIENT
Start: 2024-02-27 | End: 2024-02-27 | Stop reason: HOSPADM

## 2024-02-27 RX ORDER — SODIUM CHLORIDE 9 MG/ML
INJECTION, SOLUTION INTRAVENOUS CONTINUOUS
Status: DISCONTINUED | OUTPATIENT
Start: 2024-02-27 | End: 2024-02-27 | Stop reason: HOSPADM

## 2024-02-27 RX ORDER — SODIUM CHLORIDE 0.9 % (FLUSH) 0.9 %
10 SYRINGE (ML) INJECTION
Status: DISCONTINUED | OUTPATIENT
Start: 2024-02-27 | End: 2024-02-27 | Stop reason: HOSPADM

## 2024-02-27 RX ORDER — PHENYLEPHRINE HYDROCHLORIDE 10 MG/ML
INJECTION INTRAVENOUS
Status: DISCONTINUED | OUTPATIENT
Start: 2024-02-27 | End: 2024-02-27

## 2024-02-27 RX ORDER — SCOLOPAMINE TRANSDERMAL SYSTEM 1 MG/1
1 PATCH, EXTENDED RELEASE TRANSDERMAL
Status: DISCONTINUED | OUTPATIENT
Start: 2024-02-27 | End: 2024-02-27 | Stop reason: HOSPADM

## 2024-02-27 RX ORDER — CEFAZOLIN SODIUM 1 G/3ML
INJECTION, POWDER, FOR SOLUTION INTRAMUSCULAR; INTRAVENOUS
Status: DISCONTINUED | OUTPATIENT
Start: 2024-02-27 | End: 2024-02-27

## 2024-02-27 RX ORDER — TRIAMCINOLONE ACETONIDE 40 MG/ML
INJECTION, SUSPENSION INTRA-ARTICULAR; INTRAMUSCULAR
Status: DISCONTINUED | OUTPATIENT
Start: 2024-02-27 | End: 2024-02-27 | Stop reason: HOSPADM

## 2024-02-27 RX ORDER — DEXAMETHASONE SODIUM PHOSPHATE 4 MG/ML
INJECTION, SOLUTION INTRA-ARTICULAR; INTRALESIONAL; INTRAMUSCULAR; INTRAVENOUS; SOFT TISSUE
Status: DISCONTINUED | OUTPATIENT
Start: 2024-02-27 | End: 2024-02-27

## 2024-02-27 RX ORDER — HYDROMORPHONE HYDROCHLORIDE 1 MG/ML
0.2 INJECTION, SOLUTION INTRAMUSCULAR; INTRAVENOUS; SUBCUTANEOUS EVERY 5 MIN PRN
Status: DISCONTINUED | OUTPATIENT
Start: 2024-02-27 | End: 2024-02-27 | Stop reason: HOSPADM

## 2024-02-27 RX ORDER — ONDANSETRON HYDROCHLORIDE 2 MG/ML
INJECTION, SOLUTION INTRAVENOUS
Status: DISCONTINUED | OUTPATIENT
Start: 2024-02-27 | End: 2024-02-27

## 2024-02-27 RX ORDER — PROPOFOL 10 MG/ML
VIAL (ML) INTRAVENOUS
Status: DISCONTINUED | OUTPATIENT
Start: 2024-02-27 | End: 2024-02-27

## 2024-02-27 RX ORDER — FENTANYL CITRATE 50 UG/ML
INJECTION, SOLUTION INTRAMUSCULAR; INTRAVENOUS
Status: DISCONTINUED | OUTPATIENT
Start: 2024-02-27 | End: 2024-02-27

## 2024-02-27 RX ORDER — PROCHLORPERAZINE EDISYLATE 5 MG/ML
5 INJECTION INTRAMUSCULAR; INTRAVENOUS EVERY 30 MIN PRN
Status: DISCONTINUED | OUTPATIENT
Start: 2024-02-27 | End: 2024-02-27 | Stop reason: HOSPADM

## 2024-02-27 RX ORDER — LIDOCAINE HYDROCHLORIDE AND EPINEPHRINE 10; 10 MG/ML; UG/ML
INJECTION, SOLUTION INFILTRATION; PERINEURAL
Status: DISCONTINUED | OUTPATIENT
Start: 2024-02-27 | End: 2024-02-27 | Stop reason: HOSPADM

## 2024-02-27 RX ORDER — OXYMETAZOLINE HCL 0.05 %
2 SPRAY, NON-AEROSOL (ML) NASAL ONCE
Status: COMPLETED | OUTPATIENT
Start: 2024-02-27 | End: 2024-02-27

## 2024-02-27 RX ORDER — HYDROXYZINE HYDROCHLORIDE 25 MG/1
25 TABLET, FILM COATED ORAL NIGHTLY
COMMUNITY
End: 2024-03-08 | Stop reason: SDUPTHER

## 2024-02-27 RX ADMIN — SCOPALAMINE 1 PATCH: 1 PATCH, EXTENDED RELEASE TRANSDERMAL at 10:02

## 2024-02-27 RX ADMIN — Medication 2 SPRAY: at 10:02

## 2024-02-27 RX ADMIN — HYDROMORPHONE HYDROCHLORIDE 0.2 MG: 1 INJECTION, SOLUTION INTRAMUSCULAR; INTRAVENOUS; SUBCUTANEOUS at 05:02

## 2024-02-27 RX ADMIN — ACETAMINOPHEN 1000 MG: 10 INJECTION INTRAVENOUS at 02:02

## 2024-02-27 RX ADMIN — CEFAZOLIN 2 G: 330 INJECTION, POWDER, FOR SOLUTION INTRAMUSCULAR; INTRAVENOUS at 01:02

## 2024-02-27 RX ADMIN — ROCURONIUM BROMIDE 20 MG: 10 INJECTION INTRAVENOUS at 02:02

## 2024-02-27 RX ADMIN — ROCURONIUM BROMIDE 50 MG: 10 INJECTION INTRAVENOUS at 01:02

## 2024-02-27 RX ADMIN — GLYCOPYRROLATE 0.1 MG: 0.2 INJECTION, SOLUTION INTRAMUSCULAR; INTRAVENOUS at 01:02

## 2024-02-27 RX ADMIN — FENTANYL CITRATE 100 MCG: 50 INJECTION, SOLUTION INTRAMUSCULAR; INTRAVENOUS at 01:02

## 2024-02-27 RX ADMIN — SODIUM CHLORIDE: 0.9 INJECTION, SOLUTION INTRAVENOUS at 01:02

## 2024-02-27 RX ADMIN — FENTANYL CITRATE 100 MCG: 50 INJECTION, SOLUTION INTRAMUSCULAR; INTRAVENOUS at 03:02

## 2024-02-27 RX ADMIN — ACETAMINOPHEN 1000 MG: 500 TABLET ORAL at 10:02

## 2024-02-27 RX ADMIN — ROCURONIUM BROMIDE 10 MG: 10 INJECTION INTRAVENOUS at 04:02

## 2024-02-27 RX ADMIN — PROPOFOL 200 MG: 10 INJECTION, EMULSION INTRAVENOUS at 01:02

## 2024-02-27 RX ADMIN — PROCHLORPERAZINE EDISYLATE 2.5 MG: 5 INJECTION INTRAMUSCULAR; INTRAVENOUS at 01:02

## 2024-02-27 RX ADMIN — DEXAMETHASONE SODIUM PHOSPHATE 8 MG: 4 INJECTION INTRA-ARTICULAR; INTRALESIONAL; INTRAMUSCULAR; INTRAVENOUS; SOFT TISSUE at 01:02

## 2024-02-27 RX ADMIN — SODIUM CHLORIDE: 9 INJECTION, SOLUTION INTRAVENOUS at 10:02

## 2024-02-27 RX ADMIN — ONDANSETRON 4 MG: 2 INJECTION INTRAMUSCULAR; INTRAVENOUS at 04:02

## 2024-02-27 RX ADMIN — DEXMEDETOMIDINE HYDROCHLORIDE 8 MCG: 100 INJECTION, SOLUTION INTRAVENOUS at 05:02

## 2024-02-27 RX ADMIN — OXYCODONE HYDROCHLORIDE 10 MG: 5 TABLET ORAL at 06:02

## 2024-02-27 RX ADMIN — SUGAMMADEX 150 MG: 100 INJECTION, SOLUTION INTRAVENOUS at 05:02

## 2024-02-27 RX ADMIN — MIDAZOLAM HYDROCHLORIDE 2 MG: 1 INJECTION, SOLUTION INTRAMUSCULAR; INTRAVENOUS at 01:02

## 2024-02-27 RX ADMIN — LIDOCAINE HYDROCHLORIDE 80 MG: 20 INJECTION INTRAVENOUS at 01:02

## 2024-02-27 RX ADMIN — PHENYLEPHRINE HYDROCHLORIDE 100 MCG: 10 INJECTION INTRAVENOUS at 02:02

## 2024-02-27 RX ADMIN — ONDANSETRON 4 MG: 2 INJECTION INTRAMUSCULAR; INTRAVENOUS at 01:02

## 2024-02-27 RX ADMIN — ROCURONIUM BROMIDE 10 MG: 10 INJECTION INTRAVENOUS at 03:02

## 2024-02-27 NOTE — TRANSFER OF CARE
"Anesthesia Transfer of Care Note    Patient: Ray Ugarte    Procedure(s) Performed: Procedure(s) (LRB):  BIOPSY,LACRIMAL SAC (Left)  INSERTION, CABRERA TUBE (Left)  DACRYOCYSTORHINOSTOMY, ENDOSCOPIC (Left)    Patient location: PACU    Anesthesia Type: general    Transport from OR: Transported from OR on 6-10 L/min O2 by face mask with adequate spontaneous ventilation    Post pain: adequate analgesia    Post assessment: no apparent anesthetic complications    Post vital signs: stable    Level of consciousness: sedated    Nausea/Vomiting: no nausea/vomiting    Complications: none    Transfer of care protocol was followed      Last vitals: Visit Vitals  /72 (BP Location: Left arm, Patient Position: Lying)   Pulse 76   Temp 36.6 °C (97.9 °F) (Temporal)   Resp 16   Ht 5' 6" (1.676 m)   Wt 68 kg (150 lb)   LMP 01/03/2024 (Approximate)   SpO2 100%   Breastfeeding No   BMI 24.21 kg/m²     "

## 2024-02-27 NOTE — OP NOTE
OPHTHALMOLOGY OPERATIVE NOTE:     DATE OF PROCEDURE: 2/27/2024      ATTENDING: Delores Kaba M.D.      RESIDENT: Cheryl Bradley MD     PREOPERATIVE DIAGNOSIS: Left Nasolacrimal duct obstruction     POSTOPERATIVE DIAGNOSIS:  Same as preoperative      OPERATION PERFORMED:   1. Endoscopic Dacryocystorhinostomy with placement of Mirza stents, lacrimal sac biopsy     ANESTHESIA: General and local (1% lidocaine with 1:100,000 epinephrine)     BLOOD LOSS: < 20 cc     COMPLICATIONS: None     SPECIMEN:  1) left lacrimal sac      INDICATIONS FOR SURGERY:   Patient is a 38 y.o.-year-old female with acquired left nasolacrimal duct obstruction. The patient was recommended to undergo dacryocystorhinostomy and placement of bicanalicular mirza tubes for correction.  Extensive risks, benefits, and alternatives were discussed with the patient including, but not limited to pain, bleeding, infection, scarring, loss of vision, injury or loss of the eye, asymmetry, undercorrection, overcorrection, need for revision in future  Alternatives such as waiting were discussed.  After the opportunity to ask questions, the patient elected to proceed. A consent document was signed, witnessed, and placed in the patient's chart.     Procedure in detail:   The patient was identified in the pre-op holding area, the correct eye was marked if unilateral.  Afrin was sprayed two times at least five minutes apart in both nares. The patient was taken to the operating room and placed supine on the operating room table. A time out was performed including patient's name, date of birth, allergies, surgical site, and anticipated procedure. After adequate anesthesia was achieved, a marking pen was used to place a J-shaped curvilinear jagjit in the tear trough for the potential site of an external approach.  A solution of 1% lidocaine with epinephrine was injected into this area and into the nasal mucosa of the middle turbinate for hemostasis. The left middle  "meatus was packed with Afrin- soaked nasal patties. The patient was prepped and draped in usual sterile fashion for ophthalmic plastic surgery.      Attention was turned to the left eye. The Afrin- soaked nasal patties were removed from the left nare, and a zero degree nasal endoscope was introduced. The superior and inferior canaliculi were dilated using a punctal dilator. A retinal light pipe was then inserted through the superior canaliculus and the light was seen to illuminate the nasal mucosa through the lacrimal sac near the level of the middle turbinate. The light pipe was taped in place to help guide the endoscopic dissection towards the lacrimal sac.  A crescent blade was used to incise the nasal mucosa, creating anterior and posterior flaps. A kerrison rongeur, Blakesley forceps, Sonopet, and a viktor elevator were used to remove bone from the lacrimal crest and create mucosal flaps order to expose the entire lacrimal sac. The retinal light pipe was then removed from the superior punctum, and viscous lidocaine gel was injected into the superior canaliculus, which filled the lacrimal sac. A  #1 Amador probe was then passed through the system and could be visualized pressing the inner wall of the lacrimal sac. The lacrimal sac was incised with a crescent blade and enlarged superiorly and inferiorly in a "H" pattern from the nasal side forming posterior and anterior flaps that aligned well with the previously elevated nasal mucosal flaps. Blakesley forceps were used to remove excess anterior and posterior lacrimal sac flaps and sent for pathology. The amador probe was confirmed to freely pass through the new ostium. A bicanalicular Mirza tube was advanced through the upper and lower canaliculi into the lacrimal sac, then subsequently into the middle nasal meatus and pulled out of the nostril using a mirza hook. A Kenalog soaked gelfoam pledget was threaded over both stents and slid into place at the new " ostium. The ends of the stents were tied and the patient was confirmed to have adequate hemostasis.     Tobradex drops were placed. The patient tolerated the procedure well. The patient was extubated without complications and transferred to recovery in stable condition. Post-operative instructions were given to the patient.

## 2024-02-27 NOTE — DISCHARGE INSTRUCTIONS
No bandage necessary    Apply drops to left eye 4 times daily    Start saline rinse twice daily to left nare

## 2024-02-27 NOTE — ANESTHESIA PROCEDURE NOTES
Intubation    Date/Time: 2/27/2024 1:41 PM    Performed by: Lyubov Black CRNA  Authorized by: Viraj Russell MD    Intubation:     Induction:  Intravenous    Intubated:  Postinduction    Mask Ventilation:  Easy mask    Attempts:  1    Attempted By:  CRNA    Method of Intubation:  Video laryngoscopy    Blade:  Johnson 3    Laryngeal View Grade: Grade I - full view of cords      Difficult Airway Encountered?: No      Complications:  None    Airway Device:  Oral endotracheal tube    Airway Device Size:  7.0    Style/Cuff Inflation:  Cuffed (inflated to minimal occlusive pressure)    Tube secured:  22    Secured at:  The lips    Placement Verified By:  Capnometry    Complicating Factors:  None    Findings Post-Intubation:  BS equal bilateral and atraumatic/condition of teeth unchanged

## 2024-02-27 NOTE — H&P
Pre-Operative History & Physical  Ophthalmology      SUBJECTIVE:     History of Present Illness:  Patient is a 38 y.o. female presents with Nldo, acquired (nasolacrimal duct obstruction), left [H04.552].    MEDICATIONS:   PTA Medications   Medication Sig    hydrOXYzine HCL (ATARAX) 25 MG tablet Take 25 mg by mouth every evening.    multivit with calcium,iron,min (WOMEN'S DAILY FORMULA ORAL) as directed Orally    multivitamin capsule Take 1 capsule by mouth once daily.    FLUZONE QUAD [id number], PF, 60 mcg (15 mcg x 4)/0.5 mL Syrg     SPIKEVAX [id number],12Y UP,,PF, 50 mcg/0.5 mL injection        ALLERGIES:   Review of patient's allergies indicates:   Allergen Reactions    Shellfish containing products Nausea And Vomiting      Only mussels       PAST MEDICAL HISTORY:   Past Medical History:   Diagnosis Date    Abnormal Pap smear of cervix ?    No procedures necessary. Repeat pap normal    Allergy     Eczema      PAST SURGICAL HISTORY:   Past Surgical History:   Procedure Laterality Date     SECTION  04/24/2015    x1    LASIK      TONSILLECTOMY       PAST FAMILY HISTORY:   Family History   Problem Relation Age of Onset    No Known Problems Mother     Pancreatic cancer Father 75    Allergies Brother     Breast cancer Neg Hx     Colon cancer Neg Hx     Ovarian cancer Neg Hx      SOCIAL HISTORY:   Social History     Tobacco Use    Smoking status: Never     Passive exposure: Never    Smokeless tobacco: Never   Substance Use Topics    Alcohol use: Not Currently     Comment: occasionally    Drug use: No        MENTAL STATUS: Alert    REVIEW OF SYSTEMS: Negative    OBJECTIVE:     Vital Signs (Most Recent)  Temp: 97.9 °F (36.6 °C) (24 1009)  Pulse: 77 (24 1009)  Resp: 16 (24 1009)  BP: 115/72 (24 1009)  SpO2: 100 % (24 1009)    Physical Exam:  General: NAD  HEENT: NLDO OS  Lungs: Adequate respirations, symmetrical movements, non-labored  Heart: Intact distal pulses  Abdomen: Soft,  nondistended, nontender    ASSESSMENT/PLAN:     Patient is a 38 y.o. female with Nldo, acquired (nasolacrimal duct obstruction), left [H04.552].    - Plan for surgical correction with left endoscopic dacryocystorhinostomy with placement of silicone stents, possible middle turbinectomy, possible ethmoidectomy and possible lacrimal sac biopsy .   - Allergies reviewed:   Review of patient's allergies indicates:   Allergen Reactions    Shellfish containing products Nausea And Vomiting      Only mussels     - Has not taken blood thinners (includes ASA, NSAIDS, BC Powder, Goody's Powder, Excedrine, Eliquis, Xarelto, Pradaxa, etc.) for over 5 days.   - Risks/benefits/alternatives of the procedure including, but not limited to scarring, bleeding, infection, loss or decreased vision, and/or need for possible repeat surgery discussed with the patient and family.  - Informed consent obtained prior to surgery and the patient/family voiced good understanding.    Cheryl Bradley MD  [employer] [facility], PGY-3

## 2024-02-27 NOTE — DISCHARGE SUMMARY
Discharge Summary  Ophthalmology Service    Admit Date: 2/27/2024     Discharge Date: 2/27/2024     Attending Physician: Delores Kaba MD     Discharge Physician: Cheryl Bradley MD    Discharged Condition: Good    Reason for Admission: Nldo, acquired (nasolacrimal duct obstruction), left [H04.552]     Treatments/Procedures: Procedure(s) (LRB):  BIOPSY,LACRIMAL SAC (Left)  INSERTION, CABRERA TUBE (Left)  DACRYOCYSTORHINOSTOMY, ENDOSCOPIC (Left)  ETHMOIDECTOMY, PARTIAL, ENDOSCOPIC (Left) (see dictated report for details).    Hospital Course: Stable    Consults: None    Significant Diagnostic Studies: None    Disposition: Home    Patient Instructions:   - Resume same diet as prior to surgery  - Limit activity, no straining, stooping, or lifting things over 10 pounds for the next 14 days  - Call the eye clinic for severe uncontrolled pain, redness or other concerns  - Use any shield or eye drops as directed by your surgeon  - Pt can take Norco 1 tab by mouth every six hours as needed for pain.   - Place Maxitrol drops inside the left eye four times a day   - Use ice packs for swelling for the next three days.   - Elevate the head of your bed while sleeping for the next week.   - Return to clinic for your postoperative visit with Dr. Kaba in 1 week     Patient Instructions:   Current Discharge Medication List        START taking these medications    Details   HYDROcodone-acetaminophen (NORCO) 5-325 mg per tablet Take 1 tablet by mouth every 6 (six) hours as needed for Pain.  Qty: 16 tablet, Refills: 0    Comments: Quantity prescribed more than 7 day supply? Yes, quantity medically necessary      neomycin-polymyxin-dexamethasone (MAXITROL) 3.5mg/mL-10,000 unit/mL-0.1 % DrpS Place 1 drop into the left eye 4 (four) times daily.  Qty: 5 mL, Refills: 0           CONTINUE these medications which have NOT CHANGED    Details   hydrOXYzine HCL (ATARAX) 25 MG tablet Take 25 mg by mouth every evening.      multivit with  calcium,iron,min (WOMEN'S DAILY FORMULA ORAL) as directed Orally      multivitamin capsule Take 1 capsule by mouth once daily.      FLUZONE QUAD 1703-9606, PF, 60 mcg (15 mcg x 4)/0.5 mL Syrg       SPIKEVAX 9827-1046,12Y UP,,PF, 50 mcg/0.5 mL injection              No discharge procedures on file.

## 2024-02-27 NOTE — PLAN OF CARE
Patient AAOx3. Consents need to be signed, H&P needed, all concerns addressed. Patient has no complaints at this time.

## 2024-02-28 NOTE — TELEPHONE ENCOUNTER
Stinging in left y would like to use OTC eye drop of artifical polyethylene glycol.  Message sent via secure chat:  Patient states she is feeling some stinging like feeling in her eyes after the rx drops. She wants to know if she can put in an OTC lubricant?  DR.John Johan MD on all has responded with the following:  Absolutely. Just wait 5 minutes (preferably 10) after putting in the medicated drop  JS   Called to patient . Advice given. Patient voiced understanding.   Patient has questions on full facial washing. I have advised focused cleansing tonight and call to office in AM.     Reason for Disposition   Prescription request for new medicine (not a refill)    Additional Information   Negative: Sounds like a life-threatening emergency to the triager   Negative: Chest pain   Negative: Difficulty breathing   Negative: Acting confused (e.g., disoriented, slurred speech) or excessively sleepy   Negative: [1] Pain or burning with passing urine (urination) AND [2] female   Negative: Constipation   Negative: New or worsening leg (calf, thigh) pain   Negative: New or worsening leg swelling   Negative: Pain, redness, swelling, or pus at IV Site   Medication question   Negative: [1] Intentional drug overdose AND [2] suicidal thoughts or ideas    Protocols used: Post-Op Symptoms and Kvzvvugnq-Y-TS, Medication Question Call-A-AH

## 2024-02-28 NOTE — ANESTHESIA POSTPROCEDURE EVALUATION
Anesthesia Post Evaluation    Patient: Ray Ugarte    Procedure(s) Performed: Procedure(s) (LRB):  BIOPSY,LACRIMAL SAC (Left)  INSERTION, CABRERA TUBE (Left)  DACRYOCYSTORHINOSTOMY, ENDOSCOPIC (Left)    Final Anesthesia Type: general      Patient location during evaluation: PACU  Patient participation: Yes- Able to Participate  Level of consciousness: awake  Post-procedure vital signs: reviewed and stable  Pain management: adequate  Airway patency: patent    PONV status at discharge: No PONV  Anesthetic complications: no      Cardiovascular status: blood pressure returned to baseline  Respiratory status: unassisted  Hydration status: euvolemic  Follow-up not needed.              Vitals Value Taken Time   /84 02/27/24 1817   Temp 37.2 °C (99 °F) 02/27/24 1717   Pulse 62 02/27/24 1819   Resp 25 02/27/24 1819   SpO2 96 % 02/27/24 1818   Vitals shown include unvalidated device data.      Event Time   Out of Recovery 18:00:00         Pain/Mery Score: Pain Rating Prior to Med Admin: 6 (2/27/2024  6:13 PM)  Mery Score: 10 (2/27/2024  5:45 PM)

## 2024-03-06 ENCOUNTER — TELEPHONE (OUTPATIENT)
Dept: OPHTHALMOLOGY | Facility: CLINIC | Age: 39
End: 2024-03-06
Payer: COMMERCIAL

## 2024-03-06 LAB
FINAL PATHOLOGIC DIAGNOSIS: NORMAL
GROSS: NORMAL
Lab: NORMAL
MICROSCOPIC EXAM: NORMAL

## 2024-03-07 ENCOUNTER — PATIENT MESSAGE (OUTPATIENT)
Dept: FAMILY MEDICINE | Facility: CLINIC | Age: 39
End: 2024-03-07
Payer: COMMERCIAL

## 2024-03-07 ENCOUNTER — OFFICE VISIT (OUTPATIENT)
Dept: OPHTHALMOLOGY | Facility: CLINIC | Age: 39
End: 2024-03-07
Payer: COMMERCIAL

## 2024-03-07 DIAGNOSIS — H04.552 NLDO, ACQUIRED (NASOLACRIMAL DUCT OBSTRUCTION), LEFT: Primary | ICD-10-CM

## 2024-03-07 PROCEDURE — 1159F MED LIST DOCD IN RCRD: CPT | Mod: CPTII,S$GLB,, | Performed by: OPHTHALMOLOGY

## 2024-03-07 PROCEDURE — 1160F RVW MEDS BY RX/DR IN RCRD: CPT | Mod: CPTII,S$GLB,, | Performed by: OPHTHALMOLOGY

## 2024-03-07 PROCEDURE — 99999 PR PBB SHADOW E&M-EST. PATIENT-LVL III: CPT | Mod: PBBFAC,,, | Performed by: OPHTHALMOLOGY

## 2024-03-07 PROCEDURE — 99024 POSTOP FOLLOW-UP VISIT: CPT | Mod: S$GLB,,, | Performed by: OPHTHALMOLOGY

## 2024-03-07 NOTE — PROGRESS NOTES
ANGELIKA    Ray Ugarte is a/an 38 y.o. female here for 1 week post op.  Date of procedure: 2/27/2024  Procedure:  Endoscopic Dacryocystorhinostomy with placement of Mirza   stents, lacrimal sac biopsy  Oral antibiotics: NONE   Eye meds:   MAXITROL GTTS QID OS   TOBRADEX GTTS     Patient doing well following procedure. No pain or discomfort.   Tearing has improved significantly.     Last edited by Kwesi Ojeda on 3/7/2024  9:10 AM.            Assessment /Plan     For exam results, see Encounter Report.    Nldo, acquired (nasolacrimal duct obstruction), left  -     External Photography - OU - Both Eyes        Nasal endoscopy:    OS: Normal inferior turbinate and middle turbinate with mirza stent emanating from common canaliculus with adjacent fluorescein dye and adjacent granulation tissue    Procedure note:   Left nare sprayed with oxymetazoline and lidocaine 4%. Bayonet forceps used to removed granulation tissue without any complications.     Return in two weeks for follow-up evaluation. Continue nasal spray bid to left nare up uptil stent removal. Finish bottle of tobradex one drop qid to the left eye.

## 2024-03-08 RX ORDER — HYDROXYZINE HYDROCHLORIDE 25 MG/1
25 TABLET, FILM COATED ORAL NIGHTLY PRN
Qty: 30 TABLET | Refills: 2 | Status: SHIPPED | OUTPATIENT
Start: 2024-03-08 | End: 2024-04-16 | Stop reason: SDUPTHER

## 2024-03-08 NOTE — TELEPHONE ENCOUNTER
Refill Encounter    PCP Visits: Recent Visits  No visits were found meeting these conditions.  Showing recent visits within past 360 days and meeting all other requirements  Future Appointments  No visits were found meeting these conditions.  Showing future appointments within next 720 days and meeting all other requirements     Last 3 Blood Pressure:   BP Readings from Last 3 Encounters:   02/27/24 137/84   02/16/24 110/78   02/08/24 120/70     Preferred Pharmacy:   [facility][id number] - [place], [place] - [address] AT [facility] [address]  [address]  [facility] [address]  Phone: [phone] Fax: [phone]    [facility] - [place], [place] - [address] [facility]  [address] [place] [address]  Phone: [phone] Fax: [phone]    [facility]/pharmacy #[id number] - [place], [place] - [address]  [address] [facility] [address]  Phone: [phone] Fax: [phone]    [facility] - [place], [place] - [address] [place] [address]  [address] [place] [address]  Phone: [phone] Fax: [phone]    [facility]/pharmacy #[id number] - [place] - [address]  [address]  [facility] [address]  Phone: [phone] Fax: [phone]    Requested RX:  Requested Prescriptions     Pending Prescriptions Disp Refills    hydrOXYzine HCL (ATARAX) 25 MG tablet       Sig: Take 1 tablet (25 mg total) by mouth every evening.      RX Route: Normal

## 2024-03-21 ENCOUNTER — OFFICE VISIT (OUTPATIENT)
Dept: OPHTHALMOLOGY | Facility: CLINIC | Age: 39
End: 2024-03-21
Payer: COMMERCIAL

## 2024-03-21 DIAGNOSIS — H04.552 NLDO, ACQUIRED (NASOLACRIMAL DUCT OBSTRUCTION), LEFT: Primary | ICD-10-CM

## 2024-03-21 PROCEDURE — 99213 OFFICE O/P EST LOW 20 MIN: CPT | Mod: 25,S$GLB,, | Performed by: OPHTHALMOLOGY

## 2024-03-21 PROCEDURE — 1159F MED LIST DOCD IN RCRD: CPT | Mod: CPTII,S$GLB,, | Performed by: OPHTHALMOLOGY

## 2024-03-21 PROCEDURE — 92285 EXTERNAL OCULAR PHOTOGRAPHY: CPT | Mod: S$GLB,,, | Performed by: OPHTHALMOLOGY

## 2024-03-21 PROCEDURE — 31237 NSL/SINS NDSC SURG BX POLYPC: CPT | Mod: LT,S$GLB,, | Performed by: OPHTHALMOLOGY

## 2024-03-21 PROCEDURE — 1160F RVW MEDS BY RX/DR IN RCRD: CPT | Mod: CPTII,S$GLB,, | Performed by: OPHTHALMOLOGY

## 2024-03-21 PROCEDURE — 99999 PR PBB SHADOW E&M-EST. PATIENT-LVL III: CPT | Mod: PBBFAC,,, | Performed by: OPHTHALMOLOGY

## 2024-03-21 NOTE — PROGRESS NOTES
ANGELIKA    Ray Ugarte is a/an 38 y.o. female here for 3 week follow up.  Date of procedure: 2/27/2024  Procedure:  Endoscopic Dacryocystorhinostomy with placement of Mirza   stents, lacrimal sac biopsy  Oral antibiotics: NONE   Eye meds: ATS DAILY PRN    Patient doing well. Tearing has improved. Patient c/o soreness/pain at the   site occasionally.   Last edited by Debi Stanley on 3/21/2024  9:03 AM.            Assessment /Plan     For exam results, see Encounter Report.    Nldo, acquired (nasolacrimal duct obstruction), left  -     External Photography - OU - Both Eyes      Patient doing well! Tearing improved. Post-operative instructions reviewed. All questions answered.     Nasal endoscopy:    OS: Normal inferior turbinate and middle turbinate with mirza stent emanating from common canaliculus with adjacent fluorescein dye and adjacent granulation tissue    Procedure note:   Verbal consent obtained prior. Left nare sprayed with oxymetazoline and lidocaine 4%. Bayonet forceps used to removed granulation tissue without any complications.     Return in 3 weeks for follow-up evaluation. Continue nasal spray bid to left nare up uptil stent removal.

## 2024-04-04 ENCOUNTER — OFFICE VISIT (OUTPATIENT)
Dept: OPHTHALMOLOGY | Facility: CLINIC | Age: 39
End: 2024-04-04
Payer: COMMERCIAL

## 2024-04-04 DIAGNOSIS — H04.552 NLDO, ACQUIRED (NASOLACRIMAL DUCT OBSTRUCTION), LEFT: Primary | ICD-10-CM

## 2024-04-04 PROCEDURE — 99999 PR PBB SHADOW E&M-EST. PATIENT-LVL III: CPT | Mod: PBBFAC,,, | Performed by: OPHTHALMOLOGY

## 2024-04-04 PROCEDURE — 1160F RVW MEDS BY RX/DR IN RCRD: CPT | Mod: CPTII,S$GLB,, | Performed by: OPHTHALMOLOGY

## 2024-04-04 PROCEDURE — 31231 NASAL ENDOSCOPY DX: CPT | Mod: S$GLB,,, | Performed by: OPHTHALMOLOGY

## 2024-04-04 PROCEDURE — 1159F MED LIST DOCD IN RCRD: CPT | Mod: CPTII,S$GLB,, | Performed by: OPHTHALMOLOGY

## 2024-04-04 PROCEDURE — 99214 OFFICE O/P EST MOD 30 MIN: CPT | Mod: 25,S$GLB,, | Performed by: OPHTHALMOLOGY

## 2024-04-04 RX ORDER — DOXYCYCLINE 100 MG/1
100 CAPSULE ORAL 2 TIMES DAILY
Qty: 14 CAPSULE | Refills: 0 | Status: SHIPPED | OUTPATIENT
Start: 2024-04-04

## 2024-04-04 NOTE — PROGRESS NOTES
HPI    Ray Ugarte is a/an 38 y.o. female here for 6 week follow up.  Date of procedure: 2/27/2024  Procedure:  Endoscopic Dacryocystorhinostomy with placement of Mirza   stents, lacrimal sac biopsy  Oral antibiotics: NONE   Eye meds: ATS DAILY PRN / nasal spray BID    Patient doing well. Tearing has improved. Patient sts she caught a cold   and is on day 5 of symptoms. Stuffy nose today.  Last edited by Debi Stanley on 4/4/2024  9:09 AM.            Assessment /Plan     For exam results, see Encounter Report.    Nldo, acquired (nasolacrimal duct obstruction), left  -     External Photography - OU - Both Eyes      Patient doing well! Tearing improved. Post-operative instructions reviewed. All questions answered.     Nasal endoscopy:        Procedure note:   Verbal consent obtained prior. Left nare sprayed with oxymetazoline and lidocaine 4%. OS: Normal inferior turbinate and middle turbinate with mirza stent emanating from common canaliculus with adjacent fluorescein dye and minimal granulation tissue      Recommend using flonase OTC for two week and then stop. Start doxycycline 100 mg po bid for 7 days for sinus congestion.     Return as needed.

## 2024-04-16 RX ORDER — HYDROXYZINE HYDROCHLORIDE 25 MG/1
25 TABLET, FILM COATED ORAL NIGHTLY PRN
Qty: 30 TABLET | Refills: 2 | Status: SHIPPED | OUTPATIENT
Start: 2024-04-16 | End: 2024-05-07 | Stop reason: SDUPTHER

## 2024-04-17 RX ORDER — NEOMYCIN SULFATE, POLYMYXIN B SULFATE AND DEXAMETHASONE 3.5; 10000; 1 MG/ML; [USP'U]/ML; MG/ML
1 SUSPENSION/ DROPS OPHTHALMIC 4 TIMES DAILY
Qty: 5 ML | Refills: 0 | Status: SHIPPED | OUTPATIENT
Start: 2024-04-17

## 2024-05-08 RX ORDER — HYDROXYZINE HYDROCHLORIDE 25 MG/1
25 TABLET, FILM COATED ORAL NIGHTLY PRN
Qty: 30 TABLET | Refills: 2 | Status: SHIPPED | OUTPATIENT
Start: 2024-05-08 | End: 2024-05-20 | Stop reason: SDUPTHER

## 2024-05-10 ENCOUNTER — PATIENT MESSAGE (OUTPATIENT)
Dept: OBSTETRICS AND GYNECOLOGY | Facility: CLINIC | Age: 39
End: 2024-05-10
Payer: COMMERCIAL

## 2024-05-21 NOTE — TELEPHONE ENCOUNTER
Refill Encounter    PCP Visits: Recent Visits  No visits were found meeting these conditions.  Showing recent visits within past 360 days and meeting all other requirements  Future Appointments  No visits were found meeting these conditions.  Showing future appointments within next 720 days and meeting all other requirements     Last 3 Blood Pressure:   BP Readings from Last 3 Encounters:   02/27/24 137/84   02/16/24 110/78   02/08/24 120/70     Preferred Pharmacy:   [facility] #[id number] - [place], [place] - [address] AT [facility] [address]  [address]  [facility] [address]  Phone: [phone] Fax: [phone]    [facility] - [place], [place] - [address] [facility]  [address] [place] [address]  Phone: [phone] Fax: [phone]    [facility]/pharmacy #[id number] - [place], [place] - [address]  [address] [facility] [address]  Phone: [phone] Fax: [phone]    [facility] - [place], [place] - [address] [place] [address]  Phone: [phone] Fax: [phone]    [facility]/pharmacy #[id number] - [place] - [address]  [address]  [facility] [address]  Phone: [phone] Fax: [phone]    Requested RX:  Requested Prescriptions     Pending Prescriptions Disp Refills    hydrOXYzine HCL (ATARAX) 25 MG tablet 30 tablet 2     Sig: Take 1 tablet (25 mg total) by mouth nightly as needed for Itching.      RX Route: Normal

## 2024-05-23 ENCOUNTER — TELEPHONE (OUTPATIENT)
Dept: FAMILY MEDICINE | Facility: CLINIC | Age: 39
End: 2024-05-23
Payer: COMMERCIAL

## 2024-05-23 NOTE — TELEPHONE ENCOUNTER
----- Message from Beatrice Whitley sent at 5/23/2024  8:14 AM CDT -----  Regarding: Medication  Refill  Request                  Reply in [facility]: YES      Please refill the medication listed below. Please call the patient     [phone] (X)         Medication     hydrOXYzine HCL (ATARAX) 25 MG tablet  /  30 day supply       Preferred Pharmacy:  [facility]/pharmacy #[id number] - [place] - [address]   Phone: [phone]  Fax: [phone]

## 2024-05-23 NOTE — TELEPHONE ENCOUNTER
Refill Encounter    PCP Visits: Recent Visits  No visits were found meeting these conditions.  Showing recent visits within past 360 days and meeting all other requirements  Future Appointments  No visits were found meeting these conditions.  Showing future appointments within next 720 days and meeting all other requirements     Last 3 Blood Pressure:   BP Readings from Last 3 Encounters:   02/27/24 137/84   02/16/24 110/78   02/08/24 120/70     Preferred Pharmacy:   [facility]/pharmacy #[id number] - [place] - [address]  [address] [place] [address]  Phone: [phone] Fax: [phone]    [facility] #[id number] - [place], [place] - [address] AT [facility] [address]  [address]  [facility] [address]  Phone: [phone] Fax: [phone]    [facility] - [place], [place] - [address] [address]  Phone: [phone] Fax: [phone]    [facility]/pharmacy #[id number] - [place], [place] - [address]  [address] [address]  Phone: [phone] Fax: [phone]    [facility] - [place], [place] - [address] [facility]  [address] [facility] [address]  Phone: [phone] Fax: [phone]    Requested RX:  Requested Prescriptions      No prescriptions requested or ordered in this encounter      RX Route: Normal

## 2024-05-24 RX ORDER — HYDROXYZINE HYDROCHLORIDE 25 MG/1
25 TABLET, FILM COATED ORAL NIGHTLY PRN
Qty: 30 TABLET | Refills: 2 | Status: SHIPPED | OUTPATIENT
Start: 2024-05-24

## 2024-05-29 ENCOUNTER — PATIENT MESSAGE (OUTPATIENT)
Dept: FAMILY MEDICINE | Facility: CLINIC | Age: 39
End: 2024-05-29
Payer: COMMERCIAL

## 2024-06-20 ENCOUNTER — OFFICE VISIT (OUTPATIENT)
Dept: OPHTHALMOLOGY | Facility: CLINIC | Age: 39
End: 2024-06-20
Payer: COMMERCIAL

## 2024-06-20 DIAGNOSIS — H04.552 NLDO, ACQUIRED (NASOLACRIMAL DUCT OBSTRUCTION), LEFT: Primary | ICD-10-CM

## 2024-06-20 PROCEDURE — 99999 PR PBB SHADOW E&M-EST. PATIENT-LVL III: CPT | Mod: PBBFAC,,, | Performed by: OPHTHALMOLOGY

## 2024-06-20 RX ORDER — PREDNISOLONE ACETATE 10 MG/ML
SUSPENSION/ DROPS OPHTHALMIC
Qty: 5 ML | Refills: 0 | Status: SHIPPED | OUTPATIENT
Start: 2024-06-20

## 2024-06-20 NOTE — PROGRESS NOTES
ANGELIKA Ugarte is a/an 38 y.o. female here for follow up.  Date of procedure: 2/27/2024  Procedure:  Endoscopic Dacryocystorhinostomy with placement of Mirza   stents, lacrimal sac biopsy  Oral antibiotics: NONE   Eye meds: ATS DAILY PRN     Patient presents today stating OS is starting to heavily tear with some   minor stinging pain as well, pr began to notice issues a couple of months   ago.   Last edited by Kwesi Ojeda on 6/20/2024  9:26 AM.            Assessment /Plan     For exam results, see Encounter Report.    Nldo, acquired (nasolacrimal duct obstruction), left      The patient is a pleasant 38-year-old female here for follow-up after left endoscopic dacryocystorhinostomy with placement of Mirza stent in February of 2024.  The patient has noticed a proximally 2 months of recurrent tearing on the left side.  It is persistent and there is nothing that makes it better.  The patient did not have any specific trauma or any inciting event prior to the recurrence of the tearing.    On exam, the patient has an elevated tear [place] on the left side.  She has 2+ papillary reaction along the tarsal conjunctiva.  Her punctum is in good position.  She has good lower eyelid tone.      Irrigation:    OS:patent lower punctum, canaliculus, and nld with 0% flow to the nose and 100% reflux      Procedure note:   Verbal consent obtained prior. Left nare sprayed with oxymetazoline and lidocaine 4%. OS: Normal inferior turbinate and middle turbinate with pyogenic granuloma at ostium site with trace adjacent fluorescein dye    These findings were discussed with the patient.      We discussed the option of revision endoscopic DCR with placement of mitomycin-C after removal of granulation tissue versus topical prednisolone acetate drops in addition to Flonase via the intranasal route.  The patient would prefer medical therapy with topical prednisolone acetate drops and Flonase at this time.      Plan for Pred Forte  q.i.d. to the left eye for 3 weeks, followed by weekly taper down to 1 drop per week and then stop.  At the same time, the patient will use Flonase twice daily to the left nare.  The goal would be to decrease the size of the pyogenic granuloma and allow better tear flow into her nose.      If there is no significant improvement in 6 weeks, would recommend revision DCR on the left side with placement of mitomycin C removal of granulation tissue.

## 2024-06-24 DIAGNOSIS — H04.552 NLDO, ACQUIRED (NASOLACRIMAL DUCT OBSTRUCTION), LEFT: ICD-10-CM

## 2024-06-24 RX ORDER — PREDNISOLONE ACETATE 10 MG/ML
SUSPENSION/ DROPS OPHTHALMIC
Qty: 5 ML | Refills: 0 | Status: SHIPPED | OUTPATIENT
Start: 2024-06-24 | End: 2024-06-26 | Stop reason: SDUPTHER

## 2024-06-26 DIAGNOSIS — H04.552 NLDO, ACQUIRED (NASOLACRIMAL DUCT OBSTRUCTION), LEFT: ICD-10-CM

## 2024-06-26 RX ORDER — PREDNISOLONE ACETATE 10 MG/ML
SUSPENSION/ DROPS OPHTHALMIC
Qty: 5 ML | Refills: 0 | Status: SHIPPED | OUTPATIENT
Start: 2024-06-26

## 2024-06-26 NOTE — TELEPHONE ENCOUNTER
----- Message from Elena Rodriguez sent at 6/26/2024  9:57 AM CDT -----  Contact: pt @ [phone]  Rx Refill/Request    Is this a Refill or New Rx:  refill    Rx Name and Strength:  prednisoLONE acetate (PRED FORTE) 1 % DrpS    Preferred Pharmacy with phone number:[facility]/pharmacy #[id number] - [place] - [address]  [address] [place] [address]  Phone: [phone] Fax: [phone]      Communication Preference:pt need to speak with someone in office concerning appt 7/31 asking for call back     Additional Information

## 2024-06-28 ENCOUNTER — PATIENT MESSAGE (OUTPATIENT)
Dept: OPHTHALMOLOGY | Facility: CLINIC | Age: 39
End: 2024-06-28
Payer: COMMERCIAL

## 2024-07-09 ENCOUNTER — OFFICE VISIT (OUTPATIENT)
Dept: OPTOMETRY | Facility: CLINIC | Age: 39
End: 2024-07-09
Payer: COMMERCIAL

## 2024-07-09 DIAGNOSIS — H04.552 NLDO, ACQUIRED (NASOLACRIMAL DUCT OBSTRUCTION), LEFT: Primary | ICD-10-CM

## 2024-07-09 PROCEDURE — 1159F MED LIST DOCD IN RCRD: CPT | Mod: CPTII,S$GLB,, | Performed by: OPTOMETRIST

## 2024-07-09 PROCEDURE — 1160F RVW MEDS BY RX/DR IN RCRD: CPT | Mod: CPTII,S$GLB,, | Performed by: OPTOMETRIST

## 2024-07-09 PROCEDURE — 92002 INTRM OPH EXAM NEW PATIENT: CPT | Mod: S$GLB,,, | Performed by: OPTOMETRIST

## 2024-07-09 PROCEDURE — 99999 PR PBB SHADOW E&M-EST. PATIENT-LVL III: CPT | Mod: PBBFAC,,, | Performed by: OPTOMETRIST

## 2024-07-09 NOTE — PROGRESS NOTES
"HPI    TRU: 06/20/2024   Last DFE: unsure, DFE deferred today  Chief complaint (CC): 38 year old female is here today for 2 week IOP   check per Dr. Kaba.  Glasses? Yes   Contacts? No  H/o eye surgery, injections or laser:  Endoscopic Dacryocystorhinostomy   with placement of Mirza stents, lacrimal sac biopsy  H/o eye injury: No  Known eye conditions? Tear duct   Family h/o eye conditions? No  Eye gtts? Artifical tears prn  Pred Forte q.i.d. to the left eye for 3 weeks      (-) Flashes (-)  Floaters (-) Mucous   (+)  Tearing (+) Itching (-) Burning   (-) Headaches (-) Eye Pain/discomfort (+) Irritation   (-)  Redness (-) Double vision (-) Blurry vision    Diabetic? No   A1c? No results found for: "LABA1C", "HGBA1C"       Last edited by Elayne Dinh, OD on 7/9/2024  2:32 PM.            Assessment /Plan     For exam results, see Encounter Report.    Nldo, acquired (nasolacrimal duct obstruction), left   - IOP normotensive OU today    - F/u as scheduled with Dr. Kaba 7/31/24   - Pt also scheduled for routine eye exam + CL fitting on 7/25/24                 "

## 2024-07-24 ENCOUNTER — TELEPHONE (OUTPATIENT)
Dept: OPTOMETRY | Facility: CLINIC | Age: 39
End: 2024-07-24
Payer: COMMERCIAL

## 2024-07-25 ENCOUNTER — OFFICE VISIT (OUTPATIENT)
Dept: OPTOMETRY | Facility: CLINIC | Age: 39
End: 2024-07-25
Payer: COMMERCIAL

## 2024-07-25 DIAGNOSIS — Z46.0 ENCOUNTER FOR FITTING AND ADJUSTMENT OF SPECTACLES AND CONTACT LENSES: ICD-10-CM

## 2024-07-25 DIAGNOSIS — H52.13 MYOPIA OF BOTH EYES WITH REGULAR ASTIGMATISM: Primary | ICD-10-CM

## 2024-07-25 DIAGNOSIS — H04.552 NLDO, ACQUIRED (NASOLACRIMAL DUCT OBSTRUCTION), LEFT: ICD-10-CM

## 2024-07-25 DIAGNOSIS — Z46.0 ENCOUNTER FOR FITTING AND ADJUSTMENT OF SPECTACLES AND CONTACT LENSES: Primary | ICD-10-CM

## 2024-07-25 DIAGNOSIS — Z98.890 S/P LASIK SURGERY OF BOTH EYES: ICD-10-CM

## 2024-07-25 DIAGNOSIS — H04.213 EPIPHORA DUE TO EXCESS LACRIMATION OF BOTH SIDES: ICD-10-CM

## 2024-07-25 DIAGNOSIS — H52.223 MYOPIA OF BOTH EYES WITH REGULAR ASTIGMATISM: Primary | ICD-10-CM

## 2024-07-25 PROCEDURE — 99999 PR PBB SHADOW E&M-EST. PATIENT-LVL II: CPT | Mod: PBBFAC,,, | Performed by: OPTOMETRIST

## 2024-07-25 PROCEDURE — 99999 PR PBB SHADOW E&M-EST. PATIENT-LVL III: CPT | Mod: PBBFAC,,, | Performed by: OPTOMETRIST

## 2024-07-25 NOTE — PROGRESS NOTES
"HPI    TRU: 07/09/2024 Dr. Dinh   Last DFE: unsure, DFE deferred today  Chief complaint (CC): 38 year old female is here today for Routine eye   exam  Glasses? Yes   Contacts? No  H/o eye surgery, injections or laser:  Endoscopic Dacryocystorhinostomy   with placement of Mirza stents, lacrimal sac biopsy  H/o eye injury: No  Known eye conditions? Tear duct   Family h/o eye conditions? No  Eye gtts? No        (-) Flashes (-)  Floaters (-) Mucous   (+)  Tearing (+) Itching (-) Burning   (-) Headaches (+) Eye Pain/discomfort (+) Irritation   (-)  Redness (-) Double vision (+) Blurry vision, OD     Diabetic? No   A1c? No results found for: "LABA1C", "HGBA1C"       Last edited by Cristina Foote on 7/25/2024  9:18 AM.            Assessment /Plan     For exam results, see Encounter Report.    Myopia of both eyes with regular astigmatism    Nldo, acquired (nasolacrimal duct obstruction), left    Epiphora due to excess lacrimation of both sides    Encounter for fitting and adjustment of spectacles and contact lenses    S/P LASIK surgery of both eyes      MONITOR. ED PT ON ALL EXAM FINDINGS  RX FINAL SPECS. TRIAL CLS. SCHEDULED PT FOR I&R TRAINING; START DAILIBRENDEN  H/O NLDO; CONTINUE WITH DR. ASTUDILLO FOR OCULOPLASTICS F/U AND MGMT  RTC 1 YR//PRN FOR REE/DFE    "

## 2024-07-26 ENCOUNTER — TELEPHONE (OUTPATIENT)
Dept: OPHTHALMOLOGY | Facility: CLINIC | Age: 39
End: 2024-07-26
Payer: COMMERCIAL

## 2024-07-29 ENCOUNTER — TELEPHONE (OUTPATIENT)
Dept: OPHTHALMOLOGY | Facility: CLINIC | Age: 39
End: 2024-07-29
Payer: COMMERCIAL

## 2024-07-30 ENCOUNTER — PATIENT MESSAGE (OUTPATIENT)
Dept: OPHTHALMOLOGY | Facility: CLINIC | Age: 39
End: 2024-07-30
Payer: COMMERCIAL

## 2024-07-30 ENCOUNTER — TELEPHONE (OUTPATIENT)
Dept: OPHTHALMOLOGY | Facility: CLINIC | Age: 39
End: 2024-07-30
Payer: COMMERCIAL

## 2024-07-30 NOTE — TELEPHONE ENCOUNTER
----- Message from Chester Tyson sent at 7/30/2024 10:52 AM CDT -----  Contact: [phone]  Pt is calling to see if she can be seen in the afternoon around her original time. Please call back to further assist.

## 2024-07-31 ENCOUNTER — OFFICE VISIT (OUTPATIENT)
Dept: OPHTHALMOLOGY | Facility: CLINIC | Age: 39
End: 2024-07-31
Payer: COMMERCIAL

## 2024-07-31 DIAGNOSIS — H04.552 NLDO, ACQUIRED (NASOLACRIMAL DUCT OBSTRUCTION), LEFT: Primary | ICD-10-CM

## 2024-07-31 DIAGNOSIS — L98.0 PYOGENIC GRANULOMA: ICD-10-CM

## 2024-07-31 PROCEDURE — 99214 OFFICE O/P EST MOD 30 MIN: CPT | Mod: 25,S$GLB,, | Performed by: OPHTHALMOLOGY

## 2024-07-31 PROCEDURE — 92285 EXTERNAL OCULAR PHOTOGRAPHY: CPT | Mod: S$GLB,,, | Performed by: OPHTHALMOLOGY

## 2024-07-31 PROCEDURE — 1159F MED LIST DOCD IN RCRD: CPT | Mod: CPTII,S$GLB,, | Performed by: OPHTHALMOLOGY

## 2024-07-31 PROCEDURE — 99999 PR PBB SHADOW E&M-EST. PATIENT-LVL III: CPT | Mod: PBBFAC,,, | Performed by: OPHTHALMOLOGY

## 2024-07-31 PROCEDURE — 31231 NASAL ENDOSCOPY DX: CPT | Mod: S$GLB,,, | Performed by: OPHTHALMOLOGY

## 2024-07-31 PROCEDURE — 1160F RVW MEDS BY RX/DR IN RCRD: CPT | Mod: CPTII,S$GLB,, | Performed by: OPHTHALMOLOGY

## 2024-07-31 RX ORDER — PREDNISOLONE ACETATE 10 MG/ML
SUSPENSION/ DROPS OPHTHALMIC
Qty: 5 ML | Refills: 0 | Status: SHIPPED | OUTPATIENT
Start: 2024-07-31 | End: 2024-08-04

## 2024-07-31 NOTE — PROGRESS NOTES
HPI    Patient presents for f/u tearing OS which is persistent after use of PF   and flonase nasal spray. She is interested in discussing the procedure   again today.    S/p endoscopic DCR OS  Last edited by Debi Stanley on 7/31/2024  9:24 AM.            Assessment /Plan     For exam results, see Encounter Report.    Nldo, acquired (nasolacrimal duct obstruction), left    Pyogenic granuloma      S/p left endoscopic dcr with pyogenic granuloma at ostia site. Improvement of tearing with PF taper and flonase bid after last visit. Tearing still painful and rated at 5/10.     Procedure note:   Verbal consent obtained prior. Left nare sprayed with oxymetazoline and lidocaine 4%. OS: Normal inferior turbinate and middle turbinate with smaller pyogenic granuloma at ostium site with trace adjacent fluorescein dye    We discussed the option of revision endoscopic DCR with placement of mitomycin-C after removal of granulation tissue versus topical prednisolone acetate drops in addition to Flonase via the intranasal route.  The patient would prefer medical therapy with topical prednisolone acetate drops and Flonase at this time.      Plan for Pred Forte q.i.d. to the left eye for 3 weeks, followed by weekly taper down to 1 drop per week and then stop.  At the same time, the patient will use Flonase twice daily to the left nare.  The goal would be to decrease the size of the pyogenic granuloma and allow better tear flow into her nose. Return in two weeks for IOP check with optometry. Handout given with recommendation to start anti-allergy drops at same time.     If there is no significant improvement in 6 weeks, would recommend revision DCR on the left side with placement of mitomycin C with removal of granulation tissue.

## 2024-08-02 DIAGNOSIS — H04.552 NLDO, ACQUIRED (NASOLACRIMAL DUCT OBSTRUCTION), LEFT: ICD-10-CM

## 2024-08-04 RX ORDER — PREDNISOLONE ACETATE 10 MG/ML
SUSPENSION/ DROPS OPHTHALMIC
Qty: 10 ML | Refills: 2 | Status: SHIPPED | OUTPATIENT
Start: 2024-08-04

## 2024-08-12 ENCOUNTER — TELEPHONE (OUTPATIENT)
Dept: OPHTHALMOLOGY | Facility: CLINIC | Age: 39
End: 2024-08-12
Payer: COMMERCIAL

## 2024-08-12 NOTE — TELEPHONE ENCOUNTER
----- Message from Deedee Merritt sent at 8/12/2024  1:38 PM CDT -----  Regarding: FW: Appt Issue  Contact: KATE VALLADARES [[id number]]  Good afternoon!    Pt says she has been experiencing issues with the pharmacy and has just now received her eye drops, so she needs everything rescheduled. I just rescheduled one of the pt's appointments, she needs the Dr. Kaba appointment rescheduled to somewhere around the same time as the other one.    The pt prefers Thursdays, early in the morning, not too soon or too far past 9/12. I did my best. Pt is comfortable with being rescheduled for her pressure check if no appointments with Dr. Kaba are available.      Callback number [phone]  ----- Message -----  From: Aliyah Mari  Sent: 8/12/2024  10:11 AM CDT  To: Deedee Merritt  Subject: FW: Appt Issue                                     ----- Message -----  From: Inge Cheek  Sent: 8/12/2024   9:03 AM CDT  To: Erin Cisneros Staff  Subject: FW: Appt Issue                                     ----- Message -----  From: Anabel Reyes  Sent: 8/12/2024   8:58 AM CDT  To: Nadir Masters Staff  Subject: Appt Issue                                       CONSULT/ADVISORY    Name of Caller:  KATE VALLADARES [[id number]]    Contact Preference:  [phone] (home)       Nature of Call:  Pt is requesting appt be r/s to a later date due to she is having issues in getting the eye drops that were prescribed for her.  Please call.

## 2024-08-21 RX ORDER — HYDROXYZINE HYDROCHLORIDE 25 MG/1
25 TABLET, FILM COATED ORAL NIGHTLY PRN
Qty: 30 TABLET | Refills: 2 | OUTPATIENT
Start: 2024-08-21

## 2024-08-21 NOTE — TELEPHONE ENCOUNTER
LVM for pt  to call the office back to schedule a virtual appt with Dr Mccoy  to get her meds refill

## 2024-08-22 ENCOUNTER — OFFICE VISIT (OUTPATIENT)
Dept: FAMILY MEDICINE | Facility: CLINIC | Age: 39
End: 2024-08-22
Attending: FAMILY MEDICINE
Payer: COMMERCIAL

## 2024-08-22 VITALS — WEIGHT: 150 LBS | BODY MASS INDEX: 24.11 KG/M2 | HEIGHT: 66 IN

## 2024-08-22 DIAGNOSIS — L30.1 DYSHIDROTIC ECZEMA: ICD-10-CM

## 2024-08-22 DIAGNOSIS — G47.00 INSOMNIA, UNSPECIFIED TYPE: Primary | ICD-10-CM

## 2024-08-22 PROCEDURE — 3008F BODY MASS INDEX DOCD: CPT | Mod: CPTII,95,, | Performed by: FAMILY MEDICINE

## 2024-08-22 PROCEDURE — 99214 OFFICE O/P EST MOD 30 MIN: CPT | Mod: 95,,, | Performed by: FAMILY MEDICINE

## 2024-08-22 RX ORDER — HYDROXYZINE HYDROCHLORIDE 25 MG/1
25 TABLET, FILM COATED ORAL NIGHTLY
Qty: 90 TABLET | Refills: 0 | Status: SHIPPED | OUTPATIENT
Start: 2024-08-22

## 2024-08-22 RX ORDER — CLOBETASOL PROPIONATE 0.5 MG/G
CREAM TOPICAL 2 TIMES DAILY
Qty: 45 G | Refills: 0 | Status: SHIPPED | OUTPATIENT
Start: 2024-08-22

## 2024-08-22 NOTE — PROGRESS NOTES
The patient location is: [place]  The chief complaint leading to consultation is: insomnia    Visit type: audiovisual    Face to Face time with patient: 20  30 minutes of total time spent on the encounter, which includes face to face time and non-face to face time preparing to see the patient (eg, review of tests), Obtaining and/or reviewing separately obtained history, Documenting clinical information in the electronic or other health record, Independently interpreting results (not separately reported) and communicating results to the patient/family/caregiver, or Care coordination (not separately reported).         Each patient to whom he or she provides medical services by telemedicine is:  (1) informed of the relationship between the physician and patient and the respective role of any other health care provider with respect to management of the patient; and (2) notified that he or she may decline to receive medical services by telemedicine and may withdraw from such care at any time.    Notes:   Subjective:       Patient ID: Ray Ugarte is a 38 y.o. female.    Chief Complaint: Insomnia    HPI  Pt is in virtual visit for follow up of insomnia pt is on atarax qhs stable no new complaints  Pt has eczema on her hands uses clobetasol prn needs new script no new complaints it works well for her  Review of Systems   Constitutional:  Negative for activity change, chills, fatigue, fever and unexpected weight change.   HENT:  Negative for hearing loss, rhinorrhea and trouble swallowing.    Eyes:  Negative for discharge and visual disturbance.   Respiratory:  Negative for chest tightness and wheezing.    Cardiovascular:  Negative for chest pain and palpitations.   Gastrointestinal:  Negative for blood in stool, constipation, diarrhea and vomiting.   Endocrine: Negative for polydipsia and polyuria.   Genitourinary:  Negative for difficulty urinating, dysuria, hematuria and menstrual problem.   Musculoskeletal:  " Negative for arthralgias, joint swelling and neck pain.   Neurological:  Negative for weakness and headaches.   Psychiatric/Behavioral:  Negative for confusion and dysphoric mood.        Objective:    Ht 5' 6" (1.676 m)   Wt 68 kg (150 lb)   BMI 24.21 kg/m²     Physical Exam  Constitutional:       Appearance: Normal appearance. She is not ill-appearing.   HENT:      Head: Normocephalic and atraumatic.   Pulmonary:      Effort: Pulmonary effort is normal. No respiratory distress.   Neurological:      General: No focal deficit present.      Mental Status: She is alert and oriented to person, place, and time.      Cranial Nerves: No cranial nerve deficit.      Coordination: Coordination normal.         Assessment:       1. Insomnia, unspecified type    2. Dyshidrotic eczema        Plan:     Orders declined  Cont meds  Avoid caffeine   Avoid etoh  F/u derm   Rtc for annual        "This note will not be shared with the patient."   "

## 2024-08-26 ENCOUNTER — OFFICE VISIT (OUTPATIENT)
Dept: OPTOMETRY | Facility: CLINIC | Age: 39
End: 2024-08-26
Payer: COMMERCIAL

## 2024-08-26 DIAGNOSIS — L98.0 PYOGENIC GRANULOMA: ICD-10-CM

## 2024-08-26 DIAGNOSIS — H04.552 NLDO, ACQUIRED (NASOLACRIMAL DUCT OBSTRUCTION), LEFT: Primary | ICD-10-CM

## 2024-08-26 PROCEDURE — 1159F MED LIST DOCD IN RCRD: CPT | Mod: CPTII,S$GLB,, | Performed by: OPTOMETRIST

## 2024-08-26 PROCEDURE — 99999 PR PBB SHADOW E&M-EST. PATIENT-LVL II: CPT | Mod: PBBFAC,,, | Performed by: OPTOMETRIST

## 2024-08-26 PROCEDURE — 1160F RVW MEDS BY RX/DR IN RCRD: CPT | Mod: CPTII,S$GLB,, | Performed by: OPTOMETRIST

## 2024-08-26 PROCEDURE — 92012 INTRM OPH EXAM EST PATIENT: CPT | Mod: S$GLB,,, | Performed by: OPTOMETRIST

## 2024-08-26 NOTE — PROGRESS NOTES
HPI    TRU: 7/25/2024  Chief complaint (CC): 39 yo F here for here for IOP check per    after starting PF drops. Pt denies any ocular or visual complaints today.   Glasses? +  Contacts? -  H/o eye surgery, injections or laser: -  H/o eye injury: -  Known eye conditions? -  Family h/o eye conditions? -  Eye gtts?   Pred Forte QID x 2wks  Now transitioning to TID X 1 wk      (-) Flashes (-)  Floaters (-) Mucous   (-)  Tearing (-) Itching (-) Burning   (-) Headaches (-) Eye Pain/discomfort (-) Irritation   (-)  Redness (-) Double vision (-) Blurry vision    Diabetic? -  A1c? -  Last edited by Elayne Dinh, OD on 8/26/2024  3:06 PM.            Assessment /Plan     For exam results, see Encounter Report.        Nldo, acquired (nasolacrimal duct obstruction), left  Pyogenic granuloma              - IOP normotensive OU today (8/8)              - F/u as scheduled with Dr. Kaba 9/12/24

## 2024-09-04 ENCOUNTER — TELEPHONE (OUTPATIENT)
Dept: OPHTHALMOLOGY | Facility: CLINIC | Age: 39
End: 2024-09-04
Payer: COMMERCIAL

## 2024-09-05 ENCOUNTER — TELEPHONE (OUTPATIENT)
Dept: OPHTHALMOLOGY | Facility: CLINIC | Age: 39
End: 2024-09-05
Payer: COMMERCIAL

## 2024-09-05 NOTE — TELEPHONE ENCOUNTER
----- Message from Chantale Jade sent at 9/5/2024  1:29 PM CDT -----  Contact: pt @ [phone]    ----- Message -----  From: Elena Rodriguez  Sent: 9/5/2024  11:47 AM CDT  To: [facility] Est[facility] Staff    KATE VALLADARES calling regarding Appointment Access  (message) for #pt is calling to reschedule appt 9/12, asking for call back

## 2024-09-27 RX ORDER — HYDROXYZINE HYDROCHLORIDE 25 MG/1
25 TABLET, FILM COATED ORAL NIGHTLY PRN
Qty: 30 TABLET | Refills: 2 | Status: SHIPPED | OUTPATIENT
Start: 2024-09-27

## 2024-10-29 ENCOUNTER — PATIENT MESSAGE (OUTPATIENT)
Dept: OBSTETRICS AND GYNECOLOGY | Facility: CLINIC | Age: 39
End: 2024-10-29

## 2024-10-29 ENCOUNTER — CLINICAL SUPPORT (OUTPATIENT)
Dept: OBSTETRICS AND GYNECOLOGY | Facility: CLINIC | Age: 39
End: 2024-10-29
Payer: COMMERCIAL

## 2024-10-29 DIAGNOSIS — N91.2 AMENORRHEA: Primary | ICD-10-CM

## 2024-10-29 PROCEDURE — 99999 PR PBB SHADOW E&M-EST. PATIENT-LVL II: CPT | Mod: PBBFAC,,,

## 2024-11-18 NOTE — TELEPHONE ENCOUNTER
Refill Encounter    PCP Visits: Recent Visits  Date Type Provider Dept   08/22/24 Office Visit Haylee Mccoy MD [facility]   Showing recent visits within past 360 days and meeting all other requirements  Future Appointments  No visits were found meeting these conditions.  Showing future appointments within next 720 days and meeting all other requirements     Last 3 Blood Pressure:   BP Readings from Last 3 Encounters:   02/27/24 137/84   02/16/24 110/78   02/08/24 120/70     Preferred Pharmacy:   [facility] #[id number] - [place] - [address]  [address]  [place] [address]  Phone: [phone] Fax: [phone]    [id number] - [place], [place] - [address] AT [facility] [address]  [address]  [facility] [address]  Phone: [phone] Fax: [phone]    [facility] [address] [facility]  [address] [place] [address]  Phone: [phone] Fax: [phone]    [facility] #[id number] - [place], [place] - [address]  [address]  [place] [address]  Phone: [phone] Fax: [phone]    [facility] - [place], [place] - [facility] [facility]  [address] [facility] [address]  Phone: [phone] Fax: [phone]    [facility] - [place], [place] - [address]  [address]  [facility] [address]  Phone: [phone] Fax: [phone]    Requested RX:  Requested Prescriptions     Pending Prescriptions Disp Refills    hydrOXYzine HCL (ATARAX) 25 MG tablet [Pharmacy Med Name: HYDROXYZINE HCL 25 MG TABLET] 90 tablet      Sig: TAKE 1 TABLET BY MOUTH EVERY EVENING      RX Route: Normal

## 2024-11-19 ENCOUNTER — OFFICE VISIT (OUTPATIENT)
Dept: OBSTETRICS AND GYNECOLOGY | Facility: CLINIC | Age: 39
End: 2024-11-19
Payer: COMMERCIAL

## 2024-11-19 ENCOUNTER — HOSPITAL ENCOUNTER (OUTPATIENT)
Dept: PERINATAL CARE | Facility: OTHER | Age: 39
Discharge: HOME | End: 2024-11-19
Attending: FAMILY MEDICINE
Payer: COMMERCIAL

## 2024-11-19 ENCOUNTER — PATIENT MESSAGE (OUTPATIENT)
Dept: ADMINISTRATIVE | Facility: OTHER | Age: 39
End: 2024-11-19
Payer: COMMERCIAL

## 2024-11-19 VITALS
WEIGHT: 159.81 LBS | DIASTOLIC BLOOD PRESSURE: 70 MMHG | HEIGHT: 66 IN | SYSTOLIC BLOOD PRESSURE: 96 MMHG | BODY MASS INDEX: 25.68 KG/M2

## 2024-11-19 DIAGNOSIS — Z32.01 POSITIVE PREGNANCY TEST: Primary | ICD-10-CM

## 2024-11-19 DIAGNOSIS — Z36.0 ENCOUNTER FOR ANTENATAL SCREENING FOR CHROMOSOMAL ANOMALIES: ICD-10-CM

## 2024-11-19 DIAGNOSIS — N91.4 SECONDARY OLIGOMENORRHEA: ICD-10-CM

## 2024-11-19 DIAGNOSIS — N91.2 AMENORRHEA: ICD-10-CM

## 2024-11-19 DIAGNOSIS — M25.519 SHOULDER PAIN, UNSPECIFIED CHRONICITY, UNSPECIFIED LATERALITY: ICD-10-CM

## 2024-11-19 LAB
CREAT UR-MCNC: 26 MG/DL (ref 15–325)
PROT UR-MCNC: <7 MG/DL (ref 0–15)
PROT/CREAT UR: NORMAL MG/G{CREAT} (ref 0–0.2)

## 2024-11-19 PROCEDURE — 1159F MED LIST DOCD IN RCRD: CPT | Mod: CPTII,S$GLB,, | Performed by: FAMILY MEDICINE

## 2024-11-19 PROCEDURE — 82570 ASSAY OF URINE CREATININE: CPT | Performed by: FAMILY MEDICINE

## 2024-11-19 PROCEDURE — 3008F BODY MASS INDEX DOCD: CPT | Mod: CPTII,S$GLB,, | Performed by: FAMILY MEDICINE

## 2024-11-19 PROCEDURE — 99213 OFFICE O/P EST LOW 20 MIN: CPT | Mod: S$GLB,,, | Performed by: FAMILY MEDICINE

## 2024-11-19 PROCEDURE — 76801 OB US < 14 WKS SINGLE FETUS: CPT

## 2024-11-19 PROCEDURE — 1160F RVW MEDS BY RX/DR IN RCRD: CPT | Mod: CPTII,S$GLB,, | Performed by: FAMILY MEDICINE

## 2024-11-19 PROCEDURE — 3074F SYST BP LT 130 MM HG: CPT | Mod: CPTII,S$GLB,, | Performed by: FAMILY MEDICINE

## 2024-11-19 PROCEDURE — 87591 N.GONORRHOEAE DNA AMP PROB: CPT | Performed by: FAMILY MEDICINE

## 2024-11-19 PROCEDURE — 87086 URINE CULTURE/COLONY COUNT: CPT | Performed by: FAMILY MEDICINE

## 2024-11-19 PROCEDURE — 3078F DIAST BP <80 MM HG: CPT | Mod: CPTII,S$GLB,, | Performed by: FAMILY MEDICINE

## 2024-11-19 PROCEDURE — 76801 OB US < 14 WKS SINGLE FETUS: CPT | Mod: 26,,, | Performed by: OBSTETRICS & GYNECOLOGY

## 2024-11-19 PROCEDURE — 99999 PR PBB SHADOW E&M-EST. PATIENT-LVL IV: CPT | Mod: PBBFAC,,, | Performed by: FAMILY MEDICINE

## 2024-11-19 RX ORDER — AMOXICILLIN 500 MG
CAPSULE ORAL DAILY
COMMUNITY

## 2024-11-19 RX ORDER — ASPIRIN 81 MG/1
81 TABLET ORAL DAILY
COMMUNITY

## 2024-11-19 RX ORDER — DIPHENHYDRAMINE HCL 25 MG
25 CAPSULE ORAL EVERY 6 HOURS PRN
COMMUNITY

## 2024-11-19 NOTE — PATIENT INSTRUCTIONS
LABOR AND DELIVERY PHONE NUMBER, [phone] (OPEN , LOCATED ON 6TH FLOOR OF HOSPITAL)  [address] PHONE NUMBER, [phone] (OPEN MON-FRI, 8a-5p)     1) Eat small frequent meals through the day versus three large meals  2) Try ginger ale or sprite to help settle the stomach - you can also take ginger capsules (250mg 4 times per day)  3) Eat crackers or dry toast before getting out of bed in the morning   4) Stay hydrated by drinking plenty of water-do not immediately eat or drink something after vomiting. Give your stomach a rest for 20-30 minutes. Slowly reintroduce ice chips, small sips water, crackers, etc.    5) Try OTC unisom (1/2 tablet) and vitamin B6 - take the 25mg b6 twice a day and then both together at night before bed. This can help with the nausea in the morning and is safe to use during pregnancy.  6) Sea bands (Accupressure wrist bands)    If you are unable to keep anything down and constantly vomiting for more that 24 hours, let the office know so that dehydration can be avoided. We would recommend being seen in the emergency department if this is the case.       Topic  General Pregnancy Information Recommended   (Unless Otherwise Contraindicated Or Restrictions Given To You By Your OB Doctor)      1. Anticipated course of prenatal care      Visits: will be Every 4 wks until 28 weeks, then every 2 weeks until 36 weeks, and then weekly until delivery.   Urine will be collected at each Obstetric visit        2. Nutrition and weight gain    Daily pre-michael vitamin (recommend taking at night)   Additional 300 calories needed daily  No Sushi, hotdogs, unpasteurized products (milk/cheeses). No large fish such as: shark, ale mackerel, tile, sword fish   Incorporate 12 ounces of smaller seafood/week and no more than 6oz of albacore tuna   Caffeine: 200 mg/day or 2 cups of caffeine/day   Weight gain recommendations are based off of BMI before pregnancy. Generally patients who with normal weight prior  pregnancy it is recommended 25-35 pounds of weight gain during the pregnancy with an estimated weekly gain of 1 pound/wk in 2nd and 3rd Trimester.    3. Toxoplasmosis precautions  If cats are in the home avoid changing litter boxes and if you need to change the litter box recommended you use gloves   4. Sexual Activity  Sexual activity is okay unless you are put on restrictions by your provider. I recommend urinating after intercourse    5. Exercise  Generally pre-pregnancy routine is okay to continue   Drink plenty fluids for hydration   Stop any activity that causes heavy cramping like a period or bright red bleeding and contact your provider  No extreme or contact sports   No exercise on your back for an extended period of time after 20 weeks    6. Hot Tub/Saunas  Avoid hot tubes and saunas    7. Hair Treatments  Because of the lack of scientific studies on the effects of chemical treatments on your hair, we must advise that you do it at your own risk. If you choose to treat your hair, we recommend that you wait until after 12 weeks gestation. At this time there is no reason to believe that normal hair treatment is associated with onsequences to the baby.    8. Vaccines  Influenza vaccine is recommended by CDC during flu season   Tdap (pertussis or whopping cough) recommended each pregnancy between 27 and 36 weeks   Tdap booster recommended for family and other planned direct caregivers    9. Water  Water is an important nutrient in a good diet. However, it cannot be stressed enough that during pregnancy water is essential. The body has increased circulation through blood vessels, and without a large increase in water, pregnant women will be dehydrated. It plays an important role in decreasing constipation, preventing  contractions, decreasing swelling, and preventing dizziness. We recommend that you drink 8-10 glasses of water per day.    10. Smoking/Alcohol/Illicit Drug Use  No safe Level   Can lead to  problems with pregnancy   Growth of the developing fetus    labor (delivery before 37 weeks)    rupture of the membranes (water breaking before 37 weeks)   Premature separation of the placenta (which may cause bleeding)   American College of Obstetricians and Gynecologists endorses abstinence   Can lead to babies with disabilities    11. Environmental or work hazards  Unless otherwise restricted you may continue work throughout the pregnancy   Notify your provider of any work hazards or chemical exposure concerns   12. Travel    Safe to travel up to 35 weeks   Continue to wear a seatbelt and airbags are still recommended   Drink plenty fluids   Blood clots are a concern during pregnancy with long travel. Recommend compression stockings and moving around at least every 2 hours and staying hydrated.    13. Use of medications, vitamins, herbs, OTC drugs    Any medications not on the list provided to you from our clinic or given to you by one of our providers we recommend calling to make sure the medication is safe for you and baby.    14. Domestic Violence    Please notify office immediately of any concerns or violence so that we can help direct you to assistance needed   [place] [employer]: [phone]    15. Childbirth classes    List of Childbirth classes from [facility] is available    16. Selecting a Pediatrician  Selecting a pediatrician before delivery is recommended  You can interview pediatricians before delivery    17. Fetal Monitoring    A simple test of your babys well-being is a kick count. After 26 weeks, fetal motion of any kind should be monitored. Further discussion at that time   18.  Labor Signs    Water break, leaking fluids from Vagina prior 37 weeks  Regular contractions, Contractions that are more than 5-6/hour, getting stronger and painful with lower back pain, does not go away with rest and fluids    19. Postpartum Family Planning    Multiple  options available from short term methods to long term reversible and irreversible methods   Discuss with provider as you get closer to delivery    20. Dental    It is recommended that you get an annual dental cleaning    21. Breastfeeding    Classes offered at [facility] and it is recommended to take a class    22. Lifting In 2013, the National Pope for Occupational Safety and Health (NIOSH) published clinical guidelines for occupational lifting in uncomplicated pregnancies. The recommended weight limits are based on gestational age, intermittent versus repetitive lifting, time (hours/day) spent lifting, and lifting height from floor and distance in 3 front of body. In this guideline, the maximum permissible weight for a woman less than 20 weeks of gestation performing infrequent lifting is 36 pounds (16 kgs) and the maximum permissible weight at >=20 weeks is 26 pounds (12 kgs). For repetitive lifting >=1 hour/day, the maximum weights in the first and second half of pregnancy are 18 pounds (8 kgs) and 13 pounds (6 kgs), respectively, and for repetitive lifting <1 hour/day, the maximum weights are 30 pounds (14 kgs) and 22 pounds (10 kgs), respectively. Although not based on high quality evidence, these guidelines are a reasonable reference for counseling pregnant women     23. Scheduling and Provider Availability    Your Obstetric Doctor is usually here weekly but not every day. We recommend you make 3-4 advanced appointments at a time to accommodate your personal needs and work/school obligations.   We ask that you come 15 minutes prior your scheduled appointment.   For same day appointments (not routine appointments) there is a Nurse Practitioner or another obstetric provider available. Please let the  aware you are an OB patient requesting a same day appointment.      24. Recommended Phone Jose    [facility]      MEDICATIONS IN PREGNANCY     While some medications are considered safe to take  during pregnancy, the effects of other medications on your unborn baby are unknown. Therefore, it is very important to pay special attention to medications you take while you are pregnant.   If you were taking prescription medications before you became pregnant, please ask your health care provider about continuing these medications as soon as you find out that you are pregnant. Do not stop taking any medications without discussing with your health care provider. Your health care provider will weigh the benefits and risks to your pregnancy when making his or her recommendation. With some medications, the risk of not taking them may be more serious than the potential risk of taking them.   If you are prescribed any new medication, please inform your health care provider that you are pregnant. Be sure to discuss the risks and benefits of the newly prescribed medication with your health care provider before taking the medication. We are always available to answer any questions about new medications and how they relate to your pregnancy.     Are Alternative Pregnancy Medicine Therapies Safe?   Many pregnant women believe natural products can be safely used to relieve nausea, backache, and other annoying symptoms of pregnancy, but many of these so-called natural products have not been tested for their safety and effectiveness. Therefore, it is very important to check with your health care provider before taking any alternative therapies. She will not recommend a product or therapy until it is shown to be safe and effective.     Which Over the Counter Drugs Are Safe?   Prenatal vitamins, now available without a prescription, are safe and important to take during pregnancy. Ask your health care provider about the safety of taking other vitamins, herbal remedies and supplements during pregnancy. Most herbal preparations and supplements have not been proven to be safe during pregnancy. Generally, you should not take any  over-the-counter medication unless it is necessary. The following medications and home remedies have no known harmful effects during pregnancy when taken according to the package directions. If you want to know about the safety of any other medications not listed here, please contact your health care provider.      Problem:  Safe to Take:    Pain relief, headache, and fever  Acetaminophen - Tylenol, Anacin Aspirin-Free    Heartburn  Acid neutralizers - Maalox, Mylanta, Rolaids, Tums, Gaviscon   Histamine-blockers - Pepcid, Zantac, Prilosec    Gas pains and bloating  Simethicone - Gas-X, Maalox Anti-Gas, Mylanta Gas, Mylicon    Nausea  Ammy - beverages, tablets, candies   Vitamin B6   Emetrol (if not diabetic)   Sea bands   Anti-histamines - Sleep-mariluz, Benadryl, Bonnine, Dramamine    Cough  Guaifenesin (expectorant) - Hytuss, Mucinex, Robitussin   Dextromethorphan (antitussive) - Benylin, Delsym, Scot-Tussin DM   Guaifenesin plus dextromethorphan - Benylin Expectorant, Robitussin DM    Congestion  Pseudoephedrine - Sudafed, Actifed, Dristan, Neosynephrine   Vicks VapoRub   Saline nasal drops or spray    Sore throat  Throat lozenges - Sucrets, Cepacol, Cepastat, Ricola   Chloroseptic Spray   Warm salt/water gargle    Allergy relief  Chlorpheniramine - Chlor-Trimeton, Triaminic   Loratadine - Alavert, Claritin, Tavist ND, Triaminic Allerchews   Cetirizine - Zyrtec   Diphenhydramine -Benadryl, Diphenhist    Rashes  Hydrocortisone cream or ointment   Caladryl lotion or cream   Benadryl cream   Oatmeal bath (Aveeno)    Diarrhea  Loperamide - Imodium, Kaopectate, Maalox Anti-Diarrheal, Pepto Bismol    Constipation  Fiber supplements - Metamucil, Citrucel, Fiberall/Fibercon, Benefiber   Stool softeners - Colace, Senekot, Dulcolax   Milk of Magnesia    Hemorrhoids  Warm baths   Witch hazel preparations - Tucks medicated pads   Steroid preparations - Anusol-HC, Preparation H    Insomnia  Diphenhydramine - Benadryl, Unisom  SleepGels, Nytol, Sominex   Doxylamine succinate - Unisom Nighttime Sleep-Aid    First-aid ointments  Cortaid, Lanacort, Polysporin, Bacitracin, Neosporin    Yeast infections  Call office for appointment      Connected MOM   Using Wireless Technology to Manage Your Prenatal Health   Congratulations! Your team here at [facility] is excited for the upcoming addition to your family and is ready to support you over the course of your pregnancy. One of the ways we are prepared to help you is through our exciting new Digital Medicine Program, Connected MOM. Connected MOM stands for Connected Maternity Online Monitoring and is offered free of charge as a way to help our expectant mothers manage their pregnancy with fewer visits to the obstetrician.    In the fast-paced environment we live in, patients demand convenient, reliable access to healthcare at their fingertips. Recommended by The American College of Obstetricians and Gynecologists (ACOG), the standard prenatal care model for low-risk pregnancies can average anywhere between 12-14 in-office prenatal visits.    Through this innovative program, participating mothers-to-be receive a wireless scale, wireless blood pressure cuff and an at-home urine protein test kit. Through the use of a smartphone, patients can easily send their weight, blood pressure readings and urine protein test results digitally in real-time from the comfort of their own homes. Results are sent directly to their [facility] account, the systems online patient portal which connects directly to the patients Electronic Medical Record. A specialized Connected MOM care team - comprised of the patients obstetrician, a dedicated health  and a  - reviews the data and provides medical recommendations as needed. This allows expectant mothers to receive care proactively, wherever they are, while minimizing the need for in-person visits and thus minimizing  disruption to their regular daily activities.    How it Works At the initial prenatal visit, interested patients can work with their obstetrician to sign up for the program. After the appointment, expectant mothers can head to the [facility], a first-of-its-kind retail experience created by [facility] offering the latest in cutting-edge, interactive healthcare technology, to receive a Connected MOM kit containing all of the digital tools needed for remote monitoring. Once enrolled, patients weigh themselves regularly and take their blood pressure once a week. Instead of coming to three office appointments at weeks 20, 30, 37 the patient will have the appointment at home. They will take their blood pressure, weight and perform three at-home urine protein tests at these home visits. Results are directly transmitted into the EMR, and notifications and messages from the care team are communicated via [facility].     TECH SUPPORT [phone]  [email or web address]      Chromosomal testing  The sequential screen is a test done around 11-13 weeks that consists of an ultrasound that measures the nuchal fold (neck thickness) of baby and blood work on the same day. You get a second set of labs done a few weeks later after 15 weeks. Based on all three of these results, they are able to tell you if you are considered to be low risk or high risk regarding neural tube defects and chromosomal abnormalities like Down Syndrome. If you are at all interested, I usually place the order today so we do not miss the window. Someone will give you a phone call in a week or two to schedule this. If you do not want it, just tell them no thank you. This test is typically covered by your insurance and is performed by the Maternal Fetal Medicine (MFM) department here at [facility] [place], operated by [facility]. It will not tell you the sex of the baby.     OR     2.  Call [phone] to see about coverage and any out of pocket costs regarding the [id number] (MT21) testing.  This is done any day after 10 weeks and is blood work only. It checks for any chromosomal abnormalities like Down Syndrome. You can also find out the sex of the baby if you choose to know. Once you find out coverage and decide to proceed, send either myself or your doctor a message and we can see what date you can do it. It is done at our second floor lab at [facility], [place].

## 2024-11-19 NOTE — PROGRESS NOTES
HISTORY OF PRESENT ILLNESS:    Ray Ugarte is a 39 y.o. female, ,  Patient's last menstrual period was 2024 (exact date).  for a routine exam complaining of amenorrhea and positive home UPT. IVF audubon. 2 healthy children with same partner. Uncomplicated pregnancies. Having a BOY! +hx of abn pap, no std hx. No NV, pelvic pain. Mild PCB few days ago, A POS. She is a therapist. Asking for PT referral for shoulder pain. This is the extent of the patient's complaints at this time.     Past Medical History:   Diagnosis Date    Abnormal Pap smear of cervix ?    No procedures necessary. Repeat pap normal    Allergy     Eczema        Past Surgical History:   Procedure Laterality Date    BIOPSY,LACRIMAL SAC Left 2024    Procedure: BIOPSY,LACRIMAL SAC;  Surgeon: Delores Kaba MD;  Location: [facility] OR;  Service: Ophthalmology;  Laterality: Left;     SECTION  04/24/2015    x1    DACRYOCYSTORHINOSTOMY, ENDOSCOPIC Left 2024    Procedure: DACRYOCYSTORHINOSTOMY, ENDOSCOPIC;  Surgeon: Delores Kaba MD;  Location: [facility] OR;  Service: Ophthalmology;  Laterality: Left;    INSERTION OF LACRIMAL TUBE Left 2024    Procedure: INSERTION, CABRERA TUBE;  Surgeon: Delores Kaba MD;  Location: [facility] OR;  Service: Ophthalmology;  Laterality: Left;    LASIK      TONSILLECTOMY         MEDICATIONS AND ALLERGIES:      Current Outpatient Medications:     aspirin (ECOTRIN) 81 MG EC tablet, Take 81 mg by mouth once daily., Disp: , Rfl:     diphenhydrAMINE (BENADRYL) 25 mg capsule, Take 25 mg by mouth every 6 (six) hours as needed for Itching., Disp: , Rfl:     FLUZONE QUAD 2438-5500, PF, 60 mcg (15 mcg x 4)/0.5 mL Syrg, , Disp: , Rfl:     omega-3 fatty acids/fish oil (FISH OIL-OMEGA-3 FATTY ACIDS) 300-1,000 mg capsule, Take by mouth once daily., Disp: , Rfl:     prenatal vit no.124/iron/folic (PRENATAL VITAMIN ORAL), Take 1 Dose by mouth Daily., Disp: , Rfl:     SPIKEVAX 3277-2831,12Y UP,,PF, 50  mcg/0.5 mL injection, , Disp: , Rfl:     clobetasoL (TEMOVATE) 0.05 % cream, Apply topically 2 (two) times daily. For hand dermatitis (Patient not taking: Reported on 11/19/2024), Disp: 45 g, Rfl: 0    Review of patient's allergies indicates:   Allergen Reactions    Shellfish containing products Nausea And Vomiting      Only mussels       Family History   Problem Relation Name Age of Onset    No Known Problems Mother      Pancreatic cancer Father  75    Allergies Brother      Breast cancer Neg Hx      Colon cancer Neg Hx      Ovarian cancer Neg Hx         Social History     Socioeconomic History    Marital status:    Tobacco Use    Smoking status: Never     Passive exposure: Never    Smokeless tobacco: Never   Substance and Sexual Activity    Alcohol use: Not Currently     Comment: occasionally    Drug use: No    Sexual activity: Yes     Partners: Male     Birth control/protection: None   Social History Narrative    Works as     Lives with  and daughter     Social Drivers of Health     Financial Resource Strain: Low Risk  (2/6/2024)    Overall Financial Resource Strain (CARDIA)     Difficulty of Paying Living Expenses: Not hard at all   Food Insecurity: No Food Insecurity (2/6/2024)    Hunger Vital Sign     Worried About Running Out of Food in the Last Year: Never true     Ran Out of Food in the Last Year: Never true   Transportation Needs: No Transportation Needs (2/6/2024)    PRAPARE - Transportation     Lack of Transportation (Medical): No     Lack of Transportation (Non-Medical): No   Physical Activity: Sufficiently Active (2/6/2024)    Exercise Vital Sign     Days of Exercise per Week: 7 days     Minutes of Exercise per Session: 30 min   Stress: Stress Concern Present (2/6/2024)    [employer] [employer] - Occupational Stress Questionnaire     Feeling of Stress : Very much   Housing Stability: Low Risk  (2/6/2024)    Housing Stability Vital Sign     Unable to Pay for  "Housing in the Last Year: No     Number of Places Lived in the Last Year: 1     Unstable Housing in the Last Year: No       COMPREHENSIVE GYN HISTORY:  PAP History: + abnormal Paps.  Infection History: Denies STDs. Denies PID.  Benign History: Denies uterine fibroids. Denies ovarian cysts. Denies endometriosis. Denies other conditions.  Cancer History: Denies cervical cancer. Denies uterine cancer or hyperplasia. Denies ovarian cancer. Denies vulvar cancer or pre-cancer. Denies vaginal cancer or pre-cancer. Denies breast cancer. Denies colon cancer.  Sexual Activity History: Reports currently being sexually active  Menstrual History: None.  Contraception: None    ROS:  GENERAL: No weight changes. No swelling. No fatigue. No fever.  CARDIOVASCULAR: No chest pain. No shortness of breath. No leg cramps.   NEUROLOGICAL: No headaches. No vision changes.  BREASTS: No pain. No lumps. No discharge.  ABDOMEN: No pain. No nausea. No vomiting. No diarrhea. No constipation.  REPRODUCTIVE: + abnormal bleeding. No pelvic pain  VULVA: No pain. No lesions. No itching.  VAGINA: No relaxation. No itching. No odor. No discharge. No lesions.  URINARY: No incontinence. No nocturia. No frequency. No dysuria.    BP 96/70 (Patient Position: Sitting)   Ht 5' 6" (1.676 m)   Wt 72.5 kg (159 lb 13.3 oz)   LMP 09/05/2024 (Exact Date)   BMI 25.80 kg/m²     PE:  Physical Exam:   Constitutional: She appears well-developed and well-nourished. She does not appear ill. No distress.        Pulmonary/Chest: Right breast exhibits no inverted nipple, no mass, no nipple discharge, no skin change, no tenderness and no swelling. Left breast exhibits no inverted nipple, no mass, no nipple discharge, no skin change, no tenderness and no swelling.          Genitourinary:    Urethra, vagina, right adnexa and left adnexa normal.      Pelvic exam was performed with patient in the lithotomy position.   The external female genitalia was normal.   Genitalia hair " distrobution normal .   There is no rash or lesion on the right labia. There is no rash or lesion on the left labia. Cervix is normal. No rectocele, cystocele or prolapse of vaginal walls in the vagina.    pap smear not completedUterus is enlarged. Uterus is not tender. Normal urethral meatus.              Neurological: GCS eye subscore is 4. GCS verbal subscore is 5. GCS motor subscore is 6.         PROCEDURES:  UPT Positive  Genprobe  Pap-  NL      DIAGNOSIS:  Gyn exam  IUP with stated LMP of Patient's last menstrual period was 2024 (exact date).  Indication   ========   Indication: IVF Pregnancy     History   ======   Previous Outcomes    3   Para 2   Risk Factors   History risk factors: Advanced maternal age   History risk factors: IVF pregnancy     Method   ======   Transabdominal ultrasound examination, [place] S8. View: Good view     Pregnancy   =========   Marie pregnancy. Number of fetuses: 1     Dating   ======   Conception: IVF   Embryo transfer on: 2024   IVF / ET 5 d   GA by IVF / ET 10 w + 5 d   DIANE by IVF / ET: 2025   Ultrasound examination on: 2024   GA by U/S based upon: CRL   GA by U/S 11 w + 2 d   DIANE by U/S: 2025   Assigned: based on the IVF / ET date, selected on 2024   Assigned GA 10 w + 5 d   Assigned DIAEN: 2025     Assessment   ==========   Gestational sac: visualized   Location: intrauterine   Yolk sac: visualized   Amniotic sac: visualized   Embryo: visualized   CRL 44.7 mm 11w 2d [place]   Cardiac activity: present    bpm     Maternal Structures   ===============   Uterus / Cervix   Uterus: Normal   Cervix: Visualized   Approach: Transvaginal   Ovaries / Tubes / Adnexa   Rt ovary: Normal   Rt ovary D1 40 mm   Rt ovary D2 32 mm   Rt ovary D3 15 mm   Rt ovary Vol 10.1 cmï¿½   Lt ovary: Normal   Lt ovary D1 27 mm   Lt ovary D2 32 mm   Lt ovary D3 13 mm   Lt ovary Vol 5.9 cmï¿½   Cul de Sac / Bladder / Kidneys / Other   Free fluid: No  free fluid visualized     Impression   =========   A delarosa IUP with cardiac activity is present. Fetal size is consistent with established dating.   The maternal adnexae are normal in appearance.     PLAN:Routine prenatal care    MEDICATIONS PRESCRIBED:  PNV    LABS AND TESTS ORDERED:  New Ob Labs      1st TRIMESTER COUNSELING: Discussed all, booklet provided  Common complaints of pregnancy  HIV and other routine prenatal tests including  genetic screening  Risk factors identified by prenatal history  Anticipated course of prenatal care  Nutrition and weight gain counseling  Toxoplasmosis precautions (Cats/Raw Meat)  Sexual activity and exercise  Environmental/Work hazards  Travel  Tobacco (Ask, Advise, Assess, Assist, and Arrange), as well as alcohol and drug use  Use of any medications (Including supplements, Vitamins, Herbs, or OTC Drugs)  Indications for Ultrasound  Domestic violence  Seat belt use  Childbirth classes/Hospital facilities     TERATOLOGY COUNSELING: Discussed options for SS, MT21 - neg carrier screening in chart. Pt will let us know her desires. Discussed time constraints on SS      FOLLOW-UP for a New Ob Visit in   4   weeks with   -discussed to call clinic or L&D/ER if after hours for pain/bleeding  -baseline preE labs for AMA. Connected mom ordered

## 2024-11-20 LAB
BACTERIA UR CULT: NORMAL
BACTERIA UR CULT: NORMAL

## 2024-11-22 LAB
C TRACH DNA SPEC QL NAA+PROBE: NOT DETECTED
N GONORRHOEA DNA SPEC QL NAA+PROBE: NOT DETECTED

## 2024-11-22 RX ORDER — HYDROXYZINE HYDROCHLORIDE 25 MG/1
25 TABLET, FILM COATED ORAL NIGHTLY
Qty: 90 TABLET | Refills: 0 | Status: SHIPPED | OUTPATIENT
Start: 2024-11-22

## 2024-12-09 ENCOUNTER — OCHSNER VIRTUAL EMERGENCY DEPARTMENT (OUTPATIENT)
Facility: CLINIC | Age: 39
End: 2024-12-09
Payer: COMMERCIAL

## 2024-12-09 ENCOUNTER — NURSE TRIAGE (OUTPATIENT)
Dept: ADMINISTRATIVE | Facility: CLINIC | Age: 39
End: 2024-12-09
Payer: COMMERCIAL

## 2024-12-09 ENCOUNTER — OFFICE VISIT (OUTPATIENT)
Dept: FAMILY MEDICINE | Facility: CLINIC | Age: 39
End: 2024-12-09
Payer: COMMERCIAL

## 2024-12-09 VITALS
OXYGEN SATURATION: 100 % | HEIGHT: 66 IN | DIASTOLIC BLOOD PRESSURE: 76 MMHG | SYSTOLIC BLOOD PRESSURE: 115 MMHG | WEIGHT: 153.38 LBS | HEART RATE: 101 BPM | BODY MASS INDEX: 24.65 KG/M2

## 2024-12-09 DIAGNOSIS — J06.9 VIRAL UPPER RESPIRATORY TRACT INFECTION WITH COUGH: Primary | ICD-10-CM

## 2024-12-09 DIAGNOSIS — R06.2 WHEEZING: ICD-10-CM

## 2024-12-09 LAB
CTP QC/QA: YES
POC MOLECULAR INFLUENZA A AGN: NEGATIVE
POC MOLECULAR INFLUENZA B AGN: POSITIVE
SARS-COV-2 RNA RESP QL NAA+PROBE: NOT DETECTED

## 2024-12-09 PROCEDURE — 99999 PR PBB SHADOW E&M-EST. PATIENT-LVL IV: CPT | Mod: PBBFAC,,, | Performed by: NURSE PRACTITIONER

## 2024-12-09 PROCEDURE — 3074F SYST BP LT 130 MM HG: CPT | Mod: CPTII,S$GLB,, | Performed by: NURSE PRACTITIONER

## 2024-12-09 PROCEDURE — 3078F DIAST BP <80 MM HG: CPT | Mod: CPTII,S$GLB,, | Performed by: NURSE PRACTITIONER

## 2024-12-09 PROCEDURE — 3008F BODY MASS INDEX DOCD: CPT | Mod: CPTII,S$GLB,, | Performed by: NURSE PRACTITIONER

## 2024-12-09 PROCEDURE — 99213 OFFICE O/P EST LOW 20 MIN: CPT | Mod: S$GLB,,, | Performed by: NURSE PRACTITIONER

## 2024-12-09 PROCEDURE — 1160F RVW MEDS BY RX/DR IN RCRD: CPT | Mod: CPTII,S$GLB,, | Performed by: NURSE PRACTITIONER

## 2024-12-09 PROCEDURE — 87502 INFLUENZA DNA AMP PROBE: CPT | Mod: QW,S$GLB,, | Performed by: NURSE PRACTITIONER

## 2024-12-09 PROCEDURE — 1159F MED LIST DOCD IN RCRD: CPT | Mod: CPTII,S$GLB,, | Performed by: NURSE PRACTITIONER

## 2024-12-09 PROCEDURE — 87635 SARS-COV-2 COVID-19 AMP PRB: CPT | Performed by: NURSE PRACTITIONER

## 2024-12-09 RX ORDER — AZITHROMYCIN 250 MG/1
TABLET, FILM COATED ORAL
Qty: 6 TABLET | Refills: 0 | Status: SHIPPED | OUTPATIENT
Start: 2024-12-09 | End: 2024-12-09 | Stop reason: CLARIF

## 2024-12-09 RX ORDER — BENZONATATE 100 MG/1
100 CAPSULE ORAL 3 TIMES DAILY PRN
Qty: 30 CAPSULE | Refills: 1 | Status: SHIPPED | OUTPATIENT
Start: 2024-12-09

## 2024-12-09 NOTE — PLAN OF CARE-OVED
[facility] Emergency Department Plan of Care Note    Referral source: Nurse On-Call      Reason for consult: Cough      Additional History: per nurse on call, Speaking with pt who is 13 weeks pregnant. States that she has been having cold symptoms such as productive cough, nasal congestion for a week. Has not had pregnancy confirmation appointment. Does report mild SOB with walking.       Disposition recommended: Primary Care

## 2024-12-09 NOTE — PROGRESS NOTES
Subjective:       Patient ID: Ray Ugarte is a 39 y.o. female.    Chief Complaint: Cough  Ray Ugarte presents today for Evaluation & management of cough. Last seen in 8/22/2024 by PCP, GIOVANNA Mccoy MD.     Cough  This is a new problem. The current episode started in the past 7 days. The problem has been gradually worsening. The problem occurs every few minutes. Associated symptoms include a fever, myalgias, nasal congestion, shortness of breath and wheezing. Pertinent negatives include no chest pain, chills, ear pain, headaches, rash or sore throat. Nothing aggravates the symptoms. She has tried rest for the symptoms. The treatment provided no relief. There is no history of asthma, COPD or pneumonia.     Patient Active Problem List   Diagnosis    Instability of pelvis or thigh joint    Weakness of both hips    Acetabular labrum tear    Amenorrhea       Current Outpatient Medications:     aspirin (ECOTRIN) 81 MG EC tablet, Take 81 mg by mouth once daily., Disp: , Rfl:     clobetasoL (TEMOVATE) 0.05 % cream, Apply topically 2 (two) times daily. For hand dermatitis, Disp: 45 g, Rfl: 0    diphenhydrAMINE (BENADRYL) 25 mg capsule, Take 25 mg by mouth every 6 (six) hours as needed for Itching., Disp: , Rfl:     omega-3 fatty acids/fish oil (FISH OIL-OMEGA-3 FATTY ACIDS) 300-1,000 mg capsule, Take by mouth once daily., Disp: , Rfl:     prenatal vit no.124/iron/folic (PRENATAL VITAMIN ORAL), Take 1 Dose by mouth Daily., Disp: , Rfl:     albuterol sulfate (PROAIR RESPICLICK) 90 mcg/actuation inhaler, Inhale 1-2 puffs into the lungs every 6 (six) hours as needed for Wheezing or Shortness of Breath. Rescue, Disp: 1 each, Rfl: 3    benzonatate (TESSALON) 100 MG capsule, Take 1 capsule (100 mg total) by mouth 3 (three) times daily as needed for Cough., Disp: 30 capsule, Rfl: 1    FLUZONE QUAD 9932-2946, PF, 60 mcg (15 mcg x 4)/0.5 mL Syrg, , Disp: , Rfl:     hydrOXYzine HCL (ATARAX) 25 MG tablet, TAKE 1 TABLET  "BY MOUTH EVERY EVENING (Patient not taking: Reported on 12/9/2024), Disp: 90 tablet, Rfl: 0    SPIKEVAX 8326-6241,12Y UP,,PF, 50 mcg/0.5 mL injection, , Disp: , Rfl:     The following portions of the patient's history were reviewed and updated as appropriate: allergies, past family history, past medical history, past social history and past surgical history.    Review of Systems   Constitutional:  Positive for fever. Negative for chills.   HENT:  Negative for ear pain, hearing loss, sore throat and trouble swallowing.    Respiratory:  Positive for cough, shortness of breath and wheezing.    Cardiovascular:  Negative for chest pain.   Musculoskeletal:  Positive for myalgias.   Skin:  Negative for rash.   Neurological:  Negative for headaches.       Objective:      /76 (BP Location: Left arm, Patient Position: Sitting)   Pulse 101   Ht 5' 6" (1.676 m)   Wt 69.6 kg (153 lb 6.4 oz)   LMP 09/05/2024 (Exact Date)   SpO2 100%   BMI 24.76 kg/m²     Physical Exam  Constitutional:       General: She is not in acute distress.     Appearance: Normal appearance. She is ill-appearing.   HENT:      Right Ear: A middle ear effusion is present.      Left Ear: A middle ear effusion is present.      Mouth/Throat:      Pharynx: Oropharynx is clear. Posterior oropharyngeal erythema present. No pharyngeal swelling.   Cardiovascular:      Rate and Rhythm: Regular rhythm.      Pulses: Normal pulses.      Heart sounds: Normal heart sounds.   Pulmonary:      Effort: Pulmonary effort is normal.      Breath sounds: Normal breath sounds. No decreased breath sounds, wheezing or rhonchi.   Musculoskeletal:         General: Normal range of motion.   Skin:     General: Skin is warm and dry.   Neurological:      Mental Status: She is alert and oriented to person, place, and time.   Psychiatric:         Mood and Affect: Mood normal.         Behavior: Behavior normal.         Assessment:       1. Viral upper respiratory tract infection " with cough    2. Wheezing        Plan:   Ray was seen today for cough.    Diagnoses and all orders for this visit:    Viral upper respiratory tract infection with cough  -     COVID-19 Routine Screening  -     POCT Influenza A/B Molecular  -     benzonatate (TESSALON) 100 MG capsule; Take 1 capsule (100 mg total) by mouth 3 (three) times daily as needed for Cough.    Wheezing  -     albuterol sulfate (PROAIR RESPICLICK) 90 mcg/actuation inhaler; Inhale 1-2 puffs into the lungs every 6 (six) hours as needed for Wheezing or Shortness of Breath. Rescue    Other orders  -     Discontinue: azithromycin (Z-KATHIE) 250 MG tablet; Take 2 tablets by mouth on day 1; Take 1 tablet by mouth on days 2-5      Flu B positive; Case reviewed with MICHELINE Portillo. Recommendations as above. May take tylenol every 6 hours as needed.    F/U by phone/email 48 hours.

## 2024-12-09 NOTE — TELEPHONE ENCOUNTER
Speaking with pt who is 13 weeks pregnant. States that she has been having cold symptoms such as productive cough, nasal congestion for a week. Has not had pregnancy confirmation appointment. Does report mild SOB with walking. Dispo to be seen within 4 hours.     8:03 secure chat message     Dr. Jama states ok to be seen by PCP    Appointment scheduled, pt aware.      Reason for Disposition   [1] MILD difficulty breathing (e.g., minimal/no SOB at rest, SOB with walking, pulse <100) AND [2] still present when not coughing    Additional Information   Negative: SEVERE difficulty breathing (e.g., struggling for each breath, speaks in single words)   Negative: Bluish (or gray) lips or face now   Negative: [1] Difficulty breathing AND [2] exposure to flames, smoke, or fumes   Negative: [1] Stridor AND [2] difficulty breathing   Negative: Sounds like a life-threatening emergency to the triager   Negative: [1] MODERATE difficulty breathing (e.g., speaks in phrases, SOB even at rest, pulse 100-120) AND [2] still present when not coughing   Negative: Chest pain  (Exception: MILD central chest pain, present only when coughing.)   Negative: Patient sounds very sick or weak to the triager    Protocols used: Cough - Acute [id number]

## 2024-12-16 ENCOUNTER — PATIENT MESSAGE (OUTPATIENT)
Dept: OBSTETRICS AND GYNECOLOGY | Facility: CLINIC | Age: 39
End: 2024-12-16
Payer: COMMERCIAL

## 2024-12-16 DIAGNOSIS — R50.9 FEVER, UNSPECIFIED FEVER CAUSE: Primary | ICD-10-CM

## 2024-12-16 DIAGNOSIS — Z34.92 PREGNANCY WITH ONE FETUS IN SECOND TRIMESTER: ICD-10-CM

## 2024-12-16 DIAGNOSIS — J11.1 FLU: ICD-10-CM

## 2024-12-18 ENCOUNTER — TELEPHONE (OUTPATIENT)
Dept: OBSTETRICS AND GYNECOLOGY | Facility: CLINIC | Age: 39
End: 2024-12-18
Payer: COMMERCIAL

## 2024-12-18 NOTE — TELEPHONE ENCOUNTER
Spoke with pt about AFP lab, informed her that we will need same-day weight. Pt plans to come Friday 12/20 for weight and lab.

## 2024-12-20 ENCOUNTER — LAB VISIT (OUTPATIENT)
Dept: LAB | Facility: OTHER | Age: 39
End: 2024-12-20
Attending: OBSTETRICS & GYNECOLOGY
Payer: COMMERCIAL

## 2024-12-20 ENCOUNTER — TELEPHONE (OUTPATIENT)
Dept: OBSTETRICS AND GYNECOLOGY | Facility: CLINIC | Age: 39
End: 2024-12-20
Payer: COMMERCIAL

## 2024-12-20 DIAGNOSIS — Z34.92 PREGNANCY WITH ONE FETUS IN SECOND TRIMESTER: ICD-10-CM

## 2024-12-20 DIAGNOSIS — Z34.92 PREGNANCY WITH ONE FETUS IN SECOND TRIMESTER: Primary | ICD-10-CM

## 2024-12-20 PROCEDURE — 36415 COLL VENOUS BLD VENIPUNCTURE: CPT | Performed by: OBSTETRICS & GYNECOLOGY

## 2024-12-20 PROCEDURE — 82105 ALPHA-FETOPROTEIN SERUM: CPT | Performed by: OBSTETRICS & GYNECOLOGY

## 2024-12-23 ENCOUNTER — INITIAL PRENATAL (OUTPATIENT)
Dept: OBSTETRICS AND GYNECOLOGY | Facility: CLINIC | Age: 39
End: 2024-12-23
Attending: OBSTETRICS & GYNECOLOGY
Payer: COMMERCIAL

## 2024-12-23 VITALS — DIASTOLIC BLOOD PRESSURE: 70 MMHG | BODY MASS INDEX: 25.99 KG/M2 | WEIGHT: 161 LBS | SYSTOLIC BLOOD PRESSURE: 100 MMHG

## 2024-12-23 DIAGNOSIS — O09.819 SUPRVSN OF PREG RSLT FROM ASSISTED REPRODCTV TECH, UNSP TRI: Primary | ICD-10-CM

## 2024-12-23 DIAGNOSIS — J11.1 INFLUENZA: ICD-10-CM

## 2024-12-23 LAB
# FETUSES US: NORMAL
AFP INTERPRETATION: NORMAL
AFP MOM SERPL: 1.08
AFP SERPL-MCNC: 29.4 NG/ML
AFP SERPL-MCNC: NEGATIVE NG/ML
AGE AT DELIVERY: 39
GA (DAYS): 1 D
GA (WEEKS): 15 WK
GESTATIONAL AGE METHOD: NORMAL
IDDM PATIENT QL: NORMAL
SMOKING STATUS FTND: NO

## 2024-12-23 PROCEDURE — 0502F SUBSEQUENT PRENATAL CARE: CPT | Mod: CPTII,S$GLB,, | Performed by: OBSTETRICS & GYNECOLOGY

## 2024-12-23 PROCEDURE — 99999 PR PBB SHADOW E&M-EST. PATIENT-LVL III: CPT | Mod: PBBFAC,,, | Performed by: OBSTETRICS & GYNECOLOGY

## 2024-12-23 NOTE — PROGRESS NOTES
Feeling better after recent flu, minimal residual symptoms.  AFP drawn 3 days ago due to concerns over fever, MFM consult scheduled in January.  Discussed expectations for AFP result timing, false positives, etc...  Normal FHT today.  Bedside sono reassuring.  Precautions reviewed.    F/U 4 weeks or PRN

## 2024-12-26 ENCOUNTER — PATIENT MESSAGE (OUTPATIENT)
Dept: OTHER | Facility: OTHER | Age: 39
End: 2024-12-26
Payer: COMMERCIAL

## 2025-01-02 ENCOUNTER — PATIENT MESSAGE (OUTPATIENT)
Dept: OTHER | Facility: OTHER | Age: 40
End: 2025-01-02
Payer: COMMERCIAL

## 2025-01-08 NOTE — PROGRESS NOTES
MATERNAL-FETAL MEDICINE   CONSULT NOTE    Provider requesting consultation: Josie Khan DO    SUBJECTIVE:     Ms. Ray Ugarte is a 39 y.o.  female with IUP at 18w1d who is seen in consultation by MFM for evaluation and management of:  Problem   History of Fever   [place] (Advanced Maternal Age) Multigravida 35+, Second Trimester   Low-Lying Placenta in Second Trimester   Pregnancy Resulting From in Vitro Fertilization in Second Trimester     Patient states she is feeling well. Denies abdominal pain or vaginal bleeding. She has no complaints or concerns today.       Medication List with Changes/Refills   Current Medications    ALBUTEROL SULFATE (PROAIR RESPICLICK) 90 MCG/ACTUATION INHALER    Inhale 1-2 puffs into the lungs every 6 (six) hours as needed for Wheezing or Shortness of Breath. Rescue    ASPIRIN (ECOTRIN) 81 MG EC TABLET    Take 81 mg by mouth once daily.    BENZONATATE (TESSALON) 100 MG CAPSULE    Take 1 capsule (100 mg total) by mouth 3 (three) times daily as needed for Cough.    CLOBETASOL (TEMOVATE) 0.05 % CREAM    Apply topically 2 (two) times daily. For hand dermatitis    DIPHENHYDRAMINE (BENADRYL) 25 MG CAPSULE    Take 25 mg by mouth every 6 (six) hours as needed for Itching.    FLUZONE QUAD 7800-3099, PF, 60 MCG (15 MCG X 4)/0.5 ML SYRG        HYDROXYZINE HCL (ATARAX) 25 MG TABLET    TAKE 1 TABLET BY MOUTH EVERY EVENING    OMEGA-3 FATTY ACIDS/FISH OIL (FISH OIL-OMEGA-3 FATTY ACIDS) 300-1,000 MG CAPSULE    Take by mouth once daily.    PRENATAL VIT NO.124/IRON/FOLIC (PRENATAL VITAMIN ORAL)    Take 1 Dose by mouth Daily.    SPIKEVAX 3460-6165,12Y UP,,PF, 50 MCG/0.5 ML INJECTION           Review of patient's allergies indicates:   Allergen Reactions    Shellfish containing products Nausea And Vomiting      Only mussels       PMH:  Past Medical History:   Diagnosis Date    Abnormal Pap smear of cervix ?    No procedures necessary. Repeat pap normal    Allergy     Eczema        PObHx:  OB  "History    Para Term  AB Living   4 2 2   1 2   SAB IAB Ectopic Multiple Live Births   1     0 2      # Outcome Date GA Lbr Bubba/2nd Weight Sex Type Anes PTL Lv   4 Current            3 Term 20 39w6d  3.14 kg (6 lb 14.8 oz) F  EPI N JCARLOS   2 2018     SAB      1 Term 04/24/15 41w1d  4.054 kg (8 lb 15 oz) F CS-LTranv EPI N JCARLOS       PSH:  Past Surgical History:   Procedure Laterality Date    BIOPSY,LACRIMAL SAC Left 2024    Procedure: BIOPSY,LACRIMAL SAC;  Surgeon: Delores Kaba MD;  Location: [facility] OR;  Service: Ophthalmology;  Laterality: Left;     SECTION  04/24/2015    x1    DACRYOCYSTORHINOSTOMY, ENDOSCOPIC Left 2024    Procedure: DACRYOCYSTORHINOSTOMY, ENDOSCOPIC;  Surgeon: Delores Kaba MD;  Location: [facility] OR;  Service: Ophthalmology;  Laterality: Left;    INSERTION OF LACRIMAL TUBE Left 2024    Procedure: INSERTION, CABRERA TUBE;  Surgeon: Delores Kaba MD;  Location: [facility] OR;  Service: Ophthalmology;  Laterality: Left;    LASIK      TONSILLECTOMY         Family history:family history includes Allergies in her brother; No Known Problems in her mother; Pancreatic cancer (age of onset: 75) in her father.    Social history: reports that she has never smoked. She has never been exposed to tobacco smoke. She has never used smokeless tobacco. She reports that she does not currently use alcohol. She reports that she does not use drugs.    Genetic history:  The patient denies any inherited genetic diseases or birth defects in herself or her partner's personal history or family.    Objective:   /73 (BP Location: Left arm, Patient Position: Sitting)   Ht 5' 6" (1.676 m)   Wt 78.9 kg (174 lb 0.9 oz)   LMP 2024 (Exact Date)   BMI 28.09 kg/m²     Ultrasound performed. See viewpoint for full ultrasound report.    ASSESSMENT/PLAN:     39 y.o.  female with IUP at 18w1d     Pregnancy resulting from in vitro fertilization in second trimester  This " pregnancy conceived through IVF, PGT negative.    We discussed that the use of IVF has been associated with an increase in preeclampsia, gestational hypertension, placental abruption, placenta previa, and risk of  delivery. Due to the potential increased risk of congenital malformations following IVF, a targeted anatomic ultrasound is recommended at 18-20 weeks. In addition, a fetal echocardiogram may be considered if the targeted images are incomplete given the 2-fold associated risk of congenital cardiac anomalies.  While the association between ART and these adverse outcomes raise some concern, it is important to note that the literature is contradictory on this subject and the chances of having a healthy child conceived with ART are overall extremely high.    Recommendations:  Targeted ultrasound for anatomical survey at 18-20 weeks (Incomplete but reassuring today)  Consider fetal echocardiogram at 22 weeks if cardiac views are suboptimal on follow up detailed ultrasound.  Continue low dose aspirin 81 mg daily for preeclampsia risk reduction  Close monitoring for preeclampsia.  Fetal growth ultrasound at 32-34 weeks  Start weekly antepartum testing at 36 weeks' gestation  Consider delivery between 39 0/7 - 39 6/7 weeks' gestation    History of fever  Patient reports high fevers when she had the flu around 13-14 weeks gestation.    We discussed that febrile illness in the first trimester may be associated with an increased risk of congenital anomalies, especially NTDs, but also possibly congenital heart disease and oral clefts. I reassured the patient that her fevers occurred well after closure of the fetal neural tube (which occurs 21-28 days after conception), and after formation of fetal organs. Therefore, a congential anomaly because of a high fever is extremely unlikely in this case. Additionally, she had a normal AFP level and reassuring (although incomplete) anatomy ultrasound  today.    Recommendations:  Routine prenatal care, see other problems for additional recommendations     AMA (advanced maternal age) multigravida 35+, second trimester  IVF pregnancy, PGT negative  NIPT negative    Today I counseled the patient on the relationship between maternal age and fetal aneuploidy, as well as the association between advanced maternal age (AMA) and preeclampsia, gestational diabetes, and fetal growth restriction. Her questions were answered and after today's consultation she did not want to pursue amniocentesis.     Recommendations:  See IVF header    Low-lying placenta in second trimester  Low-lying placenta noted on today's scan.   Counseled patient on findings.  Plan to reexamine with transvaginal ultrasound on follow up scan.  Further recommendations will be made if placenta remains low-lying.    FOLLOW UP  Patient scheduled for follow up anatomy scan in 4-6 weeks (no MD visit at that time).  No additional follow up has been scheduled with M. Please do not hesitate to contact us with any additional questions, and thank you for allowing us to participate in the care of this patient.    30 minutes of total time spent on the encounter, which includes face to face time and non-face to face time preparing to see the patient (eg, review of tests), obtaining and/or reviewing separately obtained history, documenting clinical information in the electronic or other health record, independently interpreting results (not separately reported) and communicating results to the patient/family/caregiver, or care coordination (not separately reported).    REECE Bay MD   Maternal-Fetal Medicine      Electronically Signed by Anna Bay January 10, 2025

## 2025-01-10 ENCOUNTER — OFFICE VISIT (OUTPATIENT)
Dept: MATERNAL FETAL MEDICINE | Facility: CLINIC | Age: 40
End: 2025-01-10
Attending: OBSTETRICS & GYNECOLOGY
Payer: COMMERCIAL

## 2025-01-10 ENCOUNTER — PROCEDURE VISIT (OUTPATIENT)
Dept: MATERNAL FETAL MEDICINE | Facility: CLINIC | Age: 40
End: 2025-01-10
Payer: COMMERCIAL

## 2025-01-10 VITALS
HEIGHT: 66 IN | DIASTOLIC BLOOD PRESSURE: 73 MMHG | WEIGHT: 174.06 LBS | SYSTOLIC BLOOD PRESSURE: 116 MMHG | BODY MASS INDEX: 27.97 KG/M2

## 2025-01-10 DIAGNOSIS — Z87.898 HISTORY OF FEVER: ICD-10-CM

## 2025-01-10 DIAGNOSIS — O09.522 AMA (ADVANCED MATERNAL AGE) MULTIGRAVIDA 35+, SECOND TRIMESTER: ICD-10-CM

## 2025-01-10 DIAGNOSIS — R50.9 FEVER, UNSPECIFIED FEVER CAUSE: ICD-10-CM

## 2025-01-10 DIAGNOSIS — O09.812 PREGNANCY RESULTING FROM IN VITRO FERTILIZATION IN SECOND TRIMESTER: ICD-10-CM

## 2025-01-10 DIAGNOSIS — O44.42 LOW-LYING PLACENTA IN SECOND TRIMESTER: ICD-10-CM

## 2025-01-10 DIAGNOSIS — Z36.2 ENCOUNTER FOR FOLLOW-UP ULTRASOUND OF FETAL ANATOMY: Primary | ICD-10-CM

## 2025-01-10 DIAGNOSIS — Z34.92 PREGNANCY WITH ONE FETUS IN SECOND TRIMESTER: ICD-10-CM

## 2025-01-10 DIAGNOSIS — J11.1 FLU: ICD-10-CM

## 2025-01-10 PROCEDURE — 99999 PR PBB SHADOW E&M-EST. PATIENT-LVL III: CPT | Mod: PBBFAC,,, | Performed by: STUDENT IN AN ORGANIZED HEALTH CARE EDUCATION/TRAINING PROGRAM

## 2025-01-10 PROCEDURE — 76811 OB US DETAILED SNGL FETUS: CPT | Mod: S$GLB,,, | Performed by: STUDENT IN AN ORGANIZED HEALTH CARE EDUCATION/TRAINING PROGRAM

## 2025-01-10 NOTE — ASSESSMENT & PLAN NOTE
This pregnancy conceived through IVF, PGT negative.    We discussed that the use of IVF has been associated with an increase in preeclampsia, gestational hypertension, placental abruption, placenta previa, and risk of  delivery. Due to the potential increased risk of congenital malformations following IVF, a targeted anatomic ultrasound is recommended at 18-20 weeks. In addition, a fetal echocardiogram may be considered if the targeted images are incomplete given the 2-fold associated risk of congenital cardiac anomalies.  While the association between ART and these adverse outcomes raise some concern, it is important to note that the literature is contradictory on this subject and the chances of having a healthy child conceived with ART are overall extremely high.    Recommendations:  Targeted ultrasound for anatomical survey at 18-20 weeks (Incomplete but reassuring today)  Consider fetal echocardiogram at 22 weeks if cardiac views are suboptimal on follow up detailed ultrasound.  Continue low dose aspirin 81 mg daily for preeclampsia risk reduction  Close monitoring for preeclampsia.  Fetal growth ultrasound at 32-34 weeks  Start weekly antepartum testing at 36 weeks' gestation  Consider delivery between 39 0/7 - 39 6/7 weeks' gestation

## 2025-01-10 NOTE — ASSESSMENT & PLAN NOTE
Patient reports high fevers when she had the flu around 13-14 weeks gestation.    We discussed that febrile illness in the first trimester may be associated with an increased risk of congenital anomalies, especially NTDs, but also possibly congenital heart disease and oral clefts. I reassured the patient that her fevers occurred well after closure of the fetal neural tube (which occurs 21-28 days after conception), and after formation of fetal organs. Therefore, a congential anomaly because of a high fever is extremely unlikely in this case. Additionally, she had a normal AFP level and reassuring (although incomplete) anatomy ultrasound today.    Recommendations:  Routine prenatal care, see other problems for additional recommendations

## 2025-01-10 NOTE — ASSESSMENT & PLAN NOTE
IVF pregnancy, PGT negative  NIPT negative    Today I counseled the patient on the relationship between maternal age and fetal aneuploidy, as well as the association between advanced maternal age (AMA) and preeclampsia, gestational diabetes, and fetal growth restriction. Her questions were answered and after today's consultation she did not want to pursue amniocentesis.     Recommendations:  See IVF header

## 2025-01-10 NOTE — ASSESSMENT & PLAN NOTE
Low-lying placenta noted on today's scan.   Counseled patient on findings.  Plan to reexamine with transvaginal ultrasound on follow up scan.  Further recommendations will be made if placenta remains low-lying.

## 2025-01-23 ENCOUNTER — PATIENT MESSAGE (OUTPATIENT)
Dept: OTHER | Facility: OTHER | Age: 40
End: 2025-01-23
Payer: COMMERCIAL

## 2025-01-24 ENCOUNTER — ROUTINE PRENATAL (OUTPATIENT)
Dept: OBSTETRICS AND GYNECOLOGY | Facility: CLINIC | Age: 40
End: 2025-01-24
Payer: COMMERCIAL

## 2025-01-24 VITALS — WEIGHT: 170 LBS | SYSTOLIC BLOOD PRESSURE: 128 MMHG | BODY MASS INDEX: 27.44 KG/M2 | DIASTOLIC BLOOD PRESSURE: 71 MMHG

## 2025-01-24 DIAGNOSIS — Z3A.20 20 WEEKS GESTATION OF PREGNANCY: Primary | ICD-10-CM

## 2025-01-24 PROCEDURE — 0502F SUBSEQUENT PRENATAL CARE: CPT | Mod: CPTII,S$GLB,, | Performed by: NURSE PRACTITIONER

## 2025-01-24 PROCEDURE — 99999 PR PBB SHADOW E&M-EST. PATIENT-LVL III: CPT | Mod: PBBFAC,,, | Performed by: NURSE PRACTITIONER

## 2025-01-24 NOTE — PROGRESS NOTES
Here for routine OB appt at 20w1d, with no complaints.  Denies VB and cramping.  Pain, bleeding, and PTL precautions given.  Early anatomy done, fu next month  Having a BOY  Some flutters  RTC @ 24 weeks

## 2025-01-24 NOTE — PATIENT INSTRUCTIONS
[employer]    Tdap or Dtap for close family if not had in the past five years    [facility], Labor and Delivery is on the 6th floor of [facility]: [phone]    [address] PHONE NUMBER, [phone] (OPEN MON-FRI, 8a-5p)    [email or web address]    Blood pressures to look out for:  Top number >140 OR bottom number>90  If elevated, wait 10-15minutes and then repeat  If still elevated, reach out to Doctor.  If top number >160 OR bottom >110, repeat  If still that high, proceed straight to the [facility]

## 2025-02-04 ENCOUNTER — TELEPHONE (OUTPATIENT)
Dept: OBSTETRICS AND GYNECOLOGY | Facility: CLINIC | Age: 40
End: 2025-02-04
Payer: COMMERCIAL

## 2025-02-04 NOTE — LETTER
February 4, 2025    Ray Ugarte  [address]  [facility] [address]              [facility]  [address] [facility] [address] [facility] [address]  [facility] [address]  Phone: [phone]  Fax: [phone]      To Whom It May Concern:    Ms. Ugarte is currently under our care for pregnancy and in the 2nd trimester.  Estimated Date of Delivery: 06/12/2025    Any elective dental work should preferably be scheduled during or after the 2nd trimester or postpartum.    Dental procedures can be performed with local anesthesia without epinephrine. Recommended antibiotics include penicillins, erythromycin, and cephalosporins. Tylenol #3 or Percocet may be used for pain control. No x-rays are allowed for routine screening. For dental problems, up to four x-rays may be taken with proper abdominal shield.    Sincerely,    Mirta Lockwood MA

## 2025-02-04 NOTE — TELEPHONE ENCOUNTER
----- Message from Manuela sent at 2/4/2025  1:04 PM CST -----  Regarding: dental cleaning linda  Name of Who is Calling: Colleen/ Dr. Lakhwinder Fu Dentist office           What is the request in detail: Colleen is requesting a call back to get patient cleared for dental cleaning only.  A voicemail can be left.            Can the clinic reply by [facility]: No           What Number to Call Back if not in [facility]: [phone]

## 2025-02-07 ENCOUNTER — PROCEDURE VISIT (OUTPATIENT)
Dept: MATERNAL FETAL MEDICINE | Facility: CLINIC | Age: 40
End: 2025-02-07
Payer: COMMERCIAL

## 2025-02-07 DIAGNOSIS — Z36.2 ENCOUNTER FOR FOLLOW-UP ULTRASOUND OF FETAL ANATOMY: ICD-10-CM

## 2025-02-07 DIAGNOSIS — Z36.89 ENCOUNTER FOR ULTRASOUND TO ASSESS FETAL GROWTH: Primary | ICD-10-CM

## 2025-02-07 PROCEDURE — 76816 OB US FOLLOW-UP PER FETUS: CPT | Mod: S$GLB,,, | Performed by: OBSTETRICS & GYNECOLOGY

## 2025-02-17 ENCOUNTER — ROUTINE PRENATAL (OUTPATIENT)
Dept: OBSTETRICS AND GYNECOLOGY | Facility: CLINIC | Age: 40
End: 2025-02-17
Attending: OBSTETRICS & GYNECOLOGY
Payer: COMMERCIAL

## 2025-02-17 VITALS
SYSTOLIC BLOOD PRESSURE: 120 MMHG | WEIGHT: 169.88 LBS | BODY MASS INDEX: 27.42 KG/M2 | DIASTOLIC BLOOD PRESSURE: 62 MMHG

## 2025-02-17 DIAGNOSIS — O09.819 SUPRVSN OF PREG RSLT FROM ASSISTED REPRODCTV TECH, UNSP TRI: Primary | ICD-10-CM

## 2025-02-17 NOTE — PROGRESS NOTES
Patient feeling well.  Feeling movement.  Glucose screen with next visit.   Had a friend who recently delivered and baby had hypoxic injury, feeling concerned, discussed.  Plan for ultrasound and future monitoring discussed.  F/U 4 weeks or PRN.  Precautions reviewed.

## 2025-02-20 ENCOUNTER — PATIENT MESSAGE (OUTPATIENT)
Dept: OTHER | Facility: OTHER | Age: 40
End: 2025-02-20
Payer: COMMERCIAL

## 2025-02-25 ENCOUNTER — PATIENT MESSAGE (OUTPATIENT)
Dept: OBSTETRICS AND GYNECOLOGY | Facility: CLINIC | Age: 40
End: 2025-02-25
Payer: COMMERCIAL

## 2025-02-25 DIAGNOSIS — J02.0 STREP THROAT: Primary | ICD-10-CM

## 2025-02-25 RX ORDER — PENICILLIN V POTASSIUM 500 MG/1
500 TABLET, FILM COATED ORAL EVERY 8 HOURS
Qty: 21 TABLET | Refills: 0 | Status: SHIPPED | OUTPATIENT
Start: 2025-02-25 | End: 2025-03-04

## 2025-02-27 ENCOUNTER — PATIENT MESSAGE (OUTPATIENT)
Dept: OBSTETRICS AND GYNECOLOGY | Facility: CLINIC | Age: 40
End: 2025-02-27
Payer: COMMERCIAL

## 2025-02-28 ENCOUNTER — PATIENT MESSAGE (OUTPATIENT)
Dept: MATERNAL FETAL MEDICINE | Facility: CLINIC | Age: 40
End: 2025-02-28
Payer: COMMERCIAL

## 2025-03-06 ENCOUNTER — PATIENT MESSAGE (OUTPATIENT)
Dept: OTHER | Facility: OTHER | Age: 40
End: 2025-03-06
Payer: COMMERCIAL

## 2025-03-17 ENCOUNTER — ROUTINE PRENATAL (OUTPATIENT)
Dept: OBSTETRICS AND GYNECOLOGY | Facility: CLINIC | Age: 40
End: 2025-03-17
Attending: OBSTETRICS & GYNECOLOGY
Payer: COMMERCIAL

## 2025-03-17 ENCOUNTER — LAB VISIT (OUTPATIENT)
Dept: LAB | Facility: HOSPITAL | Age: 40
End: 2025-03-17
Attending: OBSTETRICS & GYNECOLOGY
Payer: COMMERCIAL

## 2025-03-17 VITALS
SYSTOLIC BLOOD PRESSURE: 118 MMHG | BODY MASS INDEX: 28.75 KG/M2 | DIASTOLIC BLOOD PRESSURE: 72 MMHG | WEIGHT: 178.13 LBS

## 2025-03-17 DIAGNOSIS — O26.892 HEARTBURN DURING PREGNANCY IN SECOND TRIMESTER: ICD-10-CM

## 2025-03-17 DIAGNOSIS — R12 HEARTBURN DURING PREGNANCY IN SECOND TRIMESTER: ICD-10-CM

## 2025-03-17 DIAGNOSIS — O09.819 SUPRVSN OF PREG RSLT FROM ASSISTED REPRODCTV TECH, UNSP TRI: Primary | ICD-10-CM

## 2025-03-17 DIAGNOSIS — R09.82 POST-NASAL DRIP: ICD-10-CM

## 2025-03-17 DIAGNOSIS — O09.819 SUPRVSN OF PREG RSLT FROM ASSISTED REPRODCTV TECH, UNSP TRI: ICD-10-CM

## 2025-03-17 LAB
BASOPHILS # BLD AUTO: 0.02 K/UL (ref 0–0.2)
BASOPHILS NFR BLD: 0.2 % (ref 0–1.9)
DIFFERENTIAL METHOD BLD: ABNORMAL
EOSINOPHIL # BLD AUTO: 0.1 K/UL (ref 0–0.5)
EOSINOPHIL NFR BLD: 1 % (ref 0–8)
ERYTHROCYTE [DISTWIDTH] IN BLOOD BY AUTOMATED COUNT: 12.8 % (ref 11.5–14.5)
GLUCOSE SERPL-MCNC: 115 MG/DL (ref 70–140)
HCT VFR BLD AUTO: 35.3 % (ref 37–48.5)
HGB BLD-MCNC: 11.3 G/DL (ref 12–16)
IMM GRANULOCYTES # BLD AUTO: 0.1 K/UL (ref 0–0.04)
IMM GRANULOCYTES NFR BLD AUTO: 0.9 % (ref 0–0.5)
LYMPHOCYTES # BLD AUTO: 2 K/UL (ref 1–4.8)
LYMPHOCYTES NFR BLD: 19.3 % (ref 18–48)
MCH RBC QN AUTO: 30.2 PG (ref 27–31)
MCHC RBC AUTO-ENTMCNC: 32 G/DL (ref 32–36)
MCV RBC AUTO: 94 FL (ref 82–98)
MONOCYTES # BLD AUTO: 0.5 K/UL (ref 0.3–1)
MONOCYTES NFR BLD: 4.6 % (ref 4–15)
NEUTROPHILS # BLD AUTO: 7.8 K/UL (ref 1.8–7.7)
NEUTROPHILS NFR BLD: 74 % (ref 38–73)
NRBC BLD-RTO: 0 /100 WBC
PLATELET # BLD AUTO: 290 K/UL (ref 150–450)
PMV BLD AUTO: 10.5 FL (ref 9.2–12.9)
RBC # BLD AUTO: 3.74 M/UL (ref 4–5.4)
WBC # BLD AUTO: 10.54 K/UL (ref 3.9–12.7)

## 2025-03-17 PROCEDURE — 99999 PR PBB SHADOW E&M-EST. PATIENT-LVL III: CPT | Mod: PBBFAC,,, | Performed by: OBSTETRICS & GYNECOLOGY

## 2025-03-17 PROCEDURE — 85025 COMPLETE CBC W/AUTO DIFF WBC: CPT | Performed by: OBSTETRICS & GYNECOLOGY

## 2025-03-17 PROCEDURE — 82950 GLUCOSE TEST: CPT | Performed by: OBSTETRICS & GYNECOLOGY

## 2025-03-17 PROCEDURE — 0502F SUBSEQUENT PRENATAL CARE: CPT | Mod: CPTII,S$GLB,, | Performed by: OBSTETRICS & GYNECOLOGY

## 2025-03-17 PROCEDURE — 36415 COLL VENOUS BLD VENIPUNCTURE: CPT | Mod: PO | Performed by: OBSTETRICS & GYNECOLOGY

## 2025-03-17 RX ORDER — FAMOTIDINE 40 MG/1
40 TABLET, FILM COATED ORAL NIGHTLY
Qty: 30 TABLET | Refills: 1 | Status: SHIPPED | OUTPATIENT
Start: 2025-03-17 | End: 2026-03-17

## 2025-03-17 RX ORDER — FLUTICASONE PROPIONATE 50 MCG
1 SPRAY, SUSPENSION (ML) NASAL DAILY
Qty: 15 ML | Refills: 0 | Status: SHIPPED | OUTPATIENT
Start: 2025-03-17

## 2025-03-17 NOTE — PROGRESS NOTES
Heartburn- Rx for pepcid given, has been maxing out tums.  Postnasal drip- recent tx for strep, still has throat soreness, left side and some minor lymphadenopathy, no redness of throat- postnasal drip seen, flonase discussed and rx given.  Weight gain- good  Third trimester precautions, reviewed including sleep positions, movement counts, 32w U/S and 36w monitoring.  Tdap next visit.

## 2025-03-18 ENCOUNTER — RESULTS FOLLOW-UP (OUTPATIENT)
Dept: OBSTETRICS AND GYNECOLOGY | Facility: CLINIC | Age: 40
End: 2025-03-18

## 2025-03-20 ENCOUNTER — PATIENT MESSAGE (OUTPATIENT)
Dept: OTHER | Facility: OTHER | Age: 40
End: 2025-03-20
Payer: COMMERCIAL

## 2025-03-31 ENCOUNTER — ROUTINE PRENATAL (OUTPATIENT)
Dept: OBSTETRICS AND GYNECOLOGY | Facility: CLINIC | Age: 40
End: 2025-03-31
Payer: COMMERCIAL

## 2025-03-31 VITALS
DIASTOLIC BLOOD PRESSURE: 64 MMHG | WEIGHT: 175.69 LBS | BODY MASS INDEX: 28.36 KG/M2 | SYSTOLIC BLOOD PRESSURE: 112 MMHG

## 2025-03-31 DIAGNOSIS — Z3A.29 29 WEEKS GESTATION OF PREGNANCY: Primary | ICD-10-CM

## 2025-03-31 PROCEDURE — 99999 PR PBB SHADOW E&M-EST. PATIENT-LVL III: CPT | Mod: PBBFAC,,, | Performed by: NURSE PRACTITIONER

## 2025-03-31 PROCEDURE — 0502F SUBSEQUENT PRENATAL CARE: CPT | Mod: CPTII,S$GLB,, | Performed by: NURSE PRACTITIONER

## 2025-03-31 NOTE — PROGRESS NOTES
Here for routine OB appt at 29w4d, with no complaints.  Reports good FM.  Denies LOF, denies VB, denies contractions.  Reviewed warning signs of Labor and Preeclampsia.  Daily FM counts reinforced.  Tdap today in pharmacy, discussed update for family member if last one was over 5 years.  Wants RSV vaccine- will ask about insurance coverage today in pharmacy. If covered will plan to do next visit in pharmacy.  Questions about measles outbreak and vaccine.   BOY! Two girls at home.   RTC @ 32 weeks  Growth scan 4/25  Infant vertex today  Dr. Chip moraless

## 2025-04-03 ENCOUNTER — PATIENT MESSAGE (OUTPATIENT)
Dept: OTHER | Facility: OTHER | Age: 40
End: 2025-04-03
Payer: COMMERCIAL

## 2025-04-13 DIAGNOSIS — R09.82 POST-NASAL DRIP: ICD-10-CM

## 2025-04-13 NOTE — TELEPHONE ENCOUNTER
Refill Routing Note   Medication(s) are not appropriate for processing by [facility] for the following reason(s):        Outside of protocol-current pregnancy    [facility] action(s):  Route        Medication Therapy Plan: Current Pregnancy      Appointments  past 12m or future 3m with PCP    Date Provider   Last Visit   3/17/2025 Josie Khan, DO   Next Visit   4/28/2025 Josie Khan, DO   ED visits in past 90 days: 0        Note composed:8:22 AM 04/13/2025

## 2025-04-14 ENCOUNTER — ROUTINE PRENATAL (OUTPATIENT)
Dept: OBSTETRICS AND GYNECOLOGY | Facility: CLINIC | Age: 40
End: 2025-04-14
Attending: OBSTETRICS & GYNECOLOGY
Payer: COMMERCIAL

## 2025-04-14 VITALS
WEIGHT: 179.44 LBS | SYSTOLIC BLOOD PRESSURE: 118 MMHG | BODY MASS INDEX: 28.96 KG/M2 | DIASTOLIC BLOOD PRESSURE: 62 MMHG

## 2025-04-14 DIAGNOSIS — Z3A.31 31 WEEKS GESTATION OF PREGNANCY: Primary | ICD-10-CM

## 2025-04-14 PROCEDURE — 0502F SUBSEQUENT PRENATAL CARE: CPT | Mod: CPTII,S$GLB,, | Performed by: NURSE PRACTITIONER

## 2025-04-14 PROCEDURE — 99999 PR PBB SHADOW E&M-EST. PATIENT-LVL III: CPT | Mod: PBBFAC,,, | Performed by: NURSE PRACTITIONER

## 2025-04-17 ENCOUNTER — PATIENT MESSAGE (OUTPATIENT)
Dept: OTHER | Facility: OTHER | Age: 40
End: 2025-04-17
Payer: COMMERCIAL

## 2025-04-23 RX ORDER — FLUTICASONE PROPIONATE 50 MCG
SPRAY, SUSPENSION (ML) NASAL
Qty: 16 ML | Refills: 1 | Status: SHIPPED | OUTPATIENT
Start: 2025-04-23

## 2025-04-25 ENCOUNTER — LAB VISIT (OUTPATIENT)
Dept: LAB | Facility: OTHER | Age: 40
End: 2025-04-25
Attending: NURSE PRACTITIONER
Payer: COMMERCIAL

## 2025-04-25 ENCOUNTER — PROCEDURE VISIT (OUTPATIENT)
Dept: MATERNAL FETAL MEDICINE | Facility: CLINIC | Age: 40
End: 2025-04-25
Attending: OBSTETRICS & GYNECOLOGY
Payer: COMMERCIAL

## 2025-04-25 DIAGNOSIS — Z3A.29 29 WEEKS GESTATION OF PREGNANCY: ICD-10-CM

## 2025-04-25 DIAGNOSIS — Z36.89 ENCOUNTER FOR ULTRASOUND TO ASSESS FETAL GROWTH: ICD-10-CM

## 2025-04-25 LAB
ABSOLUTE EOSINOPHIL (OHS): 0.15 K/UL
ABSOLUTE MONOCYTE (OHS): 0.59 K/UL (ref 0.3–1)
ABSOLUTE NEUTROPHIL COUNT (OHS): 6.36 K/UL (ref 1.8–7.7)
BASOPHILS # BLD AUTO: 0.02 K/UL
BASOPHILS NFR BLD AUTO: 0.2 %
ERYTHROCYTE [DISTWIDTH] IN BLOOD BY AUTOMATED COUNT: 13.2 % (ref 11.5–14.5)
HCT VFR BLD AUTO: 34.3 % (ref 37–48.5)
HGB BLD-MCNC: 11.4 GM/DL (ref 12–16)
HIV 1+2 AB+HIV1 P24 AG SERPL QL IA: NEGATIVE
IMM GRANULOCYTES # BLD AUTO: 0.11 K/UL (ref 0–0.04)
IMM GRANULOCYTES NFR BLD AUTO: 1.1 % (ref 0–0.5)
LYMPHOCYTES # BLD AUTO: 2.35 K/UL (ref 1–4.8)
MCH RBC QN AUTO: 30.3 PG (ref 27–31)
MCHC RBC AUTO-ENTMCNC: 33.2 G/DL (ref 32–36)
MCV RBC AUTO: 91 FL (ref 82–98)
NUCLEATED RBC (/100WBC) (OHS): 0 /100 WBC
PLATELET # BLD AUTO: 210 K/UL (ref 150–450)
PMV BLD AUTO: 10.2 FL (ref 9.2–12.9)
RBC # BLD AUTO: 3.76 M/UL (ref 4–5.4)
RELATIVE EOSINOPHIL (OHS): 1.6 %
RELATIVE LYMPHOCYTE (OHS): 24.5 % (ref 18–48)
RELATIVE MONOCYTE (OHS): 6.2 % (ref 4–15)
RELATIVE NEUTROPHIL (OHS): 66.4 % (ref 38–73)
T PALLIDUM IGG+IGM SER QL: NEGATIVE
WBC # BLD AUTO: 9.58 K/UL (ref 3.9–12.7)

## 2025-04-25 PROCEDURE — 86593 SYPHILIS TEST NON-TREP QUANT: CPT

## 2025-04-25 PROCEDURE — 76817 TRANSVAGINAL US OBSTETRIC: CPT | Mod: S$GLB,,, | Performed by: OBSTETRICS & GYNECOLOGY

## 2025-04-25 PROCEDURE — 85025 COMPLETE CBC W/AUTO DIFF WBC: CPT

## 2025-04-25 PROCEDURE — 36415 COLL VENOUS BLD VENIPUNCTURE: CPT

## 2025-04-25 PROCEDURE — 87389 HIV-1 AG W/HIV-1&-2 AB AG IA: CPT

## 2025-04-25 PROCEDURE — 76816 OB US FOLLOW-UP PER FETUS: CPT | Mod: S$GLB,,, | Performed by: OBSTETRICS & GYNECOLOGY

## 2025-04-28 ENCOUNTER — ROUTINE PRENATAL (OUTPATIENT)
Dept: OBSTETRICS AND GYNECOLOGY | Facility: CLINIC | Age: 40
End: 2025-04-28
Attending: OBSTETRICS & GYNECOLOGY
Payer: COMMERCIAL

## 2025-04-28 VITALS
DIASTOLIC BLOOD PRESSURE: 72 MMHG | SYSTOLIC BLOOD PRESSURE: 114 MMHG | WEIGHT: 181.31 LBS | BODY MASS INDEX: 29.26 KG/M2

## 2025-04-28 DIAGNOSIS — O09.819 SUPRVSN OF PREG RSLT FROM ASSISTED REPRODCTV TECH, UNSP TRI: Primary | ICD-10-CM

## 2025-04-28 PROCEDURE — 0502F SUBSEQUENT PRENATAL CARE: CPT | Mod: CPTII,S$GLB,, | Performed by: OBSTETRICS & GYNECOLOGY

## 2025-04-28 PROCEDURE — 99999 PR PBB SHADOW E&M-EST. PATIENT-LVL III: CPT | Mod: PBBFAC,,, | Performed by: OBSTETRICS & GYNECOLOGY

## 2025-04-28 NOTE — PROGRESS NOTES
Eczema of palms.  Discussed using clobetasol BID PRN.  Good response to iron, need to start mag/ colace for some associated constipation.   Patient seen and examined.    Some low back pain that is intermittent, right, SI area.  No complaints, denies VB/LOF/Ctxns, reports good fetal movement. Precautions for Bleeding/ ROM/ Decreased fetal movement/ Pre-E reviewed.  F/U scheduled 2 weeks

## 2025-05-08 ENCOUNTER — PATIENT MESSAGE (OUTPATIENT)
Dept: OTHER | Facility: OTHER | Age: 40
End: 2025-05-08
Payer: COMMERCIAL

## 2025-05-11 DIAGNOSIS — R12 HEARTBURN DURING PREGNANCY IN SECOND TRIMESTER: ICD-10-CM

## 2025-05-11 DIAGNOSIS — O26.892 HEARTBURN DURING PREGNANCY IN SECOND TRIMESTER: ICD-10-CM

## 2025-05-12 ENCOUNTER — ROUTINE PRENATAL (OUTPATIENT)
Dept: OBSTETRICS AND GYNECOLOGY | Facility: CLINIC | Age: 40
End: 2025-05-12
Attending: OBSTETRICS & GYNECOLOGY
Payer: COMMERCIAL

## 2025-05-12 ENCOUNTER — LAB VISIT (OUTPATIENT)
Dept: LAB | Facility: HOSPITAL | Age: 40
End: 2025-05-12
Attending: OBSTETRICS & GYNECOLOGY
Payer: COMMERCIAL

## 2025-05-12 VITALS
BODY MASS INDEX: 29.75 KG/M2 | DIASTOLIC BLOOD PRESSURE: 72 MMHG | SYSTOLIC BLOOD PRESSURE: 114 MMHG | WEIGHT: 184.31 LBS

## 2025-05-12 DIAGNOSIS — Z3A.35 35 WEEKS GESTATION OF PREGNANCY: Primary | ICD-10-CM

## 2025-05-12 DIAGNOSIS — Z3A.35 35 WEEKS GESTATION OF PREGNANCY: ICD-10-CM

## 2025-05-12 LAB
ABSOLUTE EOSINOPHIL (OHS): 0.14 K/UL
ABSOLUTE MONOCYTE (OHS): 0.76 K/UL (ref 0.3–1)
ABSOLUTE NEUTROPHIL COUNT (OHS): 7.36 K/UL (ref 1.8–7.7)
BASOPHILS # BLD AUTO: 0.03 K/UL
BASOPHILS NFR BLD AUTO: 0.3 %
ERYTHROCYTE [DISTWIDTH] IN BLOOD BY AUTOMATED COUNT: 13.2 % (ref 11.5–14.5)
HCT VFR BLD AUTO: 36.3 % (ref 37–48.5)
HGB BLD-MCNC: 11.4 GM/DL (ref 12–16)
HIV 1+2 AB+HIV1 P24 AG SERPL QL IA: NORMAL
IMM GRANULOCYTES # BLD AUTO: 0.15 K/UL (ref 0–0.04)
IMM GRANULOCYTES NFR BLD AUTO: 1.4 % (ref 0–0.5)
LYMPHOCYTES # BLD AUTO: 2.36 K/UL (ref 1–4.8)
MCH RBC QN AUTO: 29.5 PG (ref 27–31)
MCHC RBC AUTO-ENTMCNC: 31.4 G/DL (ref 32–36)
MCV RBC AUTO: 94 FL (ref 82–98)
NUCLEATED RBC (/100WBC) (OHS): 0 /100 WBC
PLATELET # BLD AUTO: 204 K/UL (ref 150–450)
PMV BLD AUTO: 11.1 FL (ref 9.2–12.9)
RBC # BLD AUTO: 3.86 M/UL (ref 4–5.4)
RELATIVE EOSINOPHIL (OHS): 1.3 %
RELATIVE LYMPHOCYTE (OHS): 21.9 % (ref 18–48)
RELATIVE MONOCYTE (OHS): 7 % (ref 4–15)
RELATIVE NEUTROPHIL (OHS): 68.1 % (ref 38–73)
T PALLIDUM IGG+IGM SER QL: NORMAL
WBC # BLD AUTO: 10.8 K/UL (ref 3.9–12.7)

## 2025-05-12 PROCEDURE — 87653 STREP B DNA AMP PROBE: CPT | Performed by: OBSTETRICS & GYNECOLOGY

## 2025-05-12 PROCEDURE — 36415 COLL VENOUS BLD VENIPUNCTURE: CPT | Mod: PO

## 2025-05-12 PROCEDURE — 87389 HIV-1 AG W/HIV-1&-2 AB AG IA: CPT

## 2025-05-12 PROCEDURE — 85025 COMPLETE CBC W/AUTO DIFF WBC: CPT

## 2025-05-12 PROCEDURE — 99999 PR PBB SHADOW E&M-EST. PATIENT-LVL III: CPT | Mod: PBBFAC,,, | Performed by: OBSTETRICS & GYNECOLOGY

## 2025-05-12 PROCEDURE — 86593 SYPHILIS TEST NON-TREP QUANT: CPT

## 2025-05-12 PROCEDURE — 0502F SUBSEQUENT PRENATAL CARE: CPT | Mod: CPTII,S$GLB,, | Performed by: OBSTETRICS & GYNECOLOGY

## 2025-05-12 RX ORDER — FAMOTIDINE 40 MG/1
40 TABLET, FILM COATED ORAL NIGHTLY
Qty: 30 TABLET | Refills: 1 | Status: SHIPPED | OUTPATIENT
Start: 2025-05-12

## 2025-05-12 NOTE — PROGRESS NOTES
Patient seen and examined.  No complaints, denies VB/LOF/Ctxns, reports good fetal movement.   GBS and t3 labs ordered.  Discussed delivery around EDC, membrane sweeps, etc/  Has PNT scheduled.  Some occasional tightening, no pattern of contractions.  Precautions for Bleeding/ ROM/ Decreased fetal movement/ Pre-E reviewed.  F/U scheduled 1 weeks

## 2025-05-16 ENCOUNTER — HOSPITAL ENCOUNTER (OUTPATIENT)
Dept: PERINATAL CARE | Facility: OTHER | Age: 40
Discharge: HOME | End: 2025-05-16
Attending: OBSTETRICS & GYNECOLOGY
Payer: COMMERCIAL

## 2025-05-16 DIAGNOSIS — O09.819 SUPRVSN OF PREG RSLT FROM ASSISTED REPRODCTV TECH, UNSP TRI: ICD-10-CM

## 2025-05-16 LAB — GROUP B STREPTOCOCCUS, PCR (OHS): NEGATIVE

## 2025-05-16 PROCEDURE — 76815 OB US LIMITED FETUS(S): CPT

## 2025-05-16 PROCEDURE — 59025 FETAL NON-STRESS TEST: CPT

## 2025-05-16 PROCEDURE — 76815 OB US LIMITED FETUS(S): CPT | Mod: 26,,, | Performed by: OBSTETRICS & GYNECOLOGY

## 2025-05-16 PROCEDURE — 59025 FETAL NON-STRESS TEST: CPT | Mod: 26,,, | Performed by: OBSTETRICS & GYNECOLOGY

## 2025-05-23 ENCOUNTER — HOSPITAL ENCOUNTER (OUTPATIENT)
Dept: PERINATAL CARE | Facility: OTHER | Age: 40
Discharge: HOME | End: 2025-05-23
Attending: OBSTETRICS & GYNECOLOGY
Payer: COMMERCIAL

## 2025-05-23 DIAGNOSIS — O09.819 SUPRVSN OF PREG RSLT FROM ASSISTED REPRODCTV TECH, UNSP TRI: ICD-10-CM

## 2025-05-23 PROCEDURE — 76818 FETAL BIOPHYS PROFILE W/NST: CPT | Mod: 26,,, | Performed by: OBSTETRICS & GYNECOLOGY

## 2025-05-23 PROCEDURE — 76818 FETAL BIOPHYS PROFILE W/NST: CPT

## 2025-05-26 ENCOUNTER — ROUTINE PRENATAL (OUTPATIENT)
Dept: OBSTETRICS AND GYNECOLOGY | Facility: CLINIC | Age: 40
End: 2025-05-26
Attending: OBSTETRICS & GYNECOLOGY
Payer: COMMERCIAL

## 2025-05-26 DIAGNOSIS — O09.819 SUPRVSN OF PREG RSLT FROM ASSISTED REPRODCTV TECH, UNSP TRI: ICD-10-CM

## 2025-05-26 DIAGNOSIS — Z98.891 HISTORY OF VBAC: Primary | ICD-10-CM

## 2025-05-26 PROCEDURE — 99999 PR PBB SHADOW E&M-EST. PATIENT-LVL III: CPT | Mod: PBBFAC,,, | Performed by: OBSTETRICS & GYNECOLOGY

## 2025-05-26 PROCEDURE — 0502F SUBSEQUENT PRENATAL CARE: CPT | Mod: CPTII,S$GLB,, | Performed by: OBSTETRICS & GYNECOLOGY

## 2025-05-30 ENCOUNTER — HOSPITAL ENCOUNTER (OUTPATIENT)
Dept: PERINATAL CARE | Facility: OTHER | Age: 40
Discharge: HOME | End: 2025-05-30
Attending: OBSTETRICS & GYNECOLOGY
Payer: COMMERCIAL

## 2025-05-30 DIAGNOSIS — O09.819 SUPRVSN OF PREG RSLT FROM ASSISTED REPRODCTV TECH, UNSP TRI: ICD-10-CM

## 2025-05-30 PROCEDURE — 76815 OB US LIMITED FETUS(S): CPT

## 2025-05-30 PROCEDURE — 59025 FETAL NON-STRESS TEST: CPT | Mod: 26,,, | Performed by: OBSTETRICS & GYNECOLOGY

## 2025-05-30 PROCEDURE — 59025 FETAL NON-STRESS TEST: CPT

## 2025-05-30 PROCEDURE — 76815 OB US LIMITED FETUS(S): CPT | Mod: 26,,, | Performed by: OBSTETRICS & GYNECOLOGY

## 2025-06-06 ENCOUNTER — ROUTINE PRENATAL (OUTPATIENT)
Dept: OBSTETRICS AND GYNECOLOGY | Facility: CLINIC | Age: 40
End: 2025-06-06
Payer: COMMERCIAL

## 2025-06-06 ENCOUNTER — HOSPITAL ENCOUNTER (OUTPATIENT)
Dept: PERINATAL CARE | Facility: OTHER | Age: 40
Discharge: HOME | End: 2025-06-06
Attending: OBSTETRICS & GYNECOLOGY
Payer: COMMERCIAL

## 2025-06-06 VITALS
SYSTOLIC BLOOD PRESSURE: 124 MMHG | DIASTOLIC BLOOD PRESSURE: 74 MMHG | WEIGHT: 184.94 LBS | BODY MASS INDEX: 29.85 KG/M2

## 2025-06-06 DIAGNOSIS — O09.819 SUPRVSN OF PREG RSLT FROM ASSISTED REPRODCTV TECH, UNSP TRI: ICD-10-CM

## 2025-06-06 DIAGNOSIS — Z3A.39 39 WEEKS GESTATION OF PREGNANCY: Primary | ICD-10-CM

## 2025-06-06 PROCEDURE — 59025 FETAL NON-STRESS TEST: CPT | Mod: 26,,, | Performed by: STUDENT IN AN ORGANIZED HEALTH CARE EDUCATION/TRAINING PROGRAM

## 2025-06-06 PROCEDURE — 76815 OB US LIMITED FETUS(S): CPT

## 2025-06-06 PROCEDURE — 99999 PR PBB SHADOW E&M-EST. PATIENT-LVL III: CPT | Mod: PBBFAC,,, | Performed by: NURSE PRACTITIONER

## 2025-06-06 PROCEDURE — 76815 OB US LIMITED FETUS(S): CPT | Mod: 26,,, | Performed by: STUDENT IN AN ORGANIZED HEALTH CARE EDUCATION/TRAINING PROGRAM

## 2025-06-06 PROCEDURE — 59025 FETAL NON-STRESS TEST: CPT

## 2025-06-11 ENCOUNTER — ROUTINE PRENATAL (OUTPATIENT)
Dept: OBSTETRICS AND GYNECOLOGY | Facility: CLINIC | Age: 40
End: 2025-06-11
Attending: OBSTETRICS & GYNECOLOGY
Payer: COMMERCIAL

## 2025-06-11 VITALS
DIASTOLIC BLOOD PRESSURE: 77 MMHG | SYSTOLIC BLOOD PRESSURE: 118 MMHG | BODY MASS INDEX: 29.86 KG/M2 | HEART RATE: 97 BPM | WEIGHT: 185 LBS

## 2025-06-11 DIAGNOSIS — O09.819 SUPRVSN OF PREG RSLT FROM ASSISTED REPRODCTV TECH, UNSP TRI: Primary | ICD-10-CM

## 2025-06-11 PROCEDURE — 0502F SUBSEQUENT PRENATAL CARE: CPT | Mod: CPTII,S$GLB,, | Performed by: OBSTETRICS & GYNECOLOGY

## 2025-06-11 PROCEDURE — 99999 PR PBB SHADOW E&M-EST. PATIENT-LVL III: CPT | Mod: PBBFAC,,, | Performed by: OBSTETRICS & GYNECOLOGY

## 2025-06-12 NOTE — H&P
HISTORY AND PHYSICAL                                                OBSTETRICS          Subjective:       Ray Ugarte is a 39 y.o.  female with IUP at 40w1d weeks gestation who presents for IOL.     This IUP is complicated by prior C/S x1 and successful VBAV, IVF pregnancy, AMA.  Patient denies contractions, denies vaginal bleeding, denies LOF.   Fetal Movement: normal.     PMHx:   Past Medical History:   Diagnosis Date    Abnormal Pap smear of cervix ?    No procedures necessary. Repeat pap normal    Allergy     Eczema        PSHx:   Past Surgical History:   Procedure Laterality Date    BIOPSY,LACRIMAL SAC Left 2024    Procedure: BIOPSY,LACRIMAL SAC;  Surgeon: Delores Kaba MD;  Location: [facility] OR;  Service: Ophthalmology;  Laterality: Left;     SECTION  04/24/2015    x1    DACRYOCYSTORHINOSTOMY, ENDOSCOPIC Left 2024    Procedure: DACRYOCYSTORHINOSTOMY, ENDOSCOPIC;  Surgeon: Delores Kaba MD;  Location: [facility] OR;  Service: Ophthalmology;  Laterality: Left;    INSERTION OF LACRIMAL TUBE Left 2024    Procedure: INSERTION, CABRERA TUBE;  Surgeon: Delores Kaba MD;  Location: [facility] OR;  Service: Ophthalmology;  Laterality: Left;    LASIK      TONSILLECTOMY         All:   Review of patient's allergies indicates:   Allergen Reactions    Shellfish containing products Nausea And Vomiting      Only mussels       Meds: Prescriptions Prior to Admission[1]    SH: Social History[2]    FH:   Family History   Problem Relation Name Age of Onset    No Known Problems Mother      Pancreatic cancer Father  75    Allergies Brother      Breast cancer Neg Hx      Colon cancer Neg Hx      Ovarian cancer Neg Hx         OBHx:   OB History    Para Term  AB Living   4 2 2 0 1 2   SAB IAB Ectopic Multiple Live Births   1 0 0 0 2      # Outcome Date GA Lbr Bubba/2nd Weight Sex Type Anes PTL Lv   4 Current            3 Term 20 39w6d  3.14 kg (6 lb 14.8 oz) F  EPI N JCARLOS       Name: GILLES VALLADARES      Apgar1: 8  Apgar5: 9   2 2018     SAB      1 Term 04/24/15 41w1d  4.054 kg (8 lb 15 oz) F CS-LTranv EPI N JCARLOS      Apgar1: 6  Apgar5: 9       Objective:       /70   Pulse 91   Resp 20   LMP 2024 (Exact Date)   SpO2 96%     Vitals:    25 1627 25 1629   BP: 130/70    Pulse: 92 91   Resp:  20   SpO2:  96%       General:   alert, appears stated age, and cooperative   Lungs:   clear to auscultation bilaterally   Heart:   regular rate and rhythm, S1, S2 normal, no murmur, click, rub or gallop   Abdomen:  soft, non-tender; bowel sounds normal; no masses,  no organomegaly   Extremities negative edema, negative erythema   FHT: 150 BPM, moderate BTBV, + accels, - decels Cat 1 (reassuring)                 TOCO: Q n/a    Presentations: cephalic by ultrasound   Cervix:     Dilation: 2cm    Effacement: 50%    Station:  -3    Consistency: soft    Position: middle   Sterile Speculum Exam: n/a     EFW by Leopold's: 8    Lab Review  Blood Type A POS  GBBS: negative  Rubella: Immune  RPR: neg, trep neg   HIV: negative  HepB: negative       Assessment:       40w1d weeks gestation:     Active Hospital Problems    Diagnosis  POA    *Encounter for induction of labor [Z34.90]  Not Applicable    History of  [Z98.891]  Not Applicable    History of  section [Z98.891]  Not Applicable    AMA (advanced maternal age) multigravida 35+, second trimester [O09.522]  Yes    Pregnancy resulting from in vitro fertilization in second trimester [O09.812]  Not Applicable      Resolved Hospital Problems   No resolved problems to display.          Plan:      Risks, benefits, alternatives and possible complications have been discussed in detail with the patient.     TOLAC  - Consents signed and to chart  - Admit to Labor and Delivery unit  - SVE /-3  - Low dose pitocin ordered     - Epidural per Anesthesia  - Draw CBC, T&S  - Notify Staff  - Recheck in 4 hrs or PRN  -  calc  50%  - Pt counseled regarding risks of TOALC, including increased risk of uterine rupture compared to general population. Pt endorses understanding and wishes to proceed with scheduled IOL     Post-Partum Hemorrhage risk - medium          AMA  - PP lovenox not indicated   - PP ambulation encouraged     Contraception  - Undecided     Tania Quiroz MD (Mary)   Obstetrics and Gynecology, PGY1           [1]   Medications Prior to Admission   Medication Sig Dispense Refill Last Dose/Taking    albuterol sulfate (PROAIR RESPICLICK) 90 mcg/actuation inhaler Inhale 1-2 puffs into the lungs every 6 (six) hours as needed for Wheezing or Shortness of Breath. Rescue (Patient not taking: Reported on 1/24/2025) 1 each 3     aspirin (ECOTRIN) 81 MG EC tablet Take 81 mg by mouth once daily.       clobetasoL (TEMOVATE) 0.05 % cream Apply topically 2 (two) times daily. For hand dermatitis (Patient not taking: Reported on 5/12/2025) 45 g 0     diphenhydrAMINE (BENADRYL) 25 mg capsule Take 25 mg by mouth every 6 (six) hours as needed for Itching.       famotidine (PEPCID) 40 MG tablet TAKE 1 TABLET BY MOUTH EVERY DAY IN THE EVENING 30 tablet 1     fluticasone propionate (FLONASE) 50 mcg/actuation nasal spray USE 1 SPRAY (50 MCG TOTAL) IN EACH NOSTRIL ONCE DAILY (Patient not taking: Reported on 5/12/2025) 16 mL 1     omega-3 fatty acids/fish oil (FISH OIL-OMEGA-3 FATTY ACIDS) 300-1,000 mg capsule Take by mouth once daily.       prenatal vit no.124/iron/folic (PRENATAL VITAMIN ORAL) Take 1 Dose by mouth Daily.      [2]   Social History  Socioeconomic History    Marital status:    Tobacco Use    Smoking status: Never     Passive exposure: Never    Smokeless tobacco: Never   Substance and Sexual Activity    Alcohol use: Not Currently     Comment: occasionally    Drug use: No    Sexual activity: Yes     Partners: Male     Birth control/protection: None   Social History Narrative    Works as     Lives with  and  daughter     Social Drivers of Health     Financial Resource Strain: Low Risk  (2/6/2024)    Overall Financial Resource Strain (CARDIA)     Difficulty of Paying Living Expenses: Not hard at all   Food Insecurity: No Food Insecurity (2/6/2024)    Hunger Vital Sign     Worried About Running Out of Food in the Last Year: Never true     Ran Out of Food in the Last Year: Never true   Transportation Needs: No Transportation Needs (2/6/2024)    PRAPARE - Transportation     Lack of Transportation (Medical): No     Lack of Transportation (Non-Medical): No   Physical Activity: Sufficiently Active (2/6/2024)    Exercise Vital Sign     Days of Exercise per Week: 7 days     Minutes of Exercise per Session: 30 min   Stress: Stress Concern Present (2/6/2024)    [employer] - Occupational Stress Questionnaire     Feeling of Stress : Very much   Housing Stability: Low Risk  (2/6/2024)    Housing Stability Vital Sign     Unable to Pay for Housing in the Last Year: No     Number of Places Lived in the Last Year: 1     Unstable Housing in the Last Year: No

## 2025-06-13 ENCOUNTER — HOSPITAL ENCOUNTER (INPATIENT)
Facility: OTHER | Age: 40
LOS: 5 days | Discharge: HOME | End: 2025-06-18
Attending: OBSTETRICS & GYNECOLOGY | Admitting: STUDENT IN AN ORGANIZED HEALTH CARE EDUCATION/TRAINING PROGRAM
Payer: COMMERCIAL

## 2025-06-13 ENCOUNTER — ANESTHESIA EVENT (OUTPATIENT)
Dept: OBSTETRICS AND GYNECOLOGY | Facility: OTHER | Age: 40
End: 2025-06-13
Payer: COMMERCIAL

## 2025-06-13 ENCOUNTER — ANESTHESIA (OUTPATIENT)
Dept: OBSTETRICS AND GYNECOLOGY | Facility: OTHER | Age: 40
End: 2025-06-13
Payer: COMMERCIAL

## 2025-06-13 ENCOUNTER — PATIENT MESSAGE (OUTPATIENT)
Dept: OBSTETRICS AND GYNECOLOGY | Facility: OTHER | Age: 40
End: 2025-06-13
Payer: COMMERCIAL

## 2025-06-13 DIAGNOSIS — O36.8390 NON-REASSURING FETAL HEART TONES COMPLICATING PREGNANCY, ANTEPARTUM: ICD-10-CM

## 2025-06-13 DIAGNOSIS — O09.819 SUPRVSN OF PREG RSLT FROM ASSISTED REPRODCTV TECH, UNSP TRI: ICD-10-CM

## 2025-06-13 DIAGNOSIS — Z98.891 STATUS POST REPEAT LOW TRANSVERSE CESAREAN SECTION: Primary | ICD-10-CM

## 2025-06-13 DIAGNOSIS — Z34.90 ENCOUNTER FOR INDUCTION OF LABOR: ICD-10-CM

## 2025-06-13 LAB
ABSOLUTE EOSINOPHIL (OHS): 0.13 K/UL
ABSOLUTE MONOCYTE (OHS): 0.85 K/UL (ref 0.3–1)
ABSOLUTE NEUTROPHIL COUNT (OHS): 8.88 K/UL (ref 1.8–7.7)
BASOPHILS # BLD AUTO: 0.03 K/UL
BASOPHILS NFR BLD AUTO: 0.2 %
ERYTHROCYTE [DISTWIDTH] IN BLOOD BY AUTOMATED COUNT: 13.2 % (ref 11.5–14.5)
HCT VFR BLD AUTO: 36.7 % (ref 37–48.5)
HGB BLD-MCNC: 12.7 GM/DL (ref 12–16)
IMM GRANULOCYTES # BLD AUTO: 0.14 K/UL (ref 0–0.04)
IMM GRANULOCYTES NFR BLD AUTO: 1.1 % (ref 0–0.5)
INDIRECT COOMBS: NORMAL
LYMPHOCYTES # BLD AUTO: 2.64 K/UL (ref 1–4.8)
MCH RBC QN AUTO: 30.9 PG (ref 27–31)
MCHC RBC AUTO-ENTMCNC: 34.6 G/DL (ref 32–36)
MCV RBC AUTO: 89 FL (ref 82–98)
NUCLEATED RBC (/100WBC) (OHS): 0 /100 WBC
PLATELET # BLD AUTO: 225 K/UL (ref 150–450)
PMV BLD AUTO: 10.6 FL (ref 9.2–12.9)
RBC # BLD AUTO: 4.11 M/UL (ref 4–5.4)
RELATIVE EOSINOPHIL (OHS): 1 %
RELATIVE LYMPHOCYTE (OHS): 20.8 % (ref 18–48)
RELATIVE MONOCYTE (OHS): 6.7 % (ref 4–15)
RELATIVE NEUTROPHIL (OHS): 70.2 % (ref 38–73)
RH BLD: NORMAL
SPECIMEN OUTDATE: NORMAL
T PALLIDUM IGG+IGM SER QL: NEGATIVE
WBC # BLD AUTO: 12.67 K/UL (ref 3.9–12.7)

## 2025-06-13 PROCEDURE — 85025 COMPLETE CBC W/AUTO DIFF WBC: CPT

## 2025-06-13 PROCEDURE — 25000003 PHARM REV CODE 250

## 2025-06-13 PROCEDURE — 11000001 HC ACUTE MED/SURG PRIVATE ROOM

## 2025-06-13 PROCEDURE — 3E033VJ INTRODUCTION OF OTHER HORMONE INTO PERIPHERAL VEIN, PERCUTANEOUS APPROACH: ICD-10-PCS | Performed by: STUDENT IN AN ORGANIZED HEALTH CARE EDUCATION/TRAINING PROGRAM

## 2025-06-13 PROCEDURE — 72100002 HC LABOR CARE, 1ST 8 HOURS: Performed by: STUDENT IN AN ORGANIZED HEALTH CARE EDUCATION/TRAINING PROGRAM

## 2025-06-13 PROCEDURE — 86593 SYPHILIS TEST NON-TREP QUANT: CPT

## 2025-06-13 PROCEDURE — 63600175 PHARM REV CODE 636 W HCPCS

## 2025-06-13 PROCEDURE — 86850 RBC ANTIBODY SCREEN: CPT | Performed by: OBSTETRICS & GYNECOLOGY

## 2025-06-13 RX ORDER — CEFAZOLIN 2 G/1
2 INJECTION, POWDER, FOR SOLUTION INTRAMUSCULAR; INTRAVENOUS ONCE AS NEEDED
Status: COMPLETED | OUTPATIENT
Start: 2025-06-13 | End: 2025-06-15

## 2025-06-13 RX ORDER — MISOPROSTOL 200 UG/1
800 TABLET ORAL ONCE AS NEEDED
Status: DISCONTINUED | OUTPATIENT
Start: 2025-06-13 | End: 2025-06-15

## 2025-06-13 RX ORDER — DIPHENOXYLATE HYDROCHLORIDE AND ATROPINE SULFATE 2.5; .025 MG/1; MG/1
2 TABLET ORAL EVERY 6 HOURS PRN
Status: DISCONTINUED | OUTPATIENT
Start: 2025-06-13 | End: 2025-06-15

## 2025-06-13 RX ORDER — ONDANSETRON 8 MG/1
8 TABLET, ORALLY DISINTEGRATING ORAL EVERY 8 HOURS PRN
Status: DISCONTINUED | OUTPATIENT
Start: 2025-06-13 | End: 2025-06-15

## 2025-06-13 RX ORDER — LIDOCAINE HYDROCHLORIDE 10 MG/ML
10 INJECTION, SOLUTION INFILTRATION; PERINEURAL ONCE AS NEEDED
Status: DISCONTINUED | OUTPATIENT
Start: 2025-06-13 | End: 2025-06-15

## 2025-06-13 RX ORDER — OXYTOCIN-SODIUM CHLORIDE 0.9% IV SOLN 30 UNIT/500ML 30-0.9/5 UT/ML-%
10 SOLUTION INTRAVENOUS ONCE AS NEEDED
Status: DISCONTINUED | OUTPATIENT
Start: 2025-06-13 | End: 2025-06-15

## 2025-06-13 RX ORDER — OXYTOCIN 10 [USP'U]/ML
10 INJECTION, SOLUTION INTRAMUSCULAR; INTRAVENOUS
Status: DISCONTINUED | OUTPATIENT
Start: 2025-06-13 | End: 2025-06-15

## 2025-06-13 RX ORDER — SIMETHICONE 80 MG
1 TABLET,CHEWABLE ORAL 4 TIMES DAILY PRN
Status: DISCONTINUED | OUTPATIENT
Start: 2025-06-13 | End: 2025-06-15

## 2025-06-13 RX ORDER — OXYTOCIN-SODIUM CHLORIDE 0.9% IV SOLN 30 UNIT/500ML 30-0.9/5 UT/ML-%
95 SOLUTION INTRAVENOUS ONCE AS NEEDED
Status: DISCONTINUED | OUTPATIENT
Start: 2025-06-13 | End: 2025-06-15

## 2025-06-13 RX ORDER — OXYTOCIN-SODIUM CHLORIDE 0.9% IV SOLN 30 UNIT/500ML 30-0.9/5 UT/ML-%
0-32 SOLUTION INTRAVENOUS CONTINUOUS
Status: DISCONTINUED | OUTPATIENT
Start: 2025-06-13 | End: 2025-06-15

## 2025-06-13 RX ORDER — ACETAMINOPHEN 325 MG/1
650 TABLET ORAL EVERY 6 HOURS PRN
Status: DISCONTINUED | OUTPATIENT
Start: 2025-06-13 | End: 2025-06-15

## 2025-06-13 RX ORDER — OXYTOCIN 10 [USP'U]/ML
10 INJECTION, SOLUTION INTRAMUSCULAR; INTRAVENOUS ONCE AS NEEDED
Status: DISCONTINUED | OUTPATIENT
Start: 2025-06-13 | End: 2025-06-15

## 2025-06-13 RX ORDER — SODIUM CHLORIDE, SODIUM LACTATE, POTASSIUM CHLORIDE, CALCIUM CHLORIDE 600; 310; 30; 20 MG/100ML; MG/100ML; MG/100ML; MG/100ML
INJECTION, SOLUTION INTRAVENOUS CONTINUOUS
Status: DISCONTINUED | OUTPATIENT
Start: 2025-06-13 | End: 2025-06-15

## 2025-06-13 RX ORDER — CALCIUM CARBONATE 200(500)MG
500 TABLET,CHEWABLE ORAL 3 TIMES DAILY PRN
Status: DISCONTINUED | OUTPATIENT
Start: 2025-06-13 | End: 2025-06-15

## 2025-06-13 RX ORDER — CARBOPROST TROMETHAMINE 250 UG/ML
250 INJECTION, SOLUTION INTRAMUSCULAR
Status: DISCONTINUED | OUTPATIENT
Start: 2025-06-13 | End: 2025-06-15

## 2025-06-13 RX ORDER — TRANEXAMIC ACID 10 MG/ML
1000 INJECTION, SOLUTION INTRAVENOUS EVERY 30 MIN PRN
Status: DISCONTINUED | OUTPATIENT
Start: 2025-06-13 | End: 2025-06-15

## 2025-06-13 RX ORDER — OXYTOCIN-SODIUM CHLORIDE 0.9% IV SOLN 30 UNIT/500ML 30-0.9/5 UT/ML-%
95 SOLUTION INTRAVENOUS CONTINUOUS PRN
Status: DISCONTINUED | OUTPATIENT
Start: 2025-06-13 | End: 2025-06-15

## 2025-06-13 RX ORDER — SODIUM CHLORIDE 9 MG/ML
INJECTION, SOLUTION INTRAVENOUS
Status: DISCONTINUED | OUTPATIENT
Start: 2025-06-13 | End: 2025-06-15

## 2025-06-13 RX ORDER — METHYLERGONOVINE MALEATE 0.2 MG/ML
200 INJECTION INTRAVENOUS ONCE AS NEEDED
Status: DISCONTINUED | OUTPATIENT
Start: 2025-06-13 | End: 2025-06-15

## 2025-06-13 RX ADMIN — CALCIUM CARBONATE (ANTACID) CHEW TAB 500 MG 500 MG: 500 CHEW TAB at 07:06

## 2025-06-13 RX ADMIN — SODIUM CHLORIDE, POTASSIUM CHLORIDE, SODIUM LACTATE AND CALCIUM CHLORIDE 200 ML: 600; 310; 30; 20 INJECTION, SOLUTION INTRAVENOUS at 11:06

## 2025-06-13 RX ADMIN — OXYTOCIN-SODIUM CHLORIDE 0.9% IV SOLN 30 UNIT/500ML 2 MILLI-UNITS/MIN: 30-0.9/5 SOLUTION at 06:06

## 2025-06-13 NOTE — ANESTHESIA PREPROCEDURE EVALUATION
[facility]  Anesthesia Pre-Operative Evaluation         Patient Name: Ray Ugarte  YOB: 1985  MRN: [id number]    2025      Ray Ugarte is a 39 y.o. female  @ 40w1d who presents for IOL. IUP c/b AMA, IVF pregnancy, hx of .   Patient denies cardiopulmonary disease, bleeding disorders, or spine pathology.     OB History    Para Term  AB Living   4 2 2  1 2   SAB IAB Ectopic Multiple Live Births   1   0 2      # Outcome Date GA Lbr Bubba/2nd Weight Sex Type Anes PTL Lv   4 Current            3 Term 20 39w6d  3.14 kg (6 lb 14.8 oz) F  EPI N JCARLOS   2 2018     SAB      1 Term 04/24/15 41w1d  4.054 kg (8 lb 15 oz) F CS-LTranv EPI N JCARLOS       Review of patient's allergies indicates:   Allergen Reactions    Shellfish containing products Nausea And Vomiting      Only mussels       Wt Readings from Last 1 Encounters:   25 0809 83.9 kg (185 lb)       BP Readings from Last 3 Encounters:   25 130/70   25 118/77   25 124/74       Problem List[1]    Past Surgical History:   Procedure Laterality Date    BIOPSY,LACRIMAL SAC Left 2024    Procedure: BIOPSY,LACRIMAL SAC;  Surgeon: Delores Kaba MD;  Location: [facility] OR;  Service: Ophthalmology;  Laterality: Left;     SECTION  04/24/2015    x1    DACRYOCYSTORHINOSTOMY, ENDOSCOPIC Left 2024    Procedure: DACRYOCYSTORHINOSTOMY, ENDOSCOPIC;  Surgeon: Delores Kaba MD;  Location: [facility] OR;  Service: Ophthalmology;  Laterality: Left;    INSERTION OF LACRIMAL TUBE Left 2024    Procedure: INSERTION, CABRERA TUBE;  Surgeon: Delores Kaba MD;  Location: [facility] OR;  Service: Ophthalmology;  Laterality: Left;    LASIK      TONSILLECTOMY         Social History[2]      Chemistry        Component Value Date/Time     2024 1110    K 3.8 2024 1110     2024 1110    CO2 20 (L) 2024 1110    BUN 6 2024 1110    CREATININE 0.7  "11/19/2024 1110    GLU 80 11/19/2024 1110        Component Value Date/Time    CALCIUM 9.3 11/19/2024 1110    ALKPHOS 39 (L) 11/19/2024 1110    AST 14 11/19/2024 1110    ALT 10 11/19/2024 1110    BILITOT 0.2 11/19/2024 1110    ESTGFRAFRICA >60 02/20/2020 1033    EGFRNONAA >60 02/20/2020 1033            Lab Results   Component Value Date    WBC 10.80 05/12/2025    HGB 11.4 (L) 05/12/2025    HCT 36.3 (L) 05/12/2025    MCV 94 05/12/2025     05/12/2025       No results for input(s): "PT", "INR", "PROTIME", "APTT" in the last 72 hours.          Pre-op Assessment    I have reviewed the Patient Summary Reports.     I have reviewed the Nursing Notes. I have reviewed the NPO Status.   I have reviewed the Medications.     Review of Systems  Anesthesia Hx:  No problems with previous Anesthesia   History of prior surgery of interest to airway management or planning:          Denies Family Hx of Anesthesia complications.    Denies Personal Hx of Anesthesia complications.                    Social:  Non-Smoker, No Alcohol Use       Hematology/Oncology:                      Denies Current/Recent Cancer                EENT/Dental:  denies chronic allergic rhinitis           Cardiovascular:      Denies Hypertension.    Denies CAD.           no hyperlipidemia                               Pulmonary:    Denies COPD.  Denies Asthma.     Denies Sleep Apnea.                Renal/:   Denies Chronic Renal Disease.                Hepatic/GI:      Denies GERD.                Musculoskeletal:  Denies Arthritis.               Neurological:    Denies CVA.    Denies Seizures.                                Psych:  Denies Psychiatric History.                  Physical Exam  General: Well nourished, Cooperative, Alert and Oriented    Airway:  Mallampati: II   Mouth Opening: Normal  TM Distance: Normal  Tongue: Normal  Neck ROM: Normal ROM    Dental:  Intact        Anesthesia Plan  Type of Anesthesia, risks & benefits " discussed:    Anesthesia Type: CSE, Gen ETT, Spinal, Epidural  Intra-op Monitoring Plan: Standard ASA Monitors  Post Op Pain Control Plan: multimodal analgesia  Induction:  IV  Airway Plan: Video, Post-Induction  Informed Consent: Informed consent signed with the Patient and all parties understand the risks and agree with anesthesia plan.  All questions answered.   ASA Score: 3  Day of Surgery Review of History & Physical: H&P Update referred to the surgeon/provider.    Ready For Surgery From Anesthesia Perspective.     .           [1]   Patient Active Problem List  Diagnosis    Instability of pelvis or thigh joint    Weakness of both hips    Acetabular labrum tear    Amenorrhea    Pregnancy resulting from in vitro fertilization in second trimester    History of fever    AMA (advanced maternal age) multigravida 35+, second trimester    Low-lying placenta in second trimester    History of     History of  section    Encounter for induction of labor   [2]   Social History  Socioeconomic History    Marital status:    Tobacco Use    Smoking status: Never     Passive exposure: Never    Smokeless tobacco: Never   Substance and Sexual Activity    Alcohol use: Not Currently     Comment: occasionally    Drug use: No    Sexual activity: Yes     Partners: Male     Birth control/protection: None   Social History Narrative    Works as     Lives with  and daughter     Social Drivers of Health     Financial Resource Strain: Low Risk  (2024)    Overall Financial Resource Strain (CARDIA)     Difficulty of Paying Living Expenses: Not hard at all   Food Insecurity: No Food Insecurity (2024)    Hunger Vital Sign     Worried About Running Out of Food in the Last Year: Never true     Ran Out of Food in the Last Year: Never true   Transportation Needs: No Transportation Needs (2024)    PRAPARE - Transportation     Lack of Transportation (Medical): No     Lack of Transportation  (Non-Medical): No   Physical Activity: Sufficiently Active (2/6/2024)    Exercise Vital Sign     Days of Exercise per Week: 7 days     Minutes of Exercise per Session: 30 min   Stress: Stress Concern Present (2/6/2024)    [employer] - Occupational Stress Questionnaire     Feeling of Stress : Very much   Housing Stability: Low Risk  (2/6/2024)    Housing Stability Vital Sign     Unable to Pay for Housing in the Last Year: No     Number of Places Lived in the Last Year: 1     Unstable Housing in the Last Year: No

## 2025-06-14 PROCEDURE — 27200710 HC EPIDURAL INFUSION PUMP SET: Performed by: ANESTHESIOLOGY

## 2025-06-14 PROCEDURE — 11000001 HC ACUTE MED/SURG PRIVATE ROOM

## 2025-06-14 PROCEDURE — 62326 NJX INTERLAMINAR LMBR/SAC: CPT | Performed by: STUDENT IN AN ORGANIZED HEALTH CARE EDUCATION/TRAINING PROGRAM

## 2025-06-14 PROCEDURE — 51702 INSERT TEMP BLADDER CATH: CPT

## 2025-06-14 PROCEDURE — 25000003 PHARM REV CODE 250: Performed by: STUDENT IN AN ORGANIZED HEALTH CARE EDUCATION/TRAINING PROGRAM

## 2025-06-14 PROCEDURE — C1751 CATH, INF, PER/CENT/MIDLINE: HCPCS | Performed by: ANESTHESIOLOGY

## 2025-06-14 PROCEDURE — 63600175 PHARM REV CODE 636 W HCPCS: Performed by: STUDENT IN AN ORGANIZED HEALTH CARE EDUCATION/TRAINING PROGRAM

## 2025-06-14 PROCEDURE — 63600175 PHARM REV CODE 636 W HCPCS

## 2025-06-14 PROCEDURE — 72100003 HC LABOR CARE, EA. ADDL. 8 HRS: Performed by: STUDENT IN AN ORGANIZED HEALTH CARE EDUCATION/TRAINING PROGRAM

## 2025-06-14 PROCEDURE — 25000003 PHARM REV CODE 250

## 2025-06-14 RX ORDER — SODIUM CITRATE AND CITRIC ACID MONOHYDRATE 334; 500 MG/5ML; MG/5ML
30 SOLUTION ORAL ONCE
Status: COMPLETED | OUTPATIENT
Start: 2025-06-14 | End: 2025-06-15

## 2025-06-14 RX ORDER — FENTANYL/BUPIVACAINE/NS/PF 2MCG/ML-.1
PLASTIC BAG, INJECTION (ML) INJECTION
Status: DISCONTINUED | OUTPATIENT
Start: 2025-06-14 | End: 2025-06-15

## 2025-06-14 RX ORDER — FENTANYL/BUPIVACAINE/NS/PF 2MCG/ML-.1
PLASTIC BAG, INJECTION (ML) INJECTION
Status: COMPLETED
Start: 2025-06-14 | End: 2025-06-14

## 2025-06-14 RX ORDER — LIDOCAINE HYDROCHLORIDE AND EPINEPHRINE 15; 5 MG/ML; UG/ML
INJECTION, SOLUTION EPIDURAL
Status: DISCONTINUED | OUTPATIENT
Start: 2025-06-14 | End: 2025-06-15

## 2025-06-14 RX ORDER — BUPIVACAINE HYDROCHLORIDE 2.5 MG/ML
INJECTION, SOLUTION EPIDURAL; INFILTRATION; INTRACAUDAL; PERINEURAL
Status: DISPENSED
Start: 2025-06-14 | End: 2025-06-15

## 2025-06-14 RX ORDER — FAMOTIDINE 10 MG/ML
20 INJECTION, SOLUTION INTRAVENOUS ONCE
Status: COMPLETED | OUTPATIENT
Start: 2025-06-14 | End: 2025-06-14

## 2025-06-14 RX ORDER — FAMOTIDINE 10 MG/ML
20 INJECTION, SOLUTION INTRAVENOUS ONCE
Status: COMPLETED | OUTPATIENT
Start: 2025-06-14 | End: 2025-06-15

## 2025-06-14 RX ADMIN — CALCIUM CARBONATE (ANTACID) CHEW TAB 500 MG 500 MG: 500 CHEW TAB at 09:06

## 2025-06-14 RX ADMIN — ONDANSETRON 8 MG: 8 TABLET, ORALLY DISINTEGRATING ORAL at 04:06

## 2025-06-14 RX ADMIN — LIDOCAINE HYDROCHLORIDE,EPINEPHRINE BITARTRATE 5 ML: 15; .005 INJECTION, SOLUTION EPIDURAL; INFILTRATION; INTRACAUDAL; PERINEURAL at 03:06

## 2025-06-14 RX ADMIN — FAMOTIDINE 20 MG: 10 INJECTION, SOLUTION INTRAVENOUS at 09:06

## 2025-06-14 RX ADMIN — FENTANYL CITRATE 8 ML/HR: 50 INJECTION INTRAMUSCULAR; INTRAVENOUS at 09:06

## 2025-06-14 RX ADMIN — FENTANYL CITRATE 8 ML/HR: 50 INJECTION INTRAMUSCULAR; INTRAVENOUS at 03:06

## 2025-06-14 RX ADMIN — FENTANYL CITRATE 5 ML: 50 INJECTION INTRAMUSCULAR; INTRAVENOUS at 03:06

## 2025-06-14 NOTE — PROGRESS NOTES
"LABOR NOTE    S:  Complaints: No.  Epidural Working:  yes   Resident to bedside for routine cervical check     O: BP (!) 99/56   Pulse 83   Temp 98.3 °F (36.8 °C) (Oral)   Resp 16   Ht 5' 6" (1.676 m)   Wt 83.9 kg (185 lb)   LMP 09/05/2024 (Exact Date)   SpO2 97%   Breastfeeding No   BMI 29.86 kg/m²     FHT: 135 BPM, moderate BTBV, + accels, - decels Cat 1 (reassuring)  CTX: q 4 minutes, pit @ 10  SVE: 3/50/-3    TIMELINE:   1630: 2/40/-3  2045: 2/40/-3, pit decreased 8> 6 with return of FHT to baseline   0045: 2/50/-3, carranza balloon attempted but not tolerated, pitocin continued   0640: 3/50/-3, pit @ 10, Pt declines AROM     PLAN:      Continue Close Maternal/Fetal Monitoring  Pitocin Augmentation per protocol  Recheck 4 hours or PRN      Tania Quiroz MD (Mary)   Obstetrics and Gynecology, PGY1      "

## 2025-06-14 NOTE — PROGRESS NOTES
"LABOR NOTE    S:  Complaints: No.  Epidural Working:  not applicable  Resident to bedside in the setting of mild recurrent late decelerations     O: /75   Pulse 83   Temp 97.8 °F (36.6 °C) (Oral)   Resp 16   Ht 5' 6" (1.676 m)   Wt 83.9 kg (185 lb)   LMP 09/05/2024 (Exact Date)   SpO2 97%   Breastfeeding No   BMI 29.86 kg/m²     FHT: 135 BPM, moderate BTBV, + accels, + late decels to 140s Cat 1 (reassuring)  CTX: q 3 minutes, pit @ 8  SVE: 2/40/-3    TIMELINE:   1630: 2/40/-3  2045: 2/40/-3, pit decreased 8> 6 with return of FHT to baseline   0045: 2/50/-3, carranza balloon attempted but not tolerated, pitocin continued     PLAN:      Continue Close Maternal/Fetal Monitoring  Pitocin Augmentation per protocol  Recheck 4 hours or PRN      Tania Quiroz MD (Mary)   Obstetrics and Gynecology, PGY1      "

## 2025-06-14 NOTE — PROGRESS NOTES
Explained my role as OB Staff  Questions answered  Continue active management of labor  Reviewed FHT - Cat I     SVE 4/50/-2, AROM offered and recommended, patient declined

## 2025-06-14 NOTE — PROGRESS NOTES
"LABOR NOTE    S:  Complaints: No.  Epidural Working:  not applicable  Resident to bedside in the setting of mild recurrent late decelerations     O: /76   Pulse 81   Temp 98.5 °F (36.9 °C) (Oral)   Resp 16   Ht 5' 6" (1.676 m)   Wt 83.9 kg (185 lb)   LMP 09/05/2024 (Exact Date)   SpO2 96%   Breastfeeding No   BMI 29.86 kg/m²     FHT: 160 BPM, moderate BTBV, + accels, + late decels to 140s Cat 1 (reassuring)  CTX: q 3 minutes, pit @ 8  SVE: 2/40/-3    TIMELINE:   1630: 2/40/-3  2045: 2/40/-3, pit decreased 8> 6 with return of FHT to baseline     PLAN:      Continue Close Maternal/Fetal Monitoring  Pitocin Augmentation per protocol  Recheck 4 hours or PRN      Tania Quiroz MD (Mary)   Obstetrics and Gynecology, PGY1      "

## 2025-06-14 NOTE — PLAN OF CARE
Problem: Adult Inpatient Plan of Care  Goal: Plan of Care Review  Outcome: Progressing     Problem: Adult Inpatient Plan of Care  Goal: Optimal Comfort and Wellbeing  Outcome: Progressing     Problem:  Fall Injury Risk  Goal: Absence of Fall, Infant Drop and Related Injury  Outcome: Progressing     Problem: Labor  Goal: Hemostasis  Outcome: Progressing  Goal: Stable Fetal Wellbeing  Outcome: Progressing  Goal: Effective Progression to Delivery  Outcome: Progressing  Goal: Absence of Infection Signs and Symptoms  Outcome: Progressing  Goal: Acceptable Pain Control  Outcome: Progressing  Goal: Normal Uterine Contraction Pattern  Outcome: Progressing     Problem: Anesthesia/Analgesia, Neuraxial  Goal: Stable Patient-Fetal Status  Outcome: Progressing     Problem: Anesthesia/Analgesia, Neuraxial  Goal: Safe, Effective Infusion Delivery  Outcome: Progressing     Problem: Anesthesia/Analgesia, Neuraxial  Goal: Nausea and Vomiting Relief  Outcome: Progressing     Problem: Anesthesia/Analgesia, Neuraxial  Goal: Effective Pain Control  Outcome: Progressing     Problem: Anesthesia/Analgesia, Neuraxial  Goal: Effective Urinary Elimination  Outcome: Progressing

## 2025-06-14 NOTE — PROGRESS NOTES
"LABOR NOTE    S:  Complaints: No.  Epidural Working:  yes   Resident to bedside for routine cervical check     O: /68   Pulse 83   Temp 97.9 °F (36.6 °C) (Oral)   Resp 16   Ht 5' 6" (1.676 m)   Wt 83.9 kg (185 lb)   LMP 09/05/2024 (Exact Date)   SpO2 96%   Breastfeeding No   BMI 29.86 kg/m²     FHT: 135 BPM, moderate BTBV, + accels, - decels Cat 1 (reassuring)  CTX: difficult to discern, pit @ 10  SVE: 5/80/-2, declines AROM     TIMELINE:   1630: 2/40/-3  2045: 2/40/-3, pit decreased 8> 6 with return of FHT to baseline   0045: 2/50/-3, carranza balloon attempted but not tolerated, pitocin continued   0640: 3/50/-3, pit @ 10, declines AROM   1100: 4/50/-2,   1500: 5/80/-2, pit @ 10, declines AROM     PLAN:    Continue Close Maternal/Fetal Monitoring  Pitocin Augmentation per protocol  Recheck 4 hours or PRN    Dary Fernandez MD  OBGYN   PGY-1        "

## 2025-06-14 NOTE — NURSING
Observed patient incontinent pad moderately saturation with clear/red fluid at 1830 indicating potential SROM between [id number]. Incontinent pad changed. MD notified and aware.

## 2025-06-14 NOTE — PLAN OF CARE
Problem: Adult Inpatient Plan of Care  Goal: Plan of Care Review  Outcome: Progressing  Goal: Patient-Specific Goal (Individualized)  Outcome: Progressing  Goal: Absence of Hospital-Acquired Illness or Injury  Outcome: Progressing  Goal: Optimal Comfort and Wellbeing  Outcome: Progressing  Goal: Readiness for Transition of Care  Outcome: Progressing     Problem:  Fall Injury Risk  Goal: Absence of Fall, Infant Drop and Related Injury  Outcome: Progressing     Problem: Labor  Goal: Hemostasis  Outcome: Progressing  Goal: Stable Fetal Wellbeing  Outcome: Progressing  Goal: Effective Progression to Delivery  Outcome: Progressing  Goal: Absence of Infection Signs and Symptoms  Outcome: Progressing  Goal: Acceptable Pain Control  Outcome: Progressing  Goal: Normal Uterine Contraction Pattern  Outcome: Progressing     Problem: Infection  Goal: Absence of Infection Signs and Symptoms  Outcome: Progressing     Problem: Hospitalized  Patient  Goal: Optimal Patient-Fetal Wellbeing  Outcome: Progressing     Problem: Anesthesia/Analgesia, Neuraxial  Goal: Safe, Effective Infusion Delivery  Outcome: Progressing  Goal: Stable Patient-Fetal Status  Outcome: Progressing  Goal: Absence of Infection Signs and Symptoms  Outcome: Progressing  Goal: Nausea and Vomiting Relief  Outcome: Progressing  Goal: Effective Pain Control  Outcome: Progressing  Goal: Effective Oxygenation and Ventilation  Outcome: Progressing  Goal: Baseline Motor Function Return  Outcome: Progressing  Goal: Effective Urinary Elimination  Outcome: Progressing     Problem: Skin Injury Risk Increased  Goal: Skin Health and Integrity  Outcome: Progressing

## 2025-06-14 NOTE — ANESTHESIA PROCEDURE NOTES
Epidural    Patient location during procedure: OB   Reason for block: primary anesthetic   Reason for block: labor analgesia requested by patient and obstetrician  Diagnosis: iup   Start time: 6/14/2025 3:01 AM  Timeout: 6/14/2025 3:00 AM  End time: 6/14/2025 3:20 AM  Surgery related to: Vaginal Delivery    Staffing  Performing Provider: Amy Livingston DO  Authorizing Provider: Payal Abbasi MD    Staffing  Performed by: Amy Livingston DO  Authorized by: Payal Abbasi MD        Preanesthetic Checklist  Completed: patient identified, IV checked, site marked, risks and benefits discussed, surgical consent, monitors and equipment checked, pre-op evaluation, timeout performed, anesthesia consent given, hand hygiene performed and patient being monitored  Preparation  Patient position: sitting  Prep: ChloraPrep  Patient monitoring: Pulse Ox  Reason for block: primary anesthetic   Epidural  Skin Anesthetic: lidocaine 1%  Skin Wheal: 3 mL  Administration type: continuous  Approach: midline  Interspace: L3-4    Injection technique: SHAHEEN saline  Needle and Epidural Catheter  Needle type: Tuohy   Needle gauge: 17  Needle length: 3.5 inches  Needle insertion depth: 6 cm  Catheter type: Big Live  Catheter size: 19 G  Catheter at skin depth: 10 cm  Insertion Attempts: 2  Test dose: 3 mL of lidocaine 1.5% with Epi 1-to-200,000  Additional Documentation: incremental injection, negative aspiration for heme and CSF, no paresthesia on injection, no signs/symptoms of IV or SA injection, no significant pain on injection and no significant complaints from patient  Needle localization: anatomical landmarks  Medications:  Volume per aspiration: 5 mL  Time between aspirations: 5 minutes   Assessment  Ease of block: easy  Patient's tolerance of the procedure: comfortable throughout block and no complaints  Additional Notes  Initial attempt at L4-5 with multiple encounters with bone. Second attempt at L3-4 successful. No  inadvertent dural puncture with Tuohy.  Dural puncture performed with spinal needle.

## 2025-06-15 PROBLEM — Z98.891 STATUS POST REPEAT LOW TRANSVERSE CESAREAN SECTION: Status: ACTIVE | Noted: 2025-06-15

## 2025-06-15 PROBLEM — Z34.90 ENCOUNTER FOR INDUCTION OF LABOR: Status: RESOLVED | Noted: 2025-06-13 | Resolved: 2025-06-15

## 2025-06-15 PROBLEM — Z98.891 S/P CESAREAN SECTION: Status: ACTIVE | Noted: 2025-06-15

## 2025-06-15 PROCEDURE — 63600175 PHARM REV CODE 636 W HCPCS: Performed by: STUDENT IN AN ORGANIZED HEALTH CARE EDUCATION/TRAINING PROGRAM

## 2025-06-15 PROCEDURE — 36004725 HC OB OR TIME LEV III - EA ADD 15 MIN: Performed by: STUDENT IN AN ORGANIZED HEALTH CARE EDUCATION/TRAINING PROGRAM

## 2025-06-15 PROCEDURE — 63600175 PHARM REV CODE 636 W HCPCS: Mod: JZ,TB

## 2025-06-15 PROCEDURE — 63600175 PHARM REV CODE 636 W HCPCS

## 2025-06-15 PROCEDURE — 37000009 HC ANESTHESIA EA ADD 15 MINS: Performed by: STUDENT IN AN ORGANIZED HEALTH CARE EDUCATION/TRAINING PROGRAM

## 2025-06-15 PROCEDURE — 72100003 HC LABOR CARE, EA. ADDL. 8 HRS: Performed by: STUDENT IN AN ORGANIZED HEALTH CARE EDUCATION/TRAINING PROGRAM

## 2025-06-15 PROCEDURE — 11000001 HC ACUTE MED/SURG PRIVATE ROOM

## 2025-06-15 PROCEDURE — 25000003 PHARM REV CODE 250: Performed by: STUDENT IN AN ORGANIZED HEALTH CARE EDUCATION/TRAINING PROGRAM

## 2025-06-15 PROCEDURE — 71000039 HC RECOVERY, EACH ADD'L HOUR: Performed by: STUDENT IN AN ORGANIZED HEALTH CARE EDUCATION/TRAINING PROGRAM

## 2025-06-15 PROCEDURE — 37000008 HC ANESTHESIA 1ST 15 MINUTES: Performed by: STUDENT IN AN ORGANIZED HEALTH CARE EDUCATION/TRAINING PROGRAM

## 2025-06-15 PROCEDURE — 27000181 HC CABLE, IUPC: Performed by: STUDENT IN AN ORGANIZED HEALTH CARE EDUCATION/TRAINING PROGRAM

## 2025-06-15 PROCEDURE — 36004724 HC OB OR TIME LEV III - 1ST 15 MIN: Performed by: STUDENT IN AN ORGANIZED HEALTH CARE EDUCATION/TRAINING PROGRAM

## 2025-06-15 PROCEDURE — 27201108 HC SURGICEL

## 2025-06-15 PROCEDURE — 25000003 PHARM REV CODE 250

## 2025-06-15 PROCEDURE — 71000033 HC RECOVERY, INTIAL HOUR: Performed by: STUDENT IN AN ORGANIZED HEALTH CARE EDUCATION/TRAINING PROGRAM

## 2025-06-15 PROCEDURE — 59510 CESAREAN DELIVERY: CPT | Mod: GB,,, | Performed by: STUDENT IN AN ORGANIZED HEALTH CARE EDUCATION/TRAINING PROGRAM

## 2025-06-15 RX ORDER — AZITHROMYCIN MONOHYDRATE 500 MG/5ML
INJECTION, POWDER, LYOPHILIZED, FOR SOLUTION INTRAVENOUS
Status: DISPENSED
Start: 2025-06-15 | End: 2025-06-15

## 2025-06-15 RX ORDER — MORPHINE SULFATE 0.5 MG/ML
INJECTION, SOLUTION EPIDURAL; INTRATHECAL; INTRAVENOUS
Status: DISCONTINUED | OUTPATIENT
Start: 2025-06-15 | End: 2025-06-15

## 2025-06-15 RX ORDER — TRANEXAMIC ACID 10 MG/ML
1000 INJECTION, SOLUTION INTRAVENOUS EVERY 30 MIN PRN
Status: DISCONTINUED | OUTPATIENT
Start: 2025-06-15 | End: 2025-06-18 | Stop reason: HOSPADM

## 2025-06-15 RX ORDER — ONDANSETRON HYDROCHLORIDE 2 MG/ML
INJECTION, SOLUTION INTRAMUSCULAR; INTRAVENOUS
Status: DISCONTINUED | OUTPATIENT
Start: 2025-06-15 | End: 2025-06-15

## 2025-06-15 RX ORDER — DEXMEDETOMIDINE HYDROCHLORIDE 100 UG/ML
INJECTION, SOLUTION INTRAVENOUS
Status: DISCONTINUED | OUTPATIENT
Start: 2025-06-15 | End: 2025-06-15

## 2025-06-15 RX ORDER — OXYCODONE HYDROCHLORIDE 10 MG/1
10 TABLET ORAL EVERY 6 HOURS PRN
Status: DISCONTINUED | OUTPATIENT
Start: 2025-06-16 | End: 2025-06-17

## 2025-06-15 RX ORDER — FENTANYL CITRATE 50 UG/ML
INJECTION, SOLUTION INTRAMUSCULAR; INTRAVENOUS
Status: DISCONTINUED | OUTPATIENT
Start: 2025-06-15 | End: 2025-06-15

## 2025-06-15 RX ORDER — ACETAMINOPHEN 10 MG/ML
INJECTION, SOLUTION INTRAVENOUS
Status: DISCONTINUED | OUTPATIENT
Start: 2025-06-15 | End: 2025-06-15

## 2025-06-15 RX ORDER — MISOPROSTOL 200 UG/1
800 TABLET ORAL ONCE AS NEEDED
Status: DISCONTINUED | OUTPATIENT
Start: 2025-06-15 | End: 2025-06-18 | Stop reason: HOSPADM

## 2025-06-15 RX ORDER — OXYCODONE HYDROCHLORIDE 5 MG/1
5 TABLET ORAL EVERY 4 HOURS PRN
Status: ACTIVE | OUTPATIENT
Start: 2025-06-15 | End: 2025-06-16

## 2025-06-15 RX ORDER — KETOROLAC TROMETHAMINE 30 MG/ML
30 INJECTION, SOLUTION INTRAMUSCULAR; INTRAVENOUS EVERY 6 HOURS
Status: DISCONTINUED | OUTPATIENT
Start: 2025-06-15 | End: 2025-06-15 | Stop reason: SDUPTHER

## 2025-06-15 RX ORDER — OXYTOCIN-SODIUM CHLORIDE 0.9% IV SOLN 30 UNIT/500ML 30-0.9/5 UT/ML-%
95 SOLUTION INTRAVENOUS ONCE AS NEEDED
Status: DISCONTINUED | OUTPATIENT
Start: 2025-06-15 | End: 2025-06-18 | Stop reason: HOSPADM

## 2025-06-15 RX ORDER — ONDANSETRON 8 MG/1
8 TABLET, ORALLY DISINTEGRATING ORAL EVERY 8 HOURS PRN
Status: DISCONTINUED | OUTPATIENT
Start: 2025-06-15 | End: 2025-06-18 | Stop reason: HOSPADM

## 2025-06-15 RX ORDER — IBUPROFEN 400 MG/1
800 TABLET, FILM COATED ORAL EVERY 8 HOURS
Status: DISCONTINUED | OUTPATIENT
Start: 2025-06-16 | End: 2025-06-18 | Stop reason: HOSPADM

## 2025-06-15 RX ORDER — SIMETHICONE 80 MG
1 TABLET,CHEWABLE ORAL EVERY 6 HOURS PRN
Status: DISCONTINUED | OUTPATIENT
Start: 2025-06-15 | End: 2025-06-18

## 2025-06-15 RX ORDER — KETOROLAC TROMETHAMINE 30 MG/ML
INJECTION, SOLUTION INTRAMUSCULAR; INTRAVENOUS
Status: DISCONTINUED | OUTPATIENT
Start: 2025-06-15 | End: 2025-06-15

## 2025-06-15 RX ORDER — DIPHENHYDRAMINE HYDROCHLORIDE 50 MG/ML
INJECTION, SOLUTION INTRAMUSCULAR; INTRAVENOUS
Status: DISCONTINUED | OUTPATIENT
Start: 2025-06-15 | End: 2025-06-15

## 2025-06-15 RX ORDER — BISACODYL 10 MG/1
10 SUPPOSITORY RECTAL ONCE AS NEEDED
Status: DISCONTINUED | OUTPATIENT
Start: 2025-06-15 | End: 2025-06-18 | Stop reason: HOSPADM

## 2025-06-15 RX ORDER — DOCUSATE SODIUM 100 MG/1
200 CAPSULE, LIQUID FILLED ORAL 2 TIMES DAILY
Status: DISCONTINUED | OUTPATIENT
Start: 2025-06-15 | End: 2025-06-18 | Stop reason: HOSPADM

## 2025-06-15 RX ORDER — KETOROLAC TROMETHAMINE 30 MG/ML
30 INJECTION, SOLUTION INTRAMUSCULAR; INTRAVENOUS EVERY 6 HOURS
Status: COMPLETED | OUTPATIENT
Start: 2025-06-15 | End: 2025-06-16

## 2025-06-15 RX ORDER — OXYCODONE HYDROCHLORIDE 5 MG/1
5 TABLET ORAL EVERY 6 HOURS PRN
Status: DISCONTINUED | OUTPATIENT
Start: 2025-06-16 | End: 2025-06-17

## 2025-06-15 RX ORDER — TRANEXAMIC ACID 1 G/10ML
INJECTION, SOLUTION INTRAVENOUS
Status: DISCONTINUED | OUTPATIENT
Start: 2025-06-15 | End: 2025-06-15

## 2025-06-15 RX ORDER — PRENATAL WITH FERROUS FUM AND FOLIC ACID 3080; 920; 120; 400; 22; 1.84; 3; 20; 10; 1; 12; 200; 27; 25; 2 [IU]/1; [IU]/1; MG/1; [IU]/1; MG/1; MG/1; MG/1; MG/1; MG/1; MG/1; UG/1; MG/1; MG/1; MG/1; MG/1
1 TABLET ORAL DAILY
Status: DISCONTINUED | OUTPATIENT
Start: 2025-06-15 | End: 2025-06-18 | Stop reason: HOSPADM

## 2025-06-15 RX ORDER — CARBOPROST TROMETHAMINE 250 UG/ML
250 INJECTION, SOLUTION INTRAMUSCULAR
Status: DISCONTINUED | OUTPATIENT
Start: 2025-06-15 | End: 2025-06-18 | Stop reason: HOSPADM

## 2025-06-15 RX ORDER — OXYCODONE HYDROCHLORIDE 10 MG/1
10 TABLET ORAL EVERY 4 HOURS PRN
Status: ACTIVE | OUTPATIENT
Start: 2025-06-15 | End: 2025-06-16

## 2025-06-15 RX ORDER — SODIUM CHLORIDE 0.9 % (FLUSH) 0.9 %
10 SYRINGE (ML) INJECTION
Status: DISCONTINUED | OUTPATIENT
Start: 2025-06-15 | End: 2025-06-18 | Stop reason: HOSPADM

## 2025-06-15 RX ORDER — DIPHENOXYLATE HYDROCHLORIDE AND ATROPINE SULFATE 2.5; .025 MG/1; MG/1
2 TABLET ORAL EVERY 6 HOURS PRN
Status: DISCONTINUED | OUTPATIENT
Start: 2025-06-15 | End: 2025-06-18 | Stop reason: HOSPADM

## 2025-06-15 RX ORDER — ACETAMINOPHEN 325 MG/1
650 TABLET ORAL EVERY 6 HOURS
Status: DISCONTINUED | OUTPATIENT
Start: 2025-06-16 | End: 2025-06-18 | Stop reason: HOSPADM

## 2025-06-15 RX ORDER — DIPHENHYDRAMINE HCL 25 MG
25 CAPSULE ORAL EVERY 4 HOURS PRN
Status: DISCONTINUED | OUTPATIENT
Start: 2025-06-15 | End: 2025-06-18 | Stop reason: HOSPADM

## 2025-06-15 RX ORDER — OXYTOCIN 10 [USP'U]/ML
10 INJECTION, SOLUTION INTRAMUSCULAR; INTRAVENOUS ONCE AS NEEDED
Status: DISCONTINUED | OUTPATIENT
Start: 2025-06-15 | End: 2025-06-18 | Stop reason: HOSPADM

## 2025-06-15 RX ORDER — PROCHLORPERAZINE EDISYLATE 5 MG/ML
5 INJECTION INTRAMUSCULAR; INTRAVENOUS EVERY 6 HOURS PRN
Status: DISCONTINUED | OUTPATIENT
Start: 2025-06-15 | End: 2025-06-18 | Stop reason: HOSPADM

## 2025-06-15 RX ORDER — LIDOCAINE HYDROCHLORIDE 20 MG/ML
INJECTION, SOLUTION EPIDURAL; INFILTRATION; INTRACAUDAL; PERINEURAL
Status: DISCONTINUED | OUTPATIENT
Start: 2025-06-15 | End: 2025-06-15

## 2025-06-15 RX ORDER — ONDANSETRON HYDROCHLORIDE 2 MG/ML
4 INJECTION, SOLUTION INTRAVENOUS EVERY 6 HOURS PRN
Status: ACTIVE | OUTPATIENT
Start: 2025-06-15 | End: 2025-06-16

## 2025-06-15 RX ORDER — OXYTOCIN-SODIUM CHLORIDE 0.9% IV SOLN 30 UNIT/500ML 30-0.9/5 UT/ML-%
95 SOLUTION INTRAVENOUS CONTINUOUS PRN
Status: DISCONTINUED | OUTPATIENT
Start: 2025-06-15 | End: 2025-06-16

## 2025-06-15 RX ORDER — METHYLERGONOVINE MALEATE 0.2 MG/ML
INJECTION INTRAVENOUS
Status: DISCONTINUED | OUTPATIENT
Start: 2025-06-15 | End: 2025-06-15

## 2025-06-15 RX ORDER — ADHESIVE BANDAGE
30 BANDAGE TOPICAL 2 TIMES DAILY PRN
Status: DISCONTINUED | OUTPATIENT
Start: 2025-06-16 | End: 2025-06-18 | Stop reason: HOSPADM

## 2025-06-15 RX ORDER — ACETAMINOPHEN 325 MG/1
650 TABLET ORAL EVERY 6 HOURS
Status: DISPENSED | OUTPATIENT
Start: 2025-06-15 | End: 2025-06-16

## 2025-06-15 RX ORDER — PROCHLORPERAZINE EDISYLATE 5 MG/ML
INJECTION INTRAMUSCULAR; INTRAVENOUS
Status: DISCONTINUED | OUTPATIENT
Start: 2025-06-15 | End: 2025-06-15

## 2025-06-15 RX ORDER — CHLOROPROCAINE HYDROCHLORIDE 30 MG/ML
INJECTION, SOLUTION EPIDURAL; INFILTRATION; INTRACAUDAL; PERINEURAL
Status: DISCONTINUED | OUTPATIENT
Start: 2025-06-15 | End: 2025-06-15

## 2025-06-15 RX ORDER — AMOXICILLIN 250 MG
1 CAPSULE ORAL NIGHTLY PRN
Status: DISCONTINUED | OUTPATIENT
Start: 2025-06-15 | End: 2025-06-18 | Stop reason: HOSPADM

## 2025-06-15 RX ORDER — OXYTOCIN-SODIUM CHLORIDE 0.9% IV SOLN 30 UNIT/500ML 30-0.9/5 UT/ML-%
10 SOLUTION INTRAVENOUS ONCE AS NEEDED
Status: DISCONTINUED | OUTPATIENT
Start: 2025-06-15 | End: 2025-06-18 | Stop reason: HOSPADM

## 2025-06-15 RX ORDER — METHYLERGONOVINE MALEATE 0.2 MG/ML
200 INJECTION INTRAVENOUS ONCE AS NEEDED
Status: DISCONTINUED | OUTPATIENT
Start: 2025-06-15 | End: 2025-06-18 | Stop reason: HOSPADM

## 2025-06-15 RX ORDER — DEXAMETHASONE SODIUM PHOSPHATE 4 MG/ML
INJECTION, SOLUTION INTRA-ARTICULAR; INTRALESIONAL; INTRAMUSCULAR; INTRAVENOUS; SOFT TISSUE
Status: DISCONTINUED | OUTPATIENT
Start: 2025-06-15 | End: 2025-06-15

## 2025-06-15 RX ADMIN — DEXAMETHASONE SODIUM PHOSPHATE 4 MG: 4 INJECTION INTRA-ARTICULAR; INTRALESIONAL; INTRAMUSCULAR; INTRAVENOUS; SOFT TISSUE at 02:06

## 2025-06-15 RX ADMIN — MORPHINE SULFATE 1 MG: 0.5 INJECTION, SOLUTION EPIDURAL; INTRATHECAL; INTRAVENOUS at 03:06

## 2025-06-15 RX ADMIN — PRENATAL VIT W/ FE FUMARATE-FA TAB 27-0.8 MG 1 TABLET: 27-0.8 TAB at 08:06

## 2025-06-15 RX ADMIN — DEXMEDETOMIDINE 8 MCG: 200 INJECTION, SOLUTION INTRAVENOUS at 02:06

## 2025-06-15 RX ADMIN — PROCHLORPERAZINE EDISYLATE 2.5 MG: 5 INJECTION INTRAMUSCULAR; INTRAVENOUS at 02:06

## 2025-06-15 RX ADMIN — ACETAMINOPHEN 650 MG: 325 TABLET, FILM COATED ORAL at 04:06

## 2025-06-15 RX ADMIN — DIPHENHYDRAMINE HYDROCHLORIDE 12.5 MG: 50 INJECTION INTRAMUSCULAR; INTRAVENOUS at 02:06

## 2025-06-15 RX ADMIN — ONDANSETRON 8 MG: 8 TABLET, ORALLY DISINTEGRATING ORAL at 01:06

## 2025-06-15 RX ADMIN — DIPHENHYDRAMINE HYDROCHLORIDE 25 MG: 25 CAPSULE ORAL at 03:06

## 2025-06-15 RX ADMIN — AZITHROMYCIN MONOHYDRATE 500 MG: 500 INJECTION, POWDER, LYOPHILIZED, FOR SOLUTION INTRAVENOUS at 02:06

## 2025-06-15 RX ADMIN — ONDANSETRON 4 MG: 2 INJECTION INTRAMUSCULAR; INTRAVENOUS at 02:06

## 2025-06-15 RX ADMIN — ACETAMINOPHEN 650 MG: 325 TABLET, FILM COATED ORAL at 10:06

## 2025-06-15 RX ADMIN — ACETAMINOPHEN 650 MG: 325 TABLET, FILM COATED ORAL at 11:06

## 2025-06-15 RX ADMIN — FENTANYL CITRATE 100 MCG: 50 INJECTION, SOLUTION INTRAMUSCULAR; INTRAVENOUS at 02:06

## 2025-06-15 RX ADMIN — ACETAMINOPHEN 1000 MG: 10 INJECTION, SOLUTION INTRAVENOUS at 02:06

## 2025-06-15 RX ADMIN — DEXMEDETOMIDINE 4 MCG: 200 INJECTION, SOLUTION INTRAVENOUS at 02:06

## 2025-06-15 RX ADMIN — SODIUM CITRATE AND CITRIC ACID MONOHYDRATE 30 ML: 334; 500 SOLUTION ORAL at 02:06

## 2025-06-15 RX ADMIN — KETOROLAC TROMETHAMINE 30 MG: 30 INJECTION, SOLUTION INTRAMUSCULAR; INTRAVENOUS at 08:06

## 2025-06-15 RX ADMIN — FAMOTIDINE 20 MG: 10 INJECTION, SOLUTION INTRAVENOUS at 02:06

## 2025-06-15 RX ADMIN — DOCUSATE SODIUM 200 MG: 100 CAPSULE, LIQUID FILLED ORAL at 09:06

## 2025-06-15 RX ADMIN — CHLOROPROCAINE HYDROCHLORIDE 10 ML: 30 INJECTION, SOLUTION EPIDURAL; INFILTRATION; INTRACAUDAL; PERINEURAL at 02:06

## 2025-06-15 RX ADMIN — LIDOCAINE HYDROCHLORIDE 10 ML: 20 INJECTION, SOLUTION EPIDURAL; INFILTRATION; INTRACAUDAL; PERINEURAL at 02:06

## 2025-06-15 RX ADMIN — DOCUSATE SODIUM 200 MG: 100 CAPSULE, LIQUID FILLED ORAL at 08:06

## 2025-06-15 RX ADMIN — KETOROLAC TROMETHAMINE 30 MG: 30 INJECTION, SOLUTION INTRAMUSCULAR; INTRAVENOUS at 02:06

## 2025-06-15 RX ADMIN — TRANEXAMIC ACID 1000 MG: 100 INJECTION, SOLUTION INTRAVENOUS at 02:06

## 2025-06-15 RX ADMIN — LIDOCAINE HYDROCHLORIDE 2 ML: 20 INJECTION, SOLUTION EPIDURAL; INFILTRATION; INTRACAUDAL; PERINEURAL at 02:06

## 2025-06-15 RX ADMIN — METHYLERGONOVINE MALEATE 200 MCG: 0.2 INJECTION, SOLUTION INTRAMUSCULAR; INTRAVENOUS at 02:06

## 2025-06-15 RX ADMIN — ACETAMINOPHEN 650 MG: 325 TABLET, FILM COATED ORAL at 03:06

## 2025-06-15 RX ADMIN — CEFAZOLIN 2 G: 330 INJECTION, POWDER, FOR SOLUTION INTRAMUSCULAR; INTRAVENOUS at 02:06

## 2025-06-15 RX ADMIN — KETOROLAC TROMETHAMINE 30 MG: 30 INJECTION, SOLUTION INTRAMUSCULAR; INTRAVENOUS at 03:06

## 2025-06-15 RX ADMIN — KETOROLAC TROMETHAMINE 30 MG: 30 INJECTION, SOLUTION INTRAMUSCULAR; INTRAVENOUS at 09:06

## 2025-06-15 NOTE — PROGRESS NOTES
"LABOR NOTE    S:  Complaints: No.  Epidural Working:  yes   Resident to bedside for routine cervical check     O: BP (!) 109/58   Pulse 104   Temp 98.3 °F (36.8 °C) (Oral)   Resp 18   Ht 5' 6" (1.676 m)   Wt 83.9 kg (185 lb)   LMP 09/05/2024 (Exact Date)   SpO2 96%   Breastfeeding No   BMI 29.86 kg/m²     FHT: 145 bpm, mod variability, + accels, + occasional variable/late decelerations; Cat 2 overall reassuring and appropriate for gestational age   CTX: 4-5 minutes, pit at 5 mU/min   SVE: 7/80/-1, cervix stretchy with contraction     TIMELINE:   1630: 2/40/-3  2045: 2/40/-3, pit decreased 8> 6 with return of FHT to baseline   0045: 2/50/-3, carranza balloon attempted but not tolerated, pitocin continued   0640: 3/50/-3, pit @ 10, declines AROM   1100: 4/50/-2,   1500: 5/80/-2, pit @ 10, declines AROM   1900: 6/80/-2, SROM'd @ 1830. No bag palpated. Pit @ 10.  2100: 6/80/-2, pit @ 10. IUPC placed  2300: 6/80/-2, pit @ 10 > turned down to 5  0100: 7/80/-1, pit at 5 mU/min     PLAN:    Continue Close Maternal/Fetal Monitoring  Pitocin Augmentation per protocol  Discussed normal labor curve with patient once active labor is achieved. Reviewed fetal tracing with patient and overall reassuring; however, at times with decelerations. Will continue to closely monitor.   Recheck 2-4 hours or PRN    Twyla Soliman MD/MPH  OB/GYN PGY-4  [facility]          "

## 2025-06-15 NOTE — TRANSFER OF CARE
"Anesthesia Transfer of Care Note    Patient: Ray Ugarte    Procedure(s) Performed: Procedure(s) (LRB):   SECTION (N/A)    Patient location: PACU    Anesthesia Type: epidural    Transport from OR: Transported from OR on room air with adequate spontaneous ventilation    Post pain: adequate analgesia    Post assessment: no apparent anesthetic complications and tolerated procedure well    Post vital signs: stable    Level of consciousness: awake and alert    Nausea/Vomiting: no nausea/vomiting    Complications: none    Transfer of care protocol was followed      Last vitals: Visit Vitals  /70   Pulse 105   Temp 37.2 °C (99 °F) (Axillary)   Resp 18   Ht 5' 6" (1.676 m)   Wt 83.9 kg (185 lb)   LMP 2024 (Exact Date)   SpO2 100%   Breastfeeding No   BMI 29.86 kg/m²     "

## 2025-06-15 NOTE — LACTATION NOTE
06/15/25 1710   Maternal Assessment   Breast Shape Bilateral:;round   Breast Density Bilateral:;soft   Areola Bilateral:;elastic   Nipples Bilateral:;everted   Maternal Infant Feeding   Maternal Emotional State assist needed   Infant Positioning clutch/football;cross-cradle   Signs of Milk Transfer infant jaw motion present   Latch Assistance yes   Equipment Type   Breast Pump Type double electric, hospital grade     Assisted pt with position and latch. Baby latched to both breast with minimal assistance. Good tugs and pulls observed. Pt shown how to keep baby actively sucking using breast compression and stimulation . Skin to skin encouraged. Basic breast education provided. Questions answered.

## 2025-06-15 NOTE — CARE UPDATE
Fetal heart tones noted to be 70s for 3 minutes, pitocin at 5 mU/min, paused. IVF bolus ongoing. SVE unchanged. FSE placed due to difficulty with fetal monitoring. Fetal heart tones returned to baseline with maternal repositioning; however, late deceleration noted. Recommended  section in the setting of non reassuring fetal heart tones.. Discussed risks, benefits, and alternatives.     Twyla Soliman MD/MPH  OB/GYN PGY-4  [facility]

## 2025-06-15 NOTE — PROGRESS NOTES
"LABOR NOTE    S:  Complaints: No.  Epidural Working:  yes   Resident to bedside for routine cervical check     O: /71   Pulse 84   Temp 98 °F (36.7 °C) (Oral)   Resp 16   Ht 5' 6" (1.676 m)   Wt 83.9 kg (185 lb)   LMP 09/05/2024 (Exact Date)   SpO2 98%   Breastfeeding No   BMI 29.86 kg/m²     FHT: 145 BPM, moderate variability, + accels, +intermittent early decels Cat 1 (reassuring)  CTX: difficult to discern, pit @ 10  SVE: 6/80/-2, SROM'd @ 1830    TIMELINE:   1630: 2/40/-3  2045: 2/40/-3, pit decreased 8> 6 with return of FHT to baseline   0045: 2/50/-3, carranza balloon attempted but not tolerated, pitocin continued   0640: 3/50/-3, pit @ 10, declines AROM   1100: 4/50/-2,   1500: 5/80/-2, pit @ 10, declines AROM   1900: 6/80/-2, SROM'd @ 1830. No bag palpated. Pit @ 10.    PLAN:    Continue Close Maternal/Fetal Monitoring  Pitocin Augmentation per protocol  Recheck 2-4 hours or PRN    Dary Fernandez MD  OBGYN   PGY-1        "

## 2025-06-15 NOTE — CARE UPDATE
Resident to bedside to discuss labor curve.     SVE: /-2 from [id number]  FHT Cat I  [place] q4, pit at 5    Counseled patient on criteria for arrest of dilation. Discussed possibility of recommendation to proceed with  if unchanged at next check. Question answered. Patient understanding.     Dary Fernandez MD  OBGYN   PGY-1

## 2025-06-15 NOTE — PROGRESS NOTES
"LABOR NOTE    S:  Complaints: No.  Epidural Working:  yes   Resident to bedside for routine cervical check     O: /72   Pulse 95   Temp 98.6 °F (37 °C) (Axillary)   Resp 16   Ht 5' 6" (1.676 m)   Wt 83.9 kg (185 lb)   LMP 09/05/2024 (Exact Date)   SpO2 96%   Breastfeeding No   BMI 29.86 kg/m²     FHT: 145 BPM, moderate variability, + accels, +intermittent late decels Cat 2 (overall reassuring)  CTX: difficult to discern, IUPC placed. Pit @ 10 > turned down in 5  SVE: 6/80/-2    TIMELINE:   1630: 2/40/-3  2045: 2/40/-3, pit decreased 8> 6 with return of FHT to baseline   0045: 2/50/-3, carranza balloon attempted but not tolerated, pitocin continued   0640: 3/50/-3, pit @ 10, declines AROM   1100: 4/50/-2,   1500: 5/80/-2, pit @ 10, declines AROM   1900: 6/80/-2, SROM'd @ 1830. No bag palpated. Pit @ 10.  2100: 6/80/-2, pit @ 10. IUPC placed  2300: 6/80/-2, pit @ 10 > turned down to 5    PLAN:    Continue Close Maternal/Fetal Monitoring  Pitocin Augmentation per protocol  Recheck 2-4 hours or PRN    Dary Fernandez MD  OBGYN   PGY-1        "

## 2025-06-15 NOTE — PROGRESS NOTES
"LABOR NOTE    S:  Complaints: No.  Epidural Working:  yes   Resident to bedside for routine cervical check     O: /68   Pulse 94   Temp 97.9 °F (36.6 °C) (Oral)   Resp 16   Ht 5' 6" (1.676 m)   Wt 83.9 kg (185 lb)   LMP 09/05/2024 (Exact Date)   SpO2 96%   Breastfeeding No   BMI 29.86 kg/m²     FHT: 145 BPM, moderate variability, + accels, +intermittent variable decels Cat 2 (overall reassuring)  CTX: difficult to discern, IUPC placed. Pit @ 10  SVE: 6/80/-2    TIMELINE:   1630: 2/40/-3  2045: 2/40/-3, pit decreased 8> 6 with return of FHT to baseline   0045: 2/50/-3, carranza balloon attempted but not tolerated, pitocin continued   0640: 3/50/-3, pit @ 10, declines AROM   1100: 4/50/-2,   1500: 5/80/-2, pit @ 10, declines AROM   1900: 6/80/-2, SROM'd @ 1830. No bag palpated. Pit @ 10.  2100: 6/80/-2, pit @ 10. IUPC placed    PLAN:    Continue Close Maternal/Fetal Monitoring  Pitocin Augmentation per protocol  Recheck 2-4 hours or PRN    Dary Fernandez MD  OBGYN   PGY-1        "

## 2025-06-15 NOTE — L&D DELIVERY NOTE
[facility] - Labor & Delivery   Section   Operative Note    SUMMARY   Surgery Date: 6/15/2025     Procedure:   1. Low transverse  Section via pfannenstiel skin incision    Indications:   1. non-reassuring fetal status    Pre-operative Diagnosis:   1. IUP at 40 week 3 day pregnancy  2. AMA  3. H/O C/section x 1  4. Prior  delivery  5. Prior vaginal birth after     Post-operative Diagnosis:   1. Same  2. S/p low transverse  section, repeat    Surgeons and Role:     * Olivier Cruz MD - Primary     * Shabana Marquez MD - Resident - Assisting    Anesthesia: Spinal/Epidural    Findings:    1. Single viable  male infant, with Apgar scores 7/9, weight 3350 g.   2. Unable to exteriorize uterus 2/2 adhesive disease. Bladder adhered to lower uterine segment and sharply dissected off of uterus. Extension into left uterine artery requiring additional figure of eight of chromic. Some oozing noted over hysterotomy. Surgical applied to hysterotomy with hemostasis achieved. Methergine and TXA x 1 given.   3. Normal placenta.  4. Excellent hemostasis following closure of each tissue layer     Estimated Blood Loss:  865 mL           Total IV Fluids: Per anesthesia records      UOP: Per anesthesia records     Specimens:   1. Single viable male infant  2. Placenta, which was discarded                   Procedure Details   The patient was seen in her labor and delivery room. Diagnosis of NRFHTs was discussed. The risks, benefits, complications, treatment options, and expected outcomes were discussed with the patient. The patient concurred with the proposed plan, giving informed consent. The patient was taken to Operating Room, identified as Ray Ugarte and the procedure verified as  Delivery. A Time Out was held and the above information confirmed.    After induction of anesthesia, the patient was prepped and draped in the usual sterile manner while placed in a dorsal  supine position with a left lateral tilt. A carranza catheter was also placed per nursing. SCDs were on and cycling. Preoperative antibiotics Ancef 2 g and azithromycin 500 mg were administered. An allis test was performed confirming adequate anesthesia. A Pfannenstiel incision was made and carried down through the subcutaneous tissue to the fascia. Fascial incision was made and extended transversely. The fascia was grasped with kocher clamps and  from the underlying rectus tissue superiorly and inferiorly. The peritoneum was identified, found to be free of adherent bowel, and entered sharply. Peritoneal incision was extended longitudinally. The bladder was noted to be adhered to lower uterine segment and dissected off sharply. The bladder blade was inserted to keep the bladder out of the operative field. A low transverse uterine incision was made with knife and extended with cephalocaudal traction. The amniotic sac was already ruptured and the infant was noted to be in vertex presentation. The fetal head was brought to the incision and elevated out of the pelvis. The patient delivered a single viable female infant without difficulty.  Infant weighed 3350 grams with Apgar scores of 7/9 at one and five minutes respectively. The umbilical cord was clamped and cut. The placenta was removed intact, appeared normal, and was discarded. Adhesions were noted of the anterior abdominal wall to the anterior portion of the uterus and the uterus was unable to be exteriorized. An extension was noted of the left hysterotomy into the uterine artery. 1g of TXA and 0.2 mg of Methergine was administered. The uterine incision was closed with running locked sutures of 1 Chromic. Bleeding was still noted at the left hysterotomy. A figure of eight was placed on the area. Small amount of serosal oozing was noted and Surgicel was applied to hysterotomy. Hemostasis was observed. The uterus was returned to the abdominal cavity. Incision  "was reinspected and good hemostasis was noted. The subfascial space was inspected and excellent hemostasis achieved. The fascia was then reapproximated with running sutures of 1 PDS. The subcutaneous fat was reapproximated with 2-0 Vicryl, and skin was reapproximated with 4-0 monocryl.    Instrument, sponge, and needle counts were correct prior the abdominal closure and at the conclusion of the case.     Pt tolerated procedure well and was in stable condition after the procedure.    Shabana Marquez MD  Obstetrics and Gynecology, PGY-2    Delivery Information for Tyron Ugarte    Birth information:  YOB: 2025   Time of birth: 2:47 AM   Sex: male   Head Delivery Date/Time: 6/15/2025  2:47 AM   Delivery type: , Low Transverse   Gestational Age: 40w3d       Delivery Providers    Delivering clinician: Olivier Cruz MD   Provider Role    Shabana Marquez MD Myles, Christina, Dian Hale RN Hilkirk, Jennifer, RN Gonzalez, Joseph, ST               Measurements    Weight: 3350 g  Weight (lbs): 7 lb 6.2 oz  Length: 51.9 cm  Length (in): 20.43"         Apgars    Living status: Living  Apgar Component Scores:  1 min.:  5 min.:  10 min.:  15 min.:  20 min.:    Skin color:  0  1       Heart rate:  2  2       Reflex irritability:  2  2       Muscle tone:  1  2       Respiratory effort:  2  2       Total:  7  9       Apgars assigned by: SHANNON VICKERS RN         Operative Delivery    Forceps attempted?: No  Vacuum extractor attempted?: No         Shoulder Dystocia    Shoulder dystocia present?: No           Presentation    Presentation: Vertex  Position: Occiput Anterior           Interventions/Resuscitation    Method: Bulb Suctioning, Blow By Oxygen, Tactile Stimulation, Deep Suctioning, CPAP       Cord    Vessels: 3 vessels  Complications: None  Delayed Cord Clamping?: Yes  Cord Clamped Date/Time: 6/15/2025  2:48 AM  Cord Blood Disposition: Sent with Baby  Gases Sent?: No  Stem " Cell Collection (by MD): No       Placenta    Placenta delivery date/time: 6/15/2025 02:48  Placenta removal: Manual removal  Placenta appearance: Intact  Placenta disposition: Discarded           Labor Events:       labor:       Labor Onset Date/Time:         Dilation Complete Date/Time:         Start Pushing Date/Time:         Start Pushing Date/Time:       Rupture Date/Time: 25 1830        Rupture type: SRM (Spontaneous Rupture)        Fluid Amount:       Fluid Color: Clear              steroids:       Antibiotics given for GBS:       Induction:       Indications for induction:  Elective     Augmentation: oxytocin     Indications for augmentation: Ineffective Contraction Pattern     Labor complications: Fetal Intolerance     Additional complications:          Cervical ripening:                     Delivery:      Episiotomy:       Indication for Episiotomy:       Perineal Lacerations:   Repaired:      Periurethral Laceration:   Repaired:     Labial Laceration:   Repaired:     Sulcus Laceration:   Repaired:     Vaginal Laceration:   Repaired:     Cervical Laceration:   Repaired:     Repair suture:       Repair # of packets:       Last Value - EBL - Nursing (mL):       Sum - EBL - Nursing (mL): 0     Last Value - EBL - Anesthesia (mL):      Calculated QBL (mL): 760     Running total QBL (mL): 865     Vaginal Sweep Performed:       Surgicount Correct:       Vaginal Packing:   Quantity:       Other providers:       Anesthesia    Method: Epidural          Details (if applicable):  Trial of Labor Yes    Categorization: Repeat    Priority: Urgent   Indications for : Fetal heart rate abnormalities   Incision Type: low transverse     Additional  information:  Forceps:    Vacuum:    Breech:    Observed anomalies    Other (Comments):

## 2025-06-15 NOTE — NURSING
Pump, tubing, collections containers and labels brought to bedside.  Discussed proper pump setting of initiation phase.  Instructed on proper usage of pump and to adjust suction according to maximum comfort level.  Verified appropriate flange fit.  Educated on the frequency and duration of pumping in order to promote and maintain a full milk supply.  Instructed on cleaning of breast pump parts.  Written instructions also given.  Pt verbalized understanding and appropriate recall for proper milk handling, collection, labeling, storage and transportation. Pt initiated pumping at this time.

## 2025-06-15 NOTE — BRIEF OP NOTE
[facility] - Labor & Delivery  Brief Operative Note    SUMMARY     Surgery Date: 6/15/2025     Surgeons and Role:     * Olivier Cruz MD - Primary     * Shabana Marquez MD - Resident - Assisting      Pre-op Diagnosis:  Repeat Section  Fetal Intolerance of Labor    Post-op Diagnosis:  Post-Op Diagnosis Codes:     * Pregnant [Z34.90]    Procedure(s) (LRB):   SECTION (N/A)    Anesthesia: Spinal/Epidural    Implants:  * No implants in log *    Operative Findings:   1. Single viable  male infant, with Apgar scores 7/9, weight 3350 g.   2. Unable to exteriorize uterus 2/2 adhesive disease. Bladder adhered to lower uterine segment and sharply dissected off of uterus. Extension into left uterine artery requiring additional figure of eight of chromic. Some oozing noted over hysterotomy. Surgical applied to hysterotomy with hemostasis achieved. Methergine and TXA x 1 given.   3. Normal placenta.  4. Excellent hemostasis following closure of each tissue layer       Estimated Blood Loss: 865 cc  Specimens:   Specimen (24h ago, onward)      None          * No specimens in log *    [id number]

## 2025-06-16 PROBLEM — N91.2 AMENORRHEA: Status: RESOLVED | Noted: 2024-11-19 | Resolved: 2025-06-16

## 2025-06-16 PROBLEM — O44.42 LOW-LYING PLACENTA IN SECOND TRIMESTER: Status: RESOLVED | Noted: 2025-01-10 | Resolved: 2025-06-16

## 2025-06-16 PROBLEM — O09.812 PREGNANCY RESULTING FROM IN VITRO FERTILIZATION IN SECOND TRIMESTER: Status: RESOLVED | Noted: 2024-12-23 | Resolved: 2025-06-16

## 2025-06-16 PROBLEM — D62 ABLA (ACUTE BLOOD LOSS ANEMIA): Status: ACTIVE | Noted: 2025-06-16

## 2025-06-16 PROBLEM — O16.9 ELEVATED BLOOD PRESSURE AFFECTING PREGNANCY, ANTEPARTUM: Status: ACTIVE | Noted: 2025-06-16

## 2025-06-16 PROBLEM — Z87.898 HISTORY OF FEVER: Status: RESOLVED | Noted: 2025-01-10 | Resolved: 2025-06-16

## 2025-06-16 PROBLEM — O09.522 AMA (ADVANCED MATERNAL AGE) MULTIGRAVIDA 35+, SECOND TRIMESTER: Status: RESOLVED | Noted: 2025-01-10 | Resolved: 2025-06-16

## 2025-06-16 LAB
ABSOLUTE EOSINOPHIL (OHS): 0.24 K/UL
ABSOLUTE MONOCYTE (OHS): 1.25 K/UL (ref 0.3–1)
ABSOLUTE NEUTROPHIL COUNT (OHS): 12.63 K/UL (ref 1.8–7.7)
BASOPHILS # BLD AUTO: 0.04 K/UL
BASOPHILS NFR BLD AUTO: 0.2 %
ERYTHROCYTE [DISTWIDTH] IN BLOOD BY AUTOMATED COUNT: 13.6 % (ref 11.5–14.5)
HCT VFR BLD AUTO: 29.1 % (ref 37–48.5)
HGB BLD-MCNC: 9.8 GM/DL (ref 12–16)
IMM GRANULOCYTES # BLD AUTO: 0.15 K/UL (ref 0–0.04)
IMM GRANULOCYTES NFR BLD AUTO: 0.8 % (ref 0–0.5)
LYMPHOCYTES # BLD AUTO: 3.45 K/UL (ref 1–4.8)
MCH RBC QN AUTO: 30.7 PG (ref 27–31)
MCHC RBC AUTO-ENTMCNC: 33.7 G/DL (ref 32–36)
MCV RBC AUTO: 91 FL (ref 82–98)
NUCLEATED RBC (/100WBC) (OHS): 0 /100 WBC
PLATELET # BLD AUTO: 196 K/UL (ref 150–450)
PMV BLD AUTO: 10.6 FL (ref 9.2–12.9)
RBC # BLD AUTO: 3.19 M/UL (ref 4–5.4)
RELATIVE EOSINOPHIL (OHS): 1.4 %
RELATIVE LYMPHOCYTE (OHS): 19.4 % (ref 18–48)
RELATIVE MONOCYTE (OHS): 7 % (ref 4–15)
RELATIVE NEUTROPHIL (OHS): 71.2 % (ref 38–73)
WBC # BLD AUTO: 17.76 K/UL (ref 3.9–12.7)

## 2025-06-16 PROCEDURE — 36415 COLL VENOUS BLD VENIPUNCTURE: CPT

## 2025-06-16 PROCEDURE — 11000001 HC ACUTE MED/SURG PRIVATE ROOM

## 2025-06-16 PROCEDURE — 25000003 PHARM REV CODE 250

## 2025-06-16 PROCEDURE — 85025 COMPLETE CBC W/AUTO DIFF WBC: CPT

## 2025-06-16 PROCEDURE — 25000003 PHARM REV CODE 250: Performed by: STUDENT IN AN ORGANIZED HEALTH CARE EDUCATION/TRAINING PROGRAM

## 2025-06-16 PROCEDURE — 63600175 PHARM REV CODE 636 W HCPCS: Mod: JZ,TB

## 2025-06-16 RX ORDER — LANOLIN ALCOHOL/MO/W.PET/CERES
1 CREAM (GRAM) TOPICAL DAILY
Status: DISCONTINUED | OUTPATIENT
Start: 2025-06-16 | End: 2025-06-18 | Stop reason: HOSPADM

## 2025-06-16 RX ADMIN — ACETAMINOPHEN 650 MG: 325 TABLET, FILM COATED ORAL at 04:06

## 2025-06-16 RX ADMIN — IBUPROFEN 800 MG: 400 TABLET ORAL at 06:06

## 2025-06-16 RX ADMIN — IBUPROFEN 800 MG: 400 TABLET ORAL at 11:06

## 2025-06-16 RX ADMIN — ACETAMINOPHEN 650 MG: 325 TABLET, FILM COATED ORAL at 11:06

## 2025-06-16 RX ADMIN — PRENATAL VIT W/ FE FUMARATE-FA TAB 27-0.8 MG 1 TABLET: 27-0.8 TAB at 08:06

## 2025-06-16 RX ADMIN — FERROUS SULFATE TAB 325 MG (65 MG ELEMENTAL FE) 1 EACH: 325 (65 FE) TAB at 08:06

## 2025-06-16 RX ADMIN — KETOROLAC TROMETHAMINE 30 MG: 30 INJECTION, SOLUTION INTRAMUSCULAR; INTRAVENOUS at 04:06

## 2025-06-16 RX ADMIN — DOCUSATE SODIUM 200 MG: 100 CAPSULE, LIQUID FILLED ORAL at 08:06

## 2025-06-16 RX ADMIN — ACETAMINOPHEN 650 MG: 325 TABLET, FILM COATED ORAL at 05:06

## 2025-06-16 NOTE — PLAN OF CARE
VSS. Uterus firm, midline without massage. Lochia rubra, moderate amount, no odor. Ambulating independently. Voiding without complications. Passing flatus. Incision site clean, dry with hydrocolloid dressing. Pain well managed with meds. Breast feeding and giving donor milk. Bed at lowest position, locked, call light in reach. Instructed pt to monitor bleeding and report any significant changes.

## 2025-06-16 NOTE — ANESTHESIA POSTPROCEDURE EVALUATION
Anesthesia Post Evaluation    Patient: Ray Ugarte    Procedure(s) Performed: Procedure(s) (LRB):   SECTION (N/A)    Final Anesthesia Type: epidural      Patient location during evaluation: floor  Patient participation: Yes- Able to Participate  Level of consciousness: awake and alert and oriented  Post-procedure vital signs: reviewed and stable  Pain management: adequate  Airway patency: patent    PONV status at discharge: No PONV  Anesthetic complications: no      Cardiovascular status: blood pressure returned to baseline  Respiratory status: unassisted  Hydration status: euvolemic                Vitals Value Taken Time   /75 25 08:19   Temp 36.9 °C (98.4 °F) 25 08:19   Pulse 82 25 08:19   Resp 16 25 08:19   SpO2 96 % 25 08:19         Event Time   Out of Recovery 06/15/2025 06:20:00         Pain/Mery Score: Pain Rating Prior to Med Admin: 3 (2025 11:42 AM)  Pain Rating Post Med Admin: 3 (2025  5:30 AM)

## 2025-06-16 NOTE — PROGRESS NOTES
"POSTPARTUM PROGRESS NOTE    Subjective:     PPD/POD#: 1   Procedure: Repeat LTCS (Fetal intolerance, failed TOLAC)    EGA: 40w3d   N/V: No   F/C: No   Abd Pain: Mild, well-controlled with oral pain medication   Lochia: Mild   Voiding: Yes   Ambulating: Yes   Bowel fnc: Yes   Contraception: Per primary OB     Objective:      Temp:  [97.5 °F (36.4 °C)-97.8 °F (36.6 °C)] 97.7 °F (36.5 °C)  Pulse:  [] 76  Resp:  [16-18] 16  SpO2:  [96 %-98 %] 98 %  BP: (101-131)/(61-73) 117/61    Abdomen: Soft, appropriately tender   Uterus: Firm, no fundal tenderness   Incision: Bandage in place without shadowing     Lab Review    No results for input(s): "NA", "K", "CL", "CO2", "BUN", "CREATININE", "GLU", "PROT", "BILITOT", "ALKPHOS", "ALT", "AST", "MG", "PHOS" in the last 168 hours.    Recent Labs   Lab 06/13/25  1629 06/16/25  0551   WBC 12.67 17.76*   HGB 12.7 9.8*   HCT 36.7* 29.1*   MCV 89 91    196         I/O    Intake/Output Summary (Last 24 hours) at 6/16/2025 0649  Last data filed at 6/15/2025 2045  Gross per 24 hour   Intake --   Output 2500 ml   Net -2500 ml        Assessment and Plan:   Postpartum care:  - Patient doing well.  - Continue routine management and advances.    AMA  - PP lovenox not indicated   - PP ambulation encouraged     Elevated BP x1   - BP as above  - asymptomatic  - Mag: not indicated  - Hypertensive agent N/A     ABLA  - H/H as above  - QBL: 865  - asymptomatic  - iron/colace    Contraception  -Per primary OB    Tania Quiroz MD (Mary)   Obstetrics and Gynecology, PGY1    "

## 2025-06-16 NOTE — PHYSICIAN QUERY
Please clarify the diagnosis associated with the documentation of uterine extension.    Unintentional laceration/tear of left uterine atery; repaired with suture ligation and hemostatic upon closure.

## 2025-06-16 NOTE — LACTATION NOTE
Breastfeeding basics and positioning reviewed with pt. Pt encouraged to feed the baby 8 or more times in 24hrs on cue until content, offer supplement if needed after the feeding since the baby was receiving more volume while in NICU,. Pt encouraged to use the Symphony pump if she supplements to protect her milk supply. Reviewed use and cleaning of the pump parts. Pt verbalized understanding. Pt latched the baby to the breast with occasional swallows noted.    06/16/25 1050   Maternal Assessment   Breast Shape Bilateral:;round   Breast Density Bilateral:;soft   Areola Bilateral:;elastic   Nipples Bilateral:;everted   Maternal Infant Feeding   Maternal Emotional State assist needed   Infant Positioning clutch/football   Signs of Milk Transfer audible swallow;infant jaw motion present   Latch Assistance yes

## 2025-06-17 PROCEDURE — 25000003 PHARM REV CODE 250

## 2025-06-17 PROCEDURE — 11000001 HC ACUTE MED/SURG PRIVATE ROOM

## 2025-06-17 RX ORDER — OXYCODONE HYDROCHLORIDE 5 MG/1
5 TABLET ORAL EVERY 4 HOURS PRN
Refills: 0 | Status: DISCONTINUED | OUTPATIENT
Start: 2025-06-17 | End: 2025-06-18 | Stop reason: HOSPADM

## 2025-06-17 RX ORDER — OXYCODONE HYDROCHLORIDE 10 MG/1
10 TABLET ORAL EVERY 4 HOURS PRN
Refills: 0 | Status: DISCONTINUED | OUTPATIENT
Start: 2025-06-17 | End: 2025-06-18 | Stop reason: HOSPADM

## 2025-06-17 RX ADMIN — ACETAMINOPHEN 650 MG: 325 TABLET, FILM COATED ORAL at 05:06

## 2025-06-17 RX ADMIN — DOCUSATE SODIUM 200 MG: 100 CAPSULE, LIQUID FILLED ORAL at 08:06

## 2025-06-17 RX ADMIN — OXYCODONE 5 MG: 5 TABLET ORAL at 12:06

## 2025-06-17 RX ADMIN — OXYCODONE HYDROCHLORIDE 10 MG: 10 TABLET ORAL at 06:06

## 2025-06-17 RX ADMIN — IBUPROFEN 800 MG: 400 TABLET ORAL at 12:06

## 2025-06-17 RX ADMIN — DOCUSATE SODIUM 200 MG: 100 CAPSULE, LIQUID FILLED ORAL at 07:06

## 2025-06-17 RX ADMIN — OXYCODONE HYDROCHLORIDE 10 MG: 10 TABLET ORAL at 10:06

## 2025-06-17 RX ADMIN — PRENATAL VIT W/ FE FUMARATE-FA TAB 27-0.8 MG 1 TABLET: 27-0.8 TAB at 08:06

## 2025-06-17 RX ADMIN — ACETAMINOPHEN 650 MG: 325 TABLET, FILM COATED ORAL at 12:06

## 2025-06-17 RX ADMIN — FERROUS SULFATE TAB 325 MG (65 MG ELEMENTAL FE) 1 EACH: 325 (65 FE) TAB at 08:06

## 2025-06-17 RX ADMIN — ACETAMINOPHEN 650 MG: 325 TABLET, FILM COATED ORAL at 11:06

## 2025-06-17 RX ADMIN — IBUPROFEN 800 MG: 400 TABLET ORAL at 04:06

## 2025-06-17 RX ADMIN — IBUPROFEN 800 MG: 400 TABLET ORAL at 07:06

## 2025-06-17 RX ADMIN — OXYCODONE HYDROCHLORIDE 10 MG: 10 TABLET ORAL at 07:06

## 2025-06-17 RX ADMIN — ACETAMINOPHEN 650 MG: 325 TABLET, FILM COATED ORAL at 06:06

## 2025-06-17 NOTE — PROGRESS NOTES
"POSTPARTUM PROGRESS NOTE    Subjective:     PPD/POD#: 2   Procedure: Repeat LTCS (Fetal intolerance, failed TOLAC)    EGA: 40w3d   N/V: No   F/C: No   Abd Pain: Moderate, well-controlled with oral pain medication   Lochia: Mild   Voiding: Yes   Ambulating: Yes   Bowel fnc: Yes, passing flatus    Contraception: Per primary OB   NAEO. Doing well over night, reporting increased pain however reports that pain is well controlled on PRN medications.   Objective:      Temp:  [96 °F (35.6 °C)-98.4 °F (36.9 °C)] 96 °F (35.6 °C)  Pulse:  [78-83] 83  Resp:  [16-18] 18  SpO2:  [94 %-98 %] 94 %  BP: (107-121)/(55-75) 114/55    Abdomen: Soft, appropriately tender   Uterus: Firm, no fundal tenderness   Incision: Bandage in place without shadowing     Lab Review    No results for input(s): "NA", "K", "CL", "CO2", "BUN", "CREATININE", "GLU", "PROT", "BILITOT", "ALKPHOS", "ALT", "AST", "MG", "PHOS" in the last 168 hours.    Recent Labs   Lab 06/13/25  1629 06/16/25  0551   WBC 12.67 17.76*   HGB 12.7 9.8*   HCT 36.7* 29.1*   MCV 89 91    196         I/O  No intake or output data in the 24 hours ending 06/17/25 0626       Assessment and Plan:   Postpartum care:  - Patient doing well, meeting post-operative milestones   - Continue routine management and advances.    AMA  - PP lovenox not indicated   - PP ambulation encouraged     Elevated BP x1   - BP as above  - asymptomatic  - Mag: not indicated  - Hypertensive agent N/A     ABLA  - H/H as above  - QBL: 865  - asymptomatic  - iron/colace    Contraception  -Per primary OB    Breana Kessler  [facility]   OBGYN PGY1    "

## 2025-06-17 NOTE — DISCHARGE INSTRUCTIONS
Community Resources for Breastfeeding Mothers:   Hospital Breastfeeding Centers/ Lactation Consultants:   [facility]........................................................................................[phone]  [facility]....................................................................................[phone]   [facility]..........................[phone]   [facility].................................................................................[phone]   [facility].......................................................[phone]   [facility].................................[phone]   [facility].......................................[phone]   [facility]..................................................[phone]   [facility].....................................................................[phone]      [facility] (Poison Control)...........[phone]  PoisonHelp.org   Free medical advice 24/7 through the Poison Help Line and the online tool      Online Resources:   International Breastfeeding [place] ...............................................................................ibconline.ca   Dr Raymond Mcfadden online resource provides videos, articles, and information sheets.     Coeffective...............................................................................................................coeffective.com   Download the free mobile michaela to help get off to a great start with breastfeeding.   Droplet.....................................................................................................................TradersHighway.ZAPR   Global Health  Media...........................................................................................NovaTorque.org   Videos that teach and empower mothers and caregivers   Infant [facility].............................................................................[phone]      ImageWare Systems   Provides up to date information for medication use by moms during pregnancy and while breastfeeding.   Lovely Orr....................................................................................................................kellymom.com   Provides online information on breastfeeding and parenting      La Leche League........................................................................................ lllalmsla.org   /   llli.org   Mother-to-mother support groups with education, information support, and encouragement    Work and Pump........................................................................................... Youth1 Media.com   Information about breastfeeding for working moms     [place] Resources:   [place] Breastfeeding [facility]............................... [phone]    [facility]   Find local breastfeeding support   [place] Breastfeeding Support............................................................ LaBreastfeedingSport.org   Zip code search of breastfeeding resources in your area   [facility] for Healthy Babies............................................................[phone]   1219567njrw.org   Connects [place] moms and their families to health and pregnancy resources.  24/7   WIC (Women, Infant, Children)......................................................... [phone]   ldh.la.gov/WIC   Download the exactEarth Ltd michaela, get code from WIC office    [facility] Resources:     Baby Cafe............................................................................................................. babycafeusa.org   Free, drop in, informal  breastfeeding support groups offering professional lactation care and intervention.    [facility] [place] [facility]....................................... birthFairfieldNeul.Amen.   Infant feeding drop in clinics, Lactation services, support groups, education programs   Cafe au Lait...............................................[phone]   Cloudscaling.com/groups/[facility]   Free breastfeeding support group for families of color   Mothers Milk Bank [facility]....................................................[phone]                                                             [facility]/services/[facility]-milk-bank-[facility]   [facility] [facility].................... [email or web address]...................[phone]    PHIL Nesting..................................................................................[phone] nolanesting.com   In person and virtual support for families through pregnancy, birth, and early parenthood.       [facility] [place]- Dr. Candice Pruett.......................[phone] [address] [place] [address]                                  [email or web address].Amen.   [email or web address]   [facility] L[facility]ation [facility], [facility] (Geena Charles RN, IBCLC) ............................[phone]    [email or web address]    Healthy Start [place].....................................[phone] ([place])  [phone] ([place])   [facility] [place].gov/health-department/healthy-start   Serves women of childbearing age and addresses issues for pregnant women and their children from birth  to age two.          La Leche LeagueShrin[facility]............................. Kepware Technologies.Amen./ Cloudscaling.Amen./ freddie   In person and virtual mother to mother support groups with education, information support and   encouragement to women who  want to breastfeed      [place] Resources:   Breastfeeding Resources- [facility].....msd.ms.gov (under womens services)   Find resources and info about planning for breastfeeding, its benefits, and help with breastfeeding  s uccessfully.    [facility]....................................... Gotta'go Personal Care Device   [phone]   [address] [facility] [place], [place]. Call or text 24/7   [facility].......................................................Urban Renewable H2coastbreastfeedingcenter.Bloom Studio   [facility] Lactation Consultants sere [facility], including [facility],   [facility], and surrounding areas.    [place] Breastfeeding Coalition...............................................................................msbfc.org   Promotes and supports breastfeeding with families, health providers, and communities.   [facility] [place] breastfeeding Coalition.....................................................................smbfc.org   Find breastfeeding resources and support groups in your area.    WIC Nutrition Program- [facility].................................... [place].gov

## 2025-06-18 ENCOUNTER — PATIENT MESSAGE (OUTPATIENT)
Dept: OBSTETRICS AND GYNECOLOGY | Facility: CLINIC | Age: 40
End: 2025-06-18
Payer: COMMERCIAL

## 2025-06-18 VITALS
SYSTOLIC BLOOD PRESSURE: 136 MMHG | HEIGHT: 66 IN | BODY MASS INDEX: 29.73 KG/M2 | WEIGHT: 185 LBS | HEART RATE: 82 BPM | TEMPERATURE: 98 F | DIASTOLIC BLOOD PRESSURE: 85 MMHG | RESPIRATION RATE: 18 BRPM | OXYGEN SATURATION: 96 %

## 2025-06-18 PROCEDURE — 25000003 PHARM REV CODE 250

## 2025-06-18 RX ORDER — SIMETHICONE 80 MG
1 TABLET,CHEWABLE ORAL 3 TIMES DAILY
Status: DISCONTINUED | OUTPATIENT
Start: 2025-06-18 | End: 2025-06-18 | Stop reason: HOSPADM

## 2025-06-18 RX ORDER — ACETAMINOPHEN 325 MG/1
650 TABLET ORAL EVERY 6 HOURS
Qty: 60 TABLET | Refills: 0 | Status: SHIPPED | OUTPATIENT
Start: 2025-06-18

## 2025-06-18 RX ORDER — POLYETHYLENE GLYCOL 3350 17 G/17G
17 POWDER, FOR SOLUTION ORAL DAILY
Status: DISCONTINUED | OUTPATIENT
Start: 2025-06-18 | End: 2025-06-18 | Stop reason: HOSPADM

## 2025-06-18 RX ORDER — FERROUS SULFATE 325(65) MG
325 TABLET, DELAYED RELEASE (ENTERIC COATED) ORAL DAILY
Qty: 60 TABLET | Refills: 0 | Status: SHIPPED | OUTPATIENT
Start: 2025-06-18

## 2025-06-18 RX ORDER — DOCUSATE SODIUM 100 MG/1
200 CAPSULE, LIQUID FILLED ORAL 2 TIMES DAILY
Qty: 60 CAPSULE | Refills: 0 | Status: SHIPPED | OUTPATIENT
Start: 2025-06-18

## 2025-06-18 RX ORDER — OXYCODONE HYDROCHLORIDE 5 MG/1
5 TABLET ORAL EVERY 4 HOURS PRN
Qty: 20 TABLET | Refills: 0 | Status: SHIPPED | OUTPATIENT
Start: 2025-06-18

## 2025-06-18 RX ORDER — IBUPROFEN 600 MG/1
600 TABLET, FILM COATED ORAL EVERY 6 HOURS PRN
Qty: 60 TABLET | Refills: 0 | Status: SHIPPED | OUTPATIENT
Start: 2025-06-18

## 2025-06-18 RX ADMIN — ACETAMINOPHEN 650 MG: 325 TABLET, FILM COATED ORAL at 06:06

## 2025-06-18 RX ADMIN — ACETAMINOPHEN 650 MG: 325 TABLET, FILM COATED ORAL at 12:06

## 2025-06-18 RX ADMIN — SENNOSIDES AND DOCUSATE SODIUM 1 TABLET: 50; 8.6 TABLET ORAL at 12:06

## 2025-06-18 RX ADMIN — IBUPROFEN 800 MG: 400 TABLET ORAL at 03:06

## 2025-06-18 RX ADMIN — OXYCODONE HYDROCHLORIDE 10 MG: 10 TABLET ORAL at 12:06

## 2025-06-18 RX ADMIN — PRENATAL VIT W/ FE FUMARATE-FA TAB 27-0.8 MG 1 TABLET: 27-0.8 TAB at 09:06

## 2025-06-18 RX ADMIN — OXYCODONE HYDROCHLORIDE 10 MG: 10 TABLET ORAL at 04:06

## 2025-06-18 RX ADMIN — SIMETHICONE 80 MG: 80 TABLET, CHEWABLE ORAL at 09:06

## 2025-06-18 RX ADMIN — OXYCODONE 5 MG: 5 TABLET ORAL at 11:06

## 2025-06-18 RX ADMIN — DOCUSATE SODIUM 200 MG: 100 CAPSULE, LIQUID FILLED ORAL at 09:06

## 2025-06-18 RX ADMIN — IBUPROFEN 800 MG: 400 TABLET ORAL at 11:06

## 2025-06-18 RX ADMIN — FERROUS SULFATE TAB 325 MG (65 MG ELEMENTAL FE) 1 EACH: 325 (65 FE) TAB at 09:06

## 2025-06-18 RX ADMIN — ACETAMINOPHEN 650 MG: 325 TABLET, FILM COATED ORAL at 01:06

## 2025-06-18 RX ADMIN — POLYETHYLENE GLYCOL 3350 17 G: 17 POWDER, FOR SOLUTION ORAL at 09:06

## 2025-06-18 NOTE — PLAN OF CARE
Problem: Adult Inpatient Plan of Care  Goal: Plan of Care Review  Outcome: Met  Goal: Patient-Specific Goal (Individualized)  Outcome: Met  Goal: Absence of Hospital-Acquired Illness or Injury  Outcome: Met  Goal: Optimal Comfort and Wellbeing  Outcome: Met  Goal: Readiness for Transition of Care  Outcome: Met     Problem:  Fall Injury Risk  Goal: Absence of Fall, Infant Drop and Related Injury  Outcome: Met     Problem: Labor  Goal: Hemostasis  Outcome: Met  Goal: Stable Fetal Wellbeing  Outcome: Met  Goal: Effective Progression to Delivery  Outcome: Met  Goal: Absence of Infection Signs and Symptoms  Outcome: Met  Goal: Acceptable Pain Control  Outcome: Met  Goal: Normal Uterine Contraction Pattern  Outcome: Met     Problem: Infection  Goal: Absence of Infection Signs and Symptoms  Outcome: Met     Problem: Hospitalized  Patient  Goal: Optimal Patient-Fetal Wellbeing  Outcome: Met     Problem: Anesthesia/Analgesia, Neuraxial  Goal: Safe, Effective Infusion Delivery  Outcome: Met  Goal: Stable Patient-Fetal Status  Outcome: Met  Goal: Absence of Infection Signs and Symptoms  Outcome: Met  Goal: Nausea and Vomiting Relief  Outcome: Met  Goal: Effective Pain Control  Outcome: Met  Goal: Effective Oxygenation and Ventilation  Outcome: Met  Goal: Baseline Motor Function Return  Outcome: Met  Goal: Effective Urinary Elimination  Outcome: Met     Problem: Skin Injury Risk Increased  Goal: Skin Health and Integrity  Outcome: Met     Problem: Wound  Goal: Optimal Coping  Outcome: Met  Goal: Optimal Functional Ability  Outcome: Met  Goal: Absence of Infection Signs and Symptoms  Outcome: Met  Goal: Improved Oral Intake  Outcome: Met  Goal: Optimal Pain Control and Function  Outcome: Met  Goal: Skin Health and Integrity  Outcome: Met  Goal: Optimal Wound Healing  Outcome: Met     Problem: Breastfeeding  Goal: Effective Breastfeeding  Outcome: Met

## 2025-06-18 NOTE — LACTATION NOTE
06/18/25 0845   Maternal Assessment   Breast Shape Bilateral:;round   Breast Density Bilateral:;soft   Areola Bilateral:;elastic   Nipples Bilateral:;everted   Left Nipple Symptoms tender   Right Nipple Symptoms tender   Maternal Infant Feeding   Maternal Emotional State relaxed;independent   Infant Positioning cross-cradle   Signs of Milk Transfer infant jaw motion present;audible swallow   Comfort Measures Before/During Feeding infant position adjusted  (flanged baby's lower lip on L breast and R breast)   Comfort Measures Following Feeding   (Patient using silverette type nipple covers; instructed not to use the covers and lanolin simultaneously)   Nipple Shape After Feeding, Left round   Nipple Shape After Feeding, Right round   Latch Assistance yes   Community Referrals   Community Referrals outpatient lactation program;pediatric care provider;support group     Visited patient in room to provide discharge education, patient holding baby in arms, just removed from 1 breast.  Encouraged father to attempt to wake baby by burping and diaper check, baby easy to arouse.  Pillows provided to patient for support.  Patient able to position and attach baby to L and R breasts, cross cradle position, deep latches achieved, baby actively sucking c frequent wide pauses noted, consultant flanged the baby's lower lip each time.  Discharge education provided, Guide reviewed along c the community resources and First Alert form. Patient has several pumps for use at home, discouraged from use of an already used friend's pump.  Encouraged to call for additional assistance prn.

## 2025-06-18 NOTE — PROGRESS NOTES
"POSTPARTUM PROGRESS NOTE    Subjective:     PPD/POD#: 3   Procedure: Repeat LTCS (Fetal intolerance, failed TOLAC)    EGA: 40w3d   N/V: No   F/C: No   Abd Pain: Moderate, well-controlled with oral pain medication   Lochia: Mild   Voiding: Yes   Ambulating: Yes   Bowel fnc: Yes, passing flatus    Contraception: Per primary OB   NAEON. Reports gas pain and constipation. Otherwise no complaints.  Objective:      Temp:  [97.9 °F (36.6 °C)-98 °F (36.7 °C)] 97.9 °F (36.6 °C)  Pulse:  [74-79] 77  Resp:  [16-18] 18  SpO2:  [96 %-97 %] 97 %  BP: (122-125)/(76-79) 125/77    Abdomen: Soft, appropriately tender   Uterus: Firm, no fundal tenderness   Incision: Bandage in place without shadowing     Lab Review    No results for input(s): "NA", "K", "CL", "CO2", "BUN", "CREATININE", "GLU", "PROT", "BILITOT", "ALKPHOS", "ALT", "AST", "MG", "PHOS" in the last 168 hours.    Recent Labs   Lab 06/13/25  1629 06/16/25  0551   WBC 12.67 17.76*   HGB 12.7 9.8*   HCT 36.7* 29.1*   MCV 89 91    196         I/O  No intake or output data in the 24 hours ending 06/18/25 0627       Assessment and Plan:   Postpartum care:  - Patient doing well, meeting post-operative milestones   - Continue routine management and advances.  - Add simethicone and miralax for gas pain/constipation    AMA  - PP lovenox not indicated   - PP ambulation encouraged     Elevated BP x1   - BP as above  - asymptomatic  - Mag: not indicated  - Hypertensive agen: not indicated    ABLA  - H/H as above  - QBL: 865  - asymptomatic  - iron/colace      Lana Gabriel MD  OBGYN, PGY-4    "

## 2025-06-18 NOTE — DISCHARGE SUMMARY
Delivery Discharge Summary  Obstetrics      Primary OB Clinician: Josie Khan DO      Admission date: 2025  Discharge date: 2025    Disposition: To home, self care    Discharge Diagnosis List:    Problem List[1]    Procedure: , due to [facility]    Hospital Course:  Ray Ugarte is a 39 y.o. now , POD #2 who was admitted on 2025 at 40w3d for IOL. Patient was subsequently admitted to labor and delivery unit with signed consents.     Labor course was complicated by fetal intolerance of labor, and decision was made to proceed with delivery via  which was performed without complications.    Please see delivery note for further details. Her postpartum course was uncomplicated. On discharge day, patient's pain is controlled with oral pain medications. Pt is tolerating ambulation without SOB or CP, and regular diet without N/V. Reports lochia is mild. Denies any HA, vision changes, F/C, LE swelling. Denies any breast pain/soreness.    Pt in stable condition and ready for discharge. She has been instructed to start and/or continue medications and follow up with her obstetrics provider as listed below.    Pertinent studies:  CBC  Recent Labs   Lab 25  1629 25  0551   WBC 12.67 17.76*   HGB 12.7 9.8*   HCT 36.7* 29.1*   MCV 89 91    196          Immunization History   Administered Date(s) Administered    COVID-19, MRNA, LN-S, PF (Pfizer) (Purple Cap) 10/13/2021    Influenza 2014    Influenza - Quadrivalent - MDCK - PF 2022    Influenza - Quadrivalent - PF *Preferred* (6 months and older) 2014, 2015, 2018, 2019, 2020, 10/13/2021, 10/05/2023    Influenza - Trivalent - Fluarix, Flulaval, Fluzone, Afluria - PF 2015    Tdap 2015, 2020, 2025     5:14 PM   [place]  Depression Scale   I have been able to laugh and see the funny side of things. 0   I have looked forward  "with enjoyment to things. 0   I have blamed myself unnecessarily when things went wrong. 1   I have been anxious or worried for no good reason. 2   I have felt scared or panicky for no good reason. 0   Things have been getting on top of me. 2   I have been so unhappy that I have had difficulty sleeping. 0   I have felt sad or miserable. 0   I have been so unhappy that I have been crying. 0   The thought of harming myself has occurred to me. 0        Delivery:    Episiotomy:     Lacerations:     Repair suture:     Repair # of packets:     Blood loss (ml):       Birth information:  YOB: 2025   Time of birth: 2:47 AM   Sex: male   Delivery type: , Low Transverse   Gestational Age: 40w3d     Measurements    Weight: 3350 g  Weight (lbs): 7 lb 6.2 oz  Length: 51.9 cm  Length (in): 20.43"         Delivery Clinician: Delivery Providers    Delivering clinician: Olivier Cruz MD   Provider Role    Shabana Marquez MD Myles, Christina, RN Hueffer, Ashlyn, RN Hilkirk, Jennifer, RN Gonzalez, Nallyvis, ST              Additional  information:  Forceps:    Vacuum:    Breech:    Observed anomalies      Living?:     Apgars    Living status: Living  Apgar Component Scores:  1 min.:  5 min.:  10 min.:  15 min.:  20 min.:    Skin color:  0  1       Heart rate:  2  2       Reflex irritability:  2  2       Muscle tone:  1  2       Respiratory effort:  2  2       Total:  7  9       Apgars assigned by: SHANNON VICKERS RN         Placenta: Delivered:       appearance    Patient Instructions:   Current Discharge Medication List        START taking these medications    Details   acetaminophen (TYLENOL) 325 MG tablet Take 2 tablets (650 mg total) by mouth every 6 (six) hours.  Qty: 60 tablet, Refills: 0      docusate sodium (COLACE) 100 MG capsule Take 2 capsules (200 mg total) by mouth 2 (two) times daily.  Qty: 60 capsule, Refills: 0      ferrous sulfate 325 (65 FE) MG EC tablet Take 1 tablet (325 mg " total) by mouth once daily.  Qty: 60 tablet, Refills: 0      ibuprofen (ADVIL,MOTRIN) 600 MG tablet Take 1 tablet (600 mg total) by mouth every 6 (six) hours as needed.  Qty: 60 tablet, Refills: 0      oxyCODONE (ROXICODONE) 5 MG immediate release tablet Take 1 tablet (5 mg total) by mouth every 4 (four) hours as needed.  Qty: 20 tablet, Refills: 0    Comments: Quantity prescribed more than 7 day supply? No  Associated Diagnoses: Status post repeat low transverse  section           CONTINUE these medications which have NOT CHANGED    Details   diphenhydrAMINE (BENADRYL) 25 mg capsule Take 25 mg by mouth every 6 (six) hours as needed for Itching.      famotidine (PEPCID) 40 MG tablet TAKE 1 TABLET BY MOUTH EVERY DAY IN THE EVENING  Qty: 30 tablet, Refills: 1    Associated Diagnoses: Heartburn during pregnancy in second trimester      omega-3 fatty acids/fish oil (FISH OIL-OMEGA-3 FATTY ACIDS) 300-1,000 mg capsule Take by mouth once daily.      prenatal vit no.124/iron/folic (PRENATAL VITAMIN ORAL) Take 1 Dose by mouth Daily.           STOP taking these medications       albuterol sulfate (PROAIR RESPICLICK) 90 mcg/actuation inhaler Comments:   Reason for Stopping:         aspirin (ECOTRIN) 81 MG EC tablet Comments:   Reason for Stopping:         clobetasoL (TEMOVATE) 0.05 % cream Comments:   Reason for Stopping:         fluticasone propionate (FLONASE) 50 mcg/actuation nasal spray Comments:   Reason for Stopping:               Discharge Procedure Orders   BREAST PUMP FOR HOME USE     Order Specific Question Answer Comments   Type of pump: Electric    Weight: 83.9 kg (185 lb)    Length of need (1-99 months): 99      Diet Adult Regular     Pelvic Rest   Order Comments: Pelvic rest until follow up visit. Nothing in vagina -no sex, tampons, douching, etc.     Notify your health care provider if you experience any of the following:  temperature >100.4     Notify your health care provider if you experience any of  the following:  persistent nausea and vomiting or diarrhea     Notify your health care provider if you experience any of the following:  severe uncontrolled pain     Notify your health care provider if you experience any of the following:  difficulty breathing or increased cough     Notify your health care provider if you experience any of the following:  severe persistent headache     Notify your health care provider if you experience any of the following:  persistent dizziness, light-headedness, or visual disturbances     Notify your health care provider if you experience any of the following:  increased confusion or weakness     Notify your health care provider if you experience any of the following:   Order Comments: - Heavy vaginal bleeding soaking through more than 2 pads/hour for > 2 hours  - Severe abdominal pain  - Headache not relieved with Tylenol  - Vision changes  - Chest pain or shortness or breath        Follow-up Information       Josie Khan, DO Follow up in 6 week(s).    Specialty: Obstetrics and Gynecology  Why: Postpartum Visit  Contact information:  [address] [address]  [phone]                              Lana Gabriel MD  OBGYN, PGY-4         [1]   Patient Active Problem List  Diagnosis    Instability of pelvis or thigh joint    Weakness of both hips    Acetabular labrum tear    History of     History of  section    S/P  section    Status post repeat low transverse  section    ABLA (acute blood loss anemia)    Elevated blood pressure affecting pregnancy, antepartum

## 2025-06-19 ENCOUNTER — PATIENT MESSAGE (OUTPATIENT)
Dept: OBSTETRICS AND GYNECOLOGY | Facility: OTHER | Age: 40
End: 2025-06-19
Payer: COMMERCIAL

## 2025-06-20 ENCOUNTER — PATIENT MESSAGE (OUTPATIENT)
Dept: OBSTETRICS AND GYNECOLOGY | Facility: CLINIC | Age: 40
End: 2025-06-20
Payer: COMMERCIAL

## 2025-06-27 ENCOUNTER — PATIENT MESSAGE (OUTPATIENT)
Dept: OBSTETRICS AND GYNECOLOGY | Facility: CLINIC | Age: 40
End: 2025-06-27
Payer: COMMERCIAL

## 2025-07-07 ENCOUNTER — PATIENT MESSAGE (OUTPATIENT)
Facility: CLINIC | Age: 40
End: 2025-07-07
Payer: COMMERCIAL

## 2025-07-08 ENCOUNTER — CLINICAL SUPPORT (OUTPATIENT)
Facility: CLINIC | Age: 40
End: 2025-07-08
Payer: COMMERCIAL

## 2025-07-08 ENCOUNTER — PATIENT MESSAGE (OUTPATIENT)
Facility: CLINIC | Age: 40
End: 2025-07-08

## 2025-07-08 DIAGNOSIS — R12 HEARTBURN DURING PREGNANCY IN SECOND TRIMESTER: ICD-10-CM

## 2025-07-08 DIAGNOSIS — O26.892 HEARTBURN DURING PREGNANCY IN SECOND TRIMESTER: ICD-10-CM

## 2025-07-08 RX ORDER — FAMOTIDINE 40 MG/1
40 TABLET, FILM COATED ORAL NIGHTLY
Qty: 90 TABLET | Refills: 0 | Status: SHIPPED | OUTPATIENT
Start: 2025-07-08

## 2025-07-08 NOTE — PROGRESS NOTES
Post Visit Nursing Note  Maternal Note  In-Home Visit    [facility] Consent: Yes     Home visit completed 2025. This is the initial visit to the home by a Family [facility] nurse.     Return Visit Needed: Yes   (If yes) Return visit date: TBD    Maternal History:    Ray Ugarte is a 39 y.o. female White, who delivered at 40w3d GA via LTCS.    Ray Ugarte experienced the following pregnancy and delivery complications during this most recent pregnancy:  prior C/S x1 and successful , IVF pregnancy, AMA, C/S due to fetal intolerance.     Vaccine History:   Immunization History   Administered Date(s) Administered    COVID-19, MRNA, LN-S, PF (Pfizer) (Purple Cap) 10/13/2021    Influenza 2014    Influenza - Quadrivalent - MDCK - PF 2022    Influenza - Quadrivalent - PF *Preferred* (6 months and older) 2014, 2015, 2018, 2019, 2020, 10/13/2021, 10/05/2023    Influenza - Trivalent - Fluarix, Flulaval, Fluzone, Afluria - PF 2015    Tdap 2015, 2020, 2025       Maternal Assessment:    Vital Signs During Home Visit:   LMP 2024 (Exact Date)   Breastfeeding Yes   [place]  Depression Scale (EPDS) During Home Visit: 8  Feeding: breast  Relationship Risk: 0  Substance Use Risk: 0  Contraception: Will discuss with provider  Plan for Additional Children: No    The patient is a 39-year-old female, postpartum following a low transverse  section (LTCS) on 6/15/2025 at 40 weeks 3 days gestation. She reports breastfeeding and pumping once daily for milk storage. She states experiencing breast engorgement, especially during the late night/early morning hours when her last feed exceeds 3 hours. She reports her incision as tender to touch but denies redness, swelling, or signs of infection. She has discontinued pain medications but was advised to continue Tylenol and ibuprofen as needed for comfort.     Postpartum Assessment:     Abdominal: Abdomen soft, incision sites clean, dry, and intact; tender to touch but no signs of hematoma or wound infection  Breasts: Engorged, tender, with noted discomfort during engorgement periods  Lochia: Not present at this time  Emotional status: Tired but alert, feeling overwhelmed by sleep deprivation, overall mood appears appropriate  Monitoring: No signs of postpartum hemorrhage, infection, or other complications observed  Encouraged to rest when possible, continue current pain management, and monitor for any signs of increased pain, redness, or swelling at incision site. Appears to be overall recovering well during this postpartum period.    Home Environment:    Home is safe, equipped with smoke and CO2 detectors to ensure their well-being.      Referrals:    None    Education Materials Provided:      POST-BIRTH Warning Signs from [facility]  POST-BIRTH Warning Signs Education Program   [facility]   Healthy Sleep: For You and Baby   Infant Warning Signs   [facility]    Recommended Immunization Schedule from [facility]   Building Strength with Tummy Time   Infant Crying & Shaken Baby Syndrome   How to Keep Your Child Safe in the Car   Lead Poisoning Risk Checklist from [facility]  Lead Poisoning Prevention   What Does a Safe Sleep Environment Look Like? from NIH  Safe to Sleep®   Home Safety Checklist from [employer]  Home Safety Checklist   [facility]    Mental Health Disorders from [employer]   National Maternal Mental Health Hotline   Online Support Meetings from [employer]  Weekly Online Support Group  Snuggles and Struggles from [facility]    [facility]   How to care for baby's skin  Hear the Beep where you Sleep

## 2025-07-08 NOTE — TELEPHONE ENCOUNTER
Refill Routing Note   Medication(s) are not appropriate for processing by [facility] for the following reason(s):        Patient not seen by provider within 15 months    [facility] action(s):  Defer               Appointments  past 12m or future 3m with PCP    Date Provider   Last Visit   6/11/2025 Josie Khan, DO   Next Visit   8/14/2025 Josie Khan, DO   ED visits in past 90 days: 0        Note composed:5:58 AM 07/08/2025

## 2025-07-18 ENCOUNTER — PATIENT MESSAGE (OUTPATIENT)
Facility: CLINIC | Age: 40
End: 2025-07-18
Payer: COMMERCIAL

## 2025-07-31 VITALS — BODY MASS INDEX: 29.7 KG/M2 | SYSTOLIC BLOOD PRESSURE: 119 MMHG | WEIGHT: 184 LBS | DIASTOLIC BLOOD PRESSURE: 74 MMHG

## 2025-07-31 NOTE — PROGRESS NOTES
Induction timing discussed and ordered.  Patient seen and examined.  No complaints, denies VB/LOF/Ctxns, reports good fetal movement. Precautions for Bleeding/ ROM/ Decreased fetal movement/ Pre-E reviewed.  F/U scheduled 1 weeks

## 2025-08-12 ENCOUNTER — CLINICAL SUPPORT (OUTPATIENT)
Facility: CLINIC | Age: 40
End: 2025-08-12
Payer: COMMERCIAL

## 2025-08-14 ENCOUNTER — POSTPARTUM VISIT (OUTPATIENT)
Dept: OBSTETRICS AND GYNECOLOGY | Facility: CLINIC | Age: 40
End: 2025-08-14
Attending: OBSTETRICS & GYNECOLOGY
Payer: COMMERCIAL

## 2025-08-14 VITALS — SYSTOLIC BLOOD PRESSURE: 106 MMHG | DIASTOLIC BLOOD PRESSURE: 70 MMHG | BODY MASS INDEX: 25.8 KG/M2 | WEIGHT: 159.81 LBS

## 2025-08-14 DIAGNOSIS — N39.3 SUI (STRESS URINARY INCONTINENCE, FEMALE): ICD-10-CM

## 2025-08-14 DIAGNOSIS — Z30.09 GENERAL COUNSELING AND ADVICE FOR CONTRACEPTIVE MANAGEMENT: ICD-10-CM

## 2025-08-14 DIAGNOSIS — Z98.891 STATUS POST REPEAT LOW TRANSVERSE CESAREAN SECTION: ICD-10-CM

## 2025-08-14 PROCEDURE — 99999 PR PBB SHADOW E&M-EST. PATIENT-LVL III: CPT | Mod: PBBFAC,,, | Performed by: OBSTETRICS & GYNECOLOGY

## 2025-08-14 PROCEDURE — 0503F POSTPARTUM CARE VISIT: CPT | Mod: CPTII,S$GLB,, | Performed by: OBSTETRICS & GYNECOLOGY

## 2025-08-14 RX ORDER — NORETHINDRONE 0.35 MG/1
1 TABLET ORAL DAILY
Qty: 90 TABLET | Refills: 3 | Status: SHIPPED | OUTPATIENT
Start: 2025-08-14 | End: 2026-08-14

## (undated) DEVICE — TUBING SUC UNIV W/CONN 12FT

## (undated) DEVICE — CORD BIPOLAR 12 FOOT

## (undated) DEVICE — KNIFE ANGLED OPTH

## (undated) DEVICE — ELECTRODE REM PLYHSV RETURN 9

## (undated) DEVICE — TRAY MUSCLE LID EYE

## (undated) DEVICE — SUT VICRYL 2-0 CT-1 VCP345H

## (undated) DEVICE — PENCIL ROCKER SWITCH 10FT CORD

## (undated) DEVICE — SKINMARKER & RULER REGULAR X-F

## (undated) DEVICE — CUP MEDICINE GRADUATED 1OZ

## (undated) DEVICE — BOWL STERILE LARGE 32OZ

## (undated) DEVICE — NDL ECLIPSE SAFETY 18GX1-1/2IN

## (undated) DEVICE — INSTRUMENT SURG SUCT FRZR W/C

## (undated) DEVICE — Device

## (undated) DEVICE — CLEANER TIP ELECSURG 2X2IN

## (undated) DEVICE — TRAY MUSCLE LID EYE OMC

## (undated) DEVICE — DRAPE EENT SPLIT STERILE

## (undated) DEVICE — SOL IRRI STRL WATER 1000ML

## (undated) DEVICE — FORCEP CURVED DISP

## (undated) DEVICE — KIT ANTIFOG

## (undated) DEVICE — SUT 5-0 VICRYL 12/BX

## (undated) DEVICE — SOL NACL IRR 1000ML BTL

## (undated) DEVICE — NDL N SERIES MICRO-DISSECTION

## (undated) DEVICE — PROTECTOR CORNEAL CROUCH ST

## (undated) DEVICE — TOWEL OR DISP STRL BLUE 4/PK

## (undated) DEVICE — BLADE SURG #15 CARBON STEEL

## (undated) DEVICE — NDL HYPO A BEVEL 30X1/2

## (undated) DEVICE — SYR BULB EAR/ULCER STER 3OZ

## (undated) DEVICE — SOL BETADINE 5%

## (undated) DEVICE — DRAPE STERI INSTRUMENT 1018

## (undated) DEVICE — BLADE SURGICAL GLASSVAN #12

## (undated) DEVICE — SYR 3CC LUER LOC

## (undated) DEVICE — BALL COTTON NS M 1IN

## (undated) DEVICE — NDL STRAIGHT 4CM LEIBINGER

## (undated) DEVICE — SUT 5/0 18IN PLAIN FAST AB

## (undated) DEVICE — DRAPE HALF SURGICAL 40X58IN

## (undated) DEVICE — GLOVE BIOGEL SKINSENSE PI 7.5

## (undated) DEVICE — SYR 10CC LUER LOCK

## (undated) DEVICE — SPONGE GELFOAM 12-7MM

## (undated) DEVICE — SOL BSS BALANCED SALT

## (undated) DEVICE — GOWN ECLIPSE REINF LVL4 TWL XL

## (undated) DEVICE — DROPPER MEDICINE

## (undated) DEVICE — SUT BONE WAX 2.5 GRMS 12/BX

## (undated) DEVICE — SUT CTD VICRYL 4-0 P-2

## (undated) DEVICE — CONTAINER SPECIMEN OR STER 4OZ

## (undated) DEVICE — CASSETTE SONOPET IQ IRRIGATION